# Patient Record
Sex: MALE | Race: WHITE | NOT HISPANIC OR LATINO | Employment: OTHER | URBAN - METROPOLITAN AREA
[De-identification: names, ages, dates, MRNs, and addresses within clinical notes are randomized per-mention and may not be internally consistent; named-entity substitution may affect disease eponyms.]

---

## 2017-09-11 ENCOUNTER — HOSPITAL ENCOUNTER (EMERGENCY)
Facility: HOSPITAL | Age: 62
Discharge: HOME | End: 2017-09-11
Attending: EMERGENCY MEDICINE | Admitting: EMERGENCY MEDICINE

## 2017-09-11 ENCOUNTER — APPOINTMENT (OUTPATIENT)
Dept: GENERAL RADIOLOGY | Facility: HOSPITAL | Age: 62
End: 2017-09-11

## 2017-09-11 VITALS
SYSTOLIC BLOOD PRESSURE: 128 MMHG | WEIGHT: 204 LBS | HEIGHT: 71 IN | HEART RATE: 54 BPM | OXYGEN SATURATION: 97 % | DIASTOLIC BLOOD PRESSURE: 74 MMHG | RESPIRATION RATE: 16 BRPM | BODY MASS INDEX: 28.56 KG/M2 | TEMPERATURE: 97.8 F

## 2017-09-11 DIAGNOSIS — R07.9 CHEST PAIN, UNSPECIFIED TYPE: Primary | ICD-10-CM

## 2017-09-11 DIAGNOSIS — I10 ESSENTIAL HYPERTENSION: ICD-10-CM

## 2017-09-11 DIAGNOSIS — Z87.898 HISTORY OF CHRONIC PAIN: ICD-10-CM

## 2017-09-11 LAB
ALBUMIN SERPL-MCNC: 4.2 G/DL (ref 3.2–4.8)
ALBUMIN/GLOB SERPL: 1.6 G/DL (ref 1.5–2.5)
ALP SERPL-CCNC: 70 U/L (ref 25–100)
ALT SERPL W P-5'-P-CCNC: 26 U/L (ref 7–40)
ANION GAP SERPL CALCULATED.3IONS-SCNC: 6 MMOL/L (ref 3–11)
AST SERPL-CCNC: 23 U/L (ref 0–33)
BASOPHILS # BLD AUTO: 0.04 10*3/MM3 (ref 0–0.2)
BASOPHILS NFR BLD AUTO: 0.4 % (ref 0–1)
BILIRUB SERPL-MCNC: 0.4 MG/DL (ref 0.3–1.2)
BNP SERPL-MCNC: 20 PG/ML (ref 0–100)
BUN BLD-MCNC: 13 MG/DL (ref 9–23)
BUN/CREAT SERPL: 14.4 (ref 7–25)
CALCIUM SPEC-SCNC: 9.2 MG/DL (ref 8.7–10.4)
CHLORIDE SERPL-SCNC: 109 MMOL/L (ref 99–109)
CO2 SERPL-SCNC: 28 MMOL/L (ref 20–31)
CREAT BLD-MCNC: 0.9 MG/DL (ref 0.6–1.3)
DEPRECATED RDW RBC AUTO: 42.8 FL (ref 37–54)
EOSINOPHIL # BLD AUTO: 0.22 10*3/MM3 (ref 0–0.3)
EOSINOPHIL NFR BLD AUTO: 2.5 % (ref 0–3)
ERYTHROCYTE [DISTWIDTH] IN BLOOD BY AUTOMATED COUNT: 12.7 % (ref 11.3–14.5)
GFR SERPL CREATININE-BSD FRML MDRD: 86 ML/MIN/1.73
GLOBULIN UR ELPH-MCNC: 2.6 GM/DL
GLUCOSE BLD-MCNC: 92 MG/DL (ref 70–100)
HCT VFR BLD AUTO: 45.7 % (ref 38.9–50.9)
HGB BLD-MCNC: 15.7 G/DL (ref 13.1–17.5)
HOLD SPECIMEN: NORMAL
HOLD SPECIMEN: NORMAL
IMM GRANULOCYTES # BLD: 0.05 10*3/MM3 (ref 0–0.03)
IMM GRANULOCYTES NFR BLD: 0.6 % (ref 0–0.6)
LIPASE SERPL-CCNC: 34 U/L (ref 6–51)
LYMPHOCYTES # BLD AUTO: 2.48 10*3/MM3 (ref 0.6–4.8)
LYMPHOCYTES NFR BLD AUTO: 27.9 % (ref 24–44)
MCH RBC QN AUTO: 31.9 PG (ref 27–31)
MCHC RBC AUTO-ENTMCNC: 34.4 G/DL (ref 32–36)
MCV RBC AUTO: 92.9 FL (ref 80–99)
MONOCYTES # BLD AUTO: 0.73 10*3/MM3 (ref 0–1)
MONOCYTES NFR BLD AUTO: 8.2 % (ref 0–12)
NEUTROPHILS # BLD AUTO: 5.37 10*3/MM3 (ref 1.5–8.3)
NEUTROPHILS NFR BLD AUTO: 60.4 % (ref 41–71)
PLATELET # BLD AUTO: 183 10*3/MM3 (ref 150–450)
PMV BLD AUTO: 10.4 FL (ref 6–12)
POTASSIUM BLD-SCNC: 3.8 MMOL/L (ref 3.5–5.5)
PROT SERPL-MCNC: 6.8 G/DL (ref 5.7–8.2)
RBC # BLD AUTO: 4.92 10*6/MM3 (ref 4.2–5.76)
SODIUM BLD-SCNC: 143 MMOL/L (ref 132–146)
TROPONIN I SERPL-MCNC: 0 NG/ML (ref 0–0.07)
TROPONIN I SERPL-MCNC: 0 NG/ML (ref 0–0.07)
WBC NRBC COR # BLD: 8.89 10*3/MM3 (ref 3.5–10.8)
WHOLE BLOOD HOLD SPECIMEN: NORMAL
WHOLE BLOOD HOLD SPECIMEN: NORMAL

## 2017-09-11 PROCEDURE — 80053 COMPREHEN METABOLIC PANEL: CPT | Performed by: EMERGENCY MEDICINE

## 2017-09-11 PROCEDURE — 93005 ELECTROCARDIOGRAM TRACING: CPT

## 2017-09-11 PROCEDURE — 71010 HC CHEST PA OR AP: CPT

## 2017-09-11 PROCEDURE — 83690 ASSAY OF LIPASE: CPT | Performed by: EMERGENCY MEDICINE

## 2017-09-11 PROCEDURE — 84484 ASSAY OF TROPONIN QUANT: CPT

## 2017-09-11 PROCEDURE — 85025 COMPLETE CBC W/AUTO DIFF WBC: CPT | Performed by: EMERGENCY MEDICINE

## 2017-09-11 PROCEDURE — 99284 EMERGENCY DEPT VISIT MOD MDM: CPT

## 2017-09-11 PROCEDURE — 83880 ASSAY OF NATRIURETIC PEPTIDE: CPT | Performed by: EMERGENCY MEDICINE

## 2017-09-11 PROCEDURE — 93005 ELECTROCARDIOGRAM TRACING: CPT | Performed by: PHYSICIAN ASSISTANT

## 2017-09-11 RX ORDER — LISINOPRIL AND HYDROCHLOROTHIAZIDE 20; 12.5 MG/1; MG/1
1 TABLET ORAL DAILY
COMMUNITY
End: 2017-09-14

## 2017-09-11 RX ORDER — SODIUM CHLORIDE 0.9 % (FLUSH) 0.9 %
10 SYRINGE (ML) INJECTION AS NEEDED
Status: DISCONTINUED | OUTPATIENT
Start: 2017-09-11 | End: 2017-09-12 | Stop reason: HOSPADM

## 2017-09-11 RX ORDER — ASPIRIN 81 MG/1
324 TABLET, CHEWABLE ORAL ONCE
Status: COMPLETED | OUTPATIENT
Start: 2017-09-11 | End: 2017-09-11

## 2017-09-11 RX ORDER — ASPIRIN 81 MG/1
81 TABLET ORAL DAILY
Qty: 30 TABLET | Refills: 0 | Status: SHIPPED | OUTPATIENT
Start: 2017-09-11

## 2017-09-11 RX ADMIN — ASPIRIN 81 MG 324 MG: 81 TABLET ORAL at 20:20

## 2017-09-12 NOTE — DISCHARGE INSTRUCTIONS
You will be contacted next business day by outpatient registration to schedule your visit with the chest pain clinic.  Follow up with Francine Turner for recheck in 1-2 days.  Stop smoking.  Begin taking aspirin 81 mg daily if you are not already taking aspirin.  No strenuous activity.  Return to emergency department immediately if any change or worsening of symptoms.

## 2017-09-12 NOTE — ED PROVIDER NOTES
"Subjective   HPI Comments: 62-year-old male presents to emergency department with a 3 to four-day history of intermittent substernal chest pain/left sided chest pain that radiates in the left shoulder and into the jaw.  Patient states pain is episodic lasting from a few minutes to an hour.  Patient first noticed the symptoms.  4 days ago while he was standing in a court room.  Symptoms are not associated with exertion, do not improve with rest.  Not associated with nausea diaphoresis palpitations or irregular heartbeat.  He has had a cough for the past 2 weeks with slight scant sputum production.  No hemoptysis or back pain.  No palpitations.  No abdominal pain or LE swelling.  He has a history of hypertension but no prior history of MI or angina.  He states he takes \"for 5\" medications but does not recall the names.  He has a history of chronic arthritis for which he takes hydrocodone.  No prior history of cardiovascular disease other than hypertension, no history of stress test or cardiac catheterization.    He smokes one to one and a half packs of cigarettes per day.    Primary care provider is Francine Spence    He denies drug allergies.    Patient is a 62 y.o. male presenting with chest pain.   Chest Pain   Associated symptoms: no abdominal pain, no cough, no diaphoresis, no fever, no nausea, no shortness of breath and no vomiting        Review of Systems   Constitutional: Negative for chills, diaphoresis and fever.   HENT: Negative for congestion and sore throat.    Respiratory: Negative for cough, choking, chest tightness, shortness of breath and wheezing.    Cardiovascular: Negative for chest pain and leg swelling.   Gastrointestinal: Negative for abdominal distention, abdominal pain, anal bleeding, blood in stool, constipation, diarrhea, nausea and vomiting.   Genitourinary: Negative for difficulty urinating, dysuria, flank pain, frequency, hematuria and urgency.   All other systems reviewed and are " negative.      Past Medical History:   Diagnosis Date   • Hypertension        No Known Allergies    History reviewed. No pertinent surgical history.    History reviewed. No pertinent family history.    Social History     Social History   • Marital status:      Spouse name: N/A   • Number of children: N/A   • Years of education: N/A     Social History Main Topics   • Smoking status: Current Every Day Smoker     Packs/day: 1.50     Types: Cigarettes   • Smokeless tobacco: None   • Alcohol use Yes   • Drug use: No   • Sexual activity: Not Asked     Other Topics Concern   • None     Social History Narrative   • None           Objective   Physical Exam   Constitutional: He is oriented to person, place, and time. He appears well-developed and well-nourished. No distress.   HENT:   Head: Normocephalic and atraumatic.   Right Ear: External ear normal.   Left Ear: External ear normal.   Nose: Nose normal.   Mouth/Throat: Oropharynx is clear and moist. No oropharyngeal exudate.   Eyes: Conjunctivae and EOM are normal. Pupils are equal, round, and reactive to light. Right eye exhibits no discharge. Left eye exhibits no discharge. No scleral icterus.   Neck: Normal range of motion. Neck supple. No JVD present. No tracheal deviation present. No thyromegaly present.   Cardiovascular: Normal rate, regular rhythm, normal heart sounds and intact distal pulses.  Exam reveals no gallop and no friction rub.    No murmur heard.  Pulmonary/Chest: Effort normal and breath sounds normal. No stridor. No respiratory distress. Wheezes: Faint scattered bibasilar wheezes. He has no rales. He exhibits no tenderness.   Thoracic expansion is normal no tachypnea respiratory distress or accessory muscle use.   Abdominal: Soft. Bowel sounds are normal. He exhibits no distension and no mass. There is no tenderness. There is no guarding.   Musculoskeletal: Normal range of motion. He exhibits no edema, tenderness or deformity.   Neurological: He  is alert and oriented to person, place, and time. No cranial nerve deficit. He exhibits normal muscle tone. Coordination normal.   Skin: Skin is warm and dry. No rash noted. He is not diaphoretic. No erythema. No pallor.   Psychiatric: He has a normal mood and affect. His behavior is normal. Judgment and thought content normal.   Nursing note and vitals reviewed.      Procedures         ED Course  ED Course   Comment By Time   D/W Dr. Mike - will place on asa 81 mg qd, refer to chest pain clinic. Pt agreeable to plan. Rigoberto Moore PA-C 09/11 2342            HEART Score  History: Moderately suspicious (+1)  ECG: Normal (+0)  Age: 45 through 65 (+1)  Risk Factors: 3 or more risk factors OR history of atherosclerotic disease (+2)  Troponin: Normal limit or lower (+0)  Total: 4         MDM    Final diagnoses:   Chest pain, unspecified type   Essential hypertension   History of chronic pain            Rigoberto Moore PA-C  09/11/17 [id number]

## 2017-09-14 ENCOUNTER — HOSPITAL ENCOUNTER (OUTPATIENT)
Dept: CARDIOLOGY | Facility: HOSPITAL | Age: 62
Discharge: HOME | End: 2017-09-14
Admitting: NURSE PRACTITIONER

## 2017-09-14 ENCOUNTER — OFFICE VISIT (OUTPATIENT)
Dept: CARDIOLOGY | Facility: HOSPITAL | Age: 62
End: 2017-09-14

## 2017-09-14 VITALS
HEIGHT: 71 IN | BODY MASS INDEX: 28.42 KG/M2 | OXYGEN SATURATION: 96 % | RESPIRATION RATE: 18 BRPM | DIASTOLIC BLOOD PRESSURE: 90 MMHG | HEART RATE: 75 BPM | SYSTOLIC BLOOD PRESSURE: 131 MMHG | WEIGHT: 203 LBS | TEMPERATURE: 98.4 F

## 2017-09-14 DIAGNOSIS — Z72.0 TOBACCO USE: ICD-10-CM

## 2017-09-14 DIAGNOSIS — I10 ESSENTIAL HYPERTENSION: ICD-10-CM

## 2017-09-14 DIAGNOSIS — R07.9 CHEST PAIN, UNSPECIFIED: Primary | ICD-10-CM

## 2017-09-14 DIAGNOSIS — R07.9 CHEST PAIN, UNSPECIFIED: ICD-10-CM

## 2017-09-14 DIAGNOSIS — E78.5 HYPERLIPIDEMIA, UNSPECIFIED HYPERLIPIDEMIA TYPE: ICD-10-CM

## 2017-09-14 PROBLEM — R07.89 CHEST PAIN, RADIATING: Status: ACTIVE | Noted: 2017-09-14

## 2017-09-14 LAB
BH CV STRESS BP STAGE 1: NORMAL
BH CV STRESS BP STAGE 2: NORMAL
BH CV STRESS DURATION MIN STAGE 1: 3
BH CV STRESS DURATION MIN STAGE 2: 1
BH CV STRESS DURATION SEC STAGE 1: 0
BH CV STRESS DURATION SEC STAGE 2: 5
BH CV STRESS GRADE STAGE 1: 10
BH CV STRESS GRADE STAGE 2: 12
BH CV STRESS HR STAGE 1: 112
BH CV STRESS HR STAGE 2: 123
BH CV STRESS METS STAGE 1: 5
BH CV STRESS METS STAGE 2: 7.5
BH CV STRESS PROTOCOL 1: NORMAL
BH CV STRESS RECOVERY BP: NORMAL MMHG
BH CV STRESS RECOVERY HR: 82 BPM
BH CV STRESS SPEED STAGE 1: 1.7
BH CV STRESS SPEED STAGE 2: 2.5
BH CV STRESS STAGE 1: 1
BH CV STRESS STAGE 2: 2
MAXIMAL PREDICTED HEART RATE: 158 BPM
PERCENT MAX PREDICTED HR: 77.85 %
STRESS BASELINE BP: NORMAL MMHG
STRESS BASELINE HR: 64 BPM
STRESS PERCENT HR: 92 %
STRESS POST ESTIMATED WORKLOAD: 5.8 METS
STRESS POST EXERCISE DUR MIN: 4 MIN
STRESS POST EXERCISE DUR SEC: 5 SEC
STRESS POST PEAK BP: NORMAL MMHG
STRESS POST PEAK HR: 123 BPM
STRESS TARGET HR: 134 BPM

## 2017-09-14 PROCEDURE — 93017 CV STRESS TEST TRACING ONLY: CPT

## 2017-09-14 PROCEDURE — 99406 BEHAV CHNG SMOKING 3-10 MIN: CPT | Performed by: NURSE PRACTITIONER

## 2017-09-14 PROCEDURE — 93018 CV STRESS TEST I&R ONLY: CPT | Performed by: INTERNAL MEDICINE

## 2017-09-14 PROCEDURE — 99204 OFFICE O/P NEW MOD 45 MIN: CPT | Performed by: NURSE PRACTITIONER

## 2017-09-14 RX ORDER — IPRATROPIUM BROMIDE 17 UG/1
AEROSOL, METERED RESPIRATORY (INHALATION)
Refills: 0 | COMMUNITY
Start: 2017-08-28 | End: 2022-11-01

## 2017-09-14 RX ORDER — LISINOPRIL AND HYDROCHLOROTHIAZIDE 25; 20 MG/1; MG/1
1 TABLET ORAL DAILY
Refills: 0 | COMMUNITY
Start: 2017-08-29

## 2017-09-14 RX ORDER — GABAPENTIN 600 MG/1
1 TABLET ORAL DAILY PRN
Refills: 0 | COMMUNITY
Start: 2017-08-22 | End: 2019-02-27

## 2017-09-14 RX ORDER — HYDROCODONE BITARTRATE AND ACETAMINOPHEN 10; 325 MG/1; MG/1
1 TABLET ORAL 3 TIMES DAILY PRN
Refills: 0 | COMMUNITY
Start: 2017-08-30

## 2017-09-14 RX ORDER — ATORVASTATIN CALCIUM 80 MG/1
1 TABLET, FILM COATED ORAL DAILY
Refills: 0 | COMMUNITY
Start: 2017-09-09 | End: 2023-03-07 | Stop reason: SDUPTHER

## 2017-09-14 RX ORDER — FOLIC ACID 1 MG/1
1 TABLET ORAL DAILY
Refills: 0 | COMMUNITY
Start: 2017-08-28 | End: 2022-11-01

## 2017-09-14 RX ORDER — ALBUTEROL SULFATE 90 UG/1
2 AEROSOL, METERED RESPIRATORY (INHALATION) EVERY 6 HOURS PRN
Refills: 0 | COMMUNITY
Start: 2017-08-30 | End: 2019-02-27

## 2017-09-14 RX ORDER — MONTELUKAST SODIUM 10 MG/1
1 TABLET ORAL DAILY
Refills: 0 | COMMUNITY
Start: 2017-08-29

## 2017-09-14 RX ORDER — MELOXICAM 15 MG/1
1 TABLET ORAL DAILY
Refills: 0 | COMMUNITY
Start: 2017-08-28 | End: 2019-02-27

## 2017-09-14 RX ORDER — DIAZEPAM 2 MG/1
1 TABLET ORAL 3 TIMES DAILY PRN
Refills: 0 | COMMUNITY
Start: 2017-07-20 | End: 2019-02-27

## 2017-09-14 NOTE — PROGRESS NOTES
[facility]  [facility]  Chest Pain Clinic    Encounter Date:09/14/2017     Juancarlos Espinal  [address] [facility] [address]      1955    MIKI Alvarez    Juancarlos Espinal is a 62 y.o. male.      Subjective:     Chief Complaint:  Chest Pain       HPI     62-year-old male presented  to the [facility] emergency department on 09/11/17 with a 3 to four-day history of intermittent substernal chest pain/left sided chest pain that radiates in the left shoulder and into the jaw.  Patient states pain is episodic lasting from a few minutes to an hour.  Patient first noticed the symptoms 4 days prior to ED visit while he was standing in a court room (under stress with family Psychosocial issues and recent death of is significant other.  Symptoms are not associated with exertion, do not improve with rest.  Not associated with nausea, diaphoresis, palpitations, or irregular heartbeat.  He has had a cough for the past 2 weeks with slight scant sputum production.  No hemoptysis or back pain.  No palpitations.  No abdominal pain or LE swelling.  He has a history of hypertension but no prior history of MI or angina.    He has a history of chronic arthritis for which he takes hydrocodone.  No prior history of cardiovascular disease other than hypertension, no history of stress test or cardiac catheterization. Normal EF per echo 2016.     He smokes one to one and a half packs of cigarettes per day.     Primary care provider is Francine Spence        Cardiac risk factors:  History of  hypertension, HLP  Tobacco use,  gender, age (>50)      No Known Allergies      Current Outpatient Prescriptions:   •  aspirin 81 MG EC tablet, Take 1 tablet by mouth Daily., Disp: 30 tablet, Rfl: 0  •  atorvastatin (LIPITOR) 80 MG tablet, Take 1 tablet by mouth Daily., Disp: , Rfl: 0  •  ATROVENT HFA 17 MCG/ACT inhaler, inhale 1 puff by mouth twice a day, Disp: , Rfl: 0  •  diazePAM (VALIUM) 2 MG tablet, Take 1  tablet by mouth 3 (Three) Times a Day As Needed for Anxiety., Disp: , Rfl: 0  •  folic acid (FOLVITE) 1 MG tablet, Take 1 tablet by mouth Daily., Disp: , Rfl: 0  •  gabapentin (NEURONTIN) 600 MG tablet, Take 1 tablet by mouth Daily As Needed., Disp: , Rfl: 0  •  HYDROcodone-acetaminophen (NORCO)  MG per tablet, Take 1 tablet by mouth 3 (Three) Times a Day As Needed for Pain., Disp: , Rfl: 0  •  lisinopril-hydrochlorothiazide (PRINZIDE,ZESTORETIC) 20-25 MG per tablet, Take 1 tablet by mouth Daily., Disp: , Rfl: 0  •  meloxicam (MOBIC) 15 MG tablet, Take 1 tablet by mouth Daily., Disp: , Rfl: 0  •  montelukast (SINGULAIR) 10 MG tablet, Take 1 tablet by mouth Daily., Disp: , Rfl: 0  •  VENTOLIN  (90 Base) MCG/ACT inhaler, Inhale 2 puffs Every 6 (Six) Hours As Needed for Wheezing., Disp: , Rfl: 0    The following portions of the patient's history were reviewed and updated as appropriate in Epic:  Problem list, allergies, current medications, past medical and surgical history, past social and family history.     Review of Systems   Constitution: Negative for chills, decreased appetite, diaphoresis, fever, weakness, malaise/fatigue, night sweats, weight gain and weight loss.   HENT: Negative for congestion, headaches and nosebleeds.    Eyes: Negative for blurred vision, visual disturbance and visual halos.   Cardiovascular: Positive for chest pain. Negative for claudication, cyanosis, dyspnea on exertion, irregular heartbeat, leg swelling, near-syncope, orthopnea, palpitations, paroxysmal nocturnal dyspnea and syncope.   Respiratory: Positive for cough. Negative for hemoptysis, shortness of breath, sleep disturbances due to breathing, snoring, sputum production and wheezing.    Endocrine: Negative for cold intolerance, heat intolerance, polydipsia, polyphagia and polyuria.   Hematologic/Lymphatic: Bruises/bleeds easily.   Skin: Negative for dry skin, itching and rash.   Musculoskeletal: Positive for joint  "pain. Negative for falls, joint swelling, muscle weakness and myalgias.   Gastrointestinal: Negative for bloating, abdominal pain, constipation, diarrhea, dysphagia, heartburn, melena, nausea and vomiting.   Genitourinary: Negative for dysuria, flank pain, hematuria and nocturia.   Neurological: Negative for difficulty with concentration, excessive daytime sleepiness, dizziness and loss of balance.   Psychiatric/Behavioral: Negative for altered mental status and depression. The patient has insomnia. The patient is not nervous/anxious.    Allergic/Immunologic: Negative for environmental allergies.       Objective:     Vitals:    09/14/17 0939 09/14/17 0943 09/14/17 0944   BP: 125/87 135/88 131/90   BP Location: Right arm Left arm Left arm   Patient Position: Sitting Sitting Standing   Pulse: 71 71 75   Resp: 18     Temp: 98.4 °F (36.9 °C)     TempSrc: Temporal Artery      SpO2: 96%     Weight: 203 lb (92.1 kg)     Height: 71\" (180.3 cm)           Physical Exam  Physical Exam   Constitutional: He is oriented to person, place, and time. He appears well-developed and well-nourished. No distress.   HENT:   Head: Normocephalic and atraumatic.   Right Ear: External ear normal.   Left Ear: External ear normal.   Nose: Nose normal.   Mouth/Throat: Oropharynx is clear and moist. No oropharyngeal exudate.   Eyes: Conjunctivae and EOM are normal. Pupils are equal, round, and reactive to light. Right eye exhibits no discharge. Left eye exhibits no discharge. No scleral icterus.   Neck: Normal range of motion. Neck supple. No JVD present. No tracheal deviation present. No thyromegaly present.   Cardiovascular: Normal rate, regular rhythm, normal heart sounds and intact distal pulses.  Exam reveals no gallop and no friction rub.    No murmur heard.  Pulmonary/Chest: Effort normal and breath sounds normal. No stridor. No respiratory distress. Wheezes: Faint scattered bibasilar wheezes. He has no rales. He exhibits no tenderness. "   Thoracic expansion is normal no tachypnea respiratory distress or accessory muscle use.   Abdominal: Soft. Bowel sounds are normal. He exhibits no distension and no mass. There is no tenderness. There is no guarding.   Musculoskeletal: Normal range of motion. He exhibits no edema, tenderness or deformity.   Neurological: He is alert and oriented to person, place, and time. No cranial nerve deficit. He exhibits normal muscle tone. Coordination normal.   Skin: Skin is warm and dry. No rash noted. He is not diaphoretic. No erythema. No pallor.   Psychiatric: He has a normal mood and affect. His behavior is normal. Judgment and thought content normal      Lab and Diagnostic Review:  Admission on 09/11/2017, Discharged on 09/11/2017   Component Date Value Ref Range Status   • Glucose 09/11/2017 92  70 - 100 mg/dL Final   • BUN 09/11/2017 13  9 - 23 mg/dL Final   • Creatinine 09/11/2017 0.90  0.60 - 1.30 mg/dL Final   • Sodium 09/11/2017 143  132 - 146 mmol/L Final   • Potassium 09/11/2017 3.8  3.5 - 5.5 mmol/L Final   • Chloride 09/11/2017 109  99 - 109 mmol/L Final   • CO2 09/11/2017 28.0  20.0 - 31.0 mmol/L Final   • Calcium 09/11/2017 9.2  8.7 - 10.4 mg/dL Final   • Total Protein 09/11/2017 6.8  5.7 - 8.2 g/dL Final   • Albumin 09/11/2017 4.20  3.20 - 4.80 g/dL Final   • ALT (SGPT) 09/11/2017 26  7 - 40 U/L Final   • AST (SGOT) 09/11/2017 23  0 - 33 U/L Final   • Alkaline Phosphatase 09/11/2017 70  25 - 100 U/L Final   • Total Bilirubin 09/11/2017 0.4  0.3 - 1.2 mg/dL Final   • eGFR Non African Amer 09/11/2017 86  >60 mL/min/1.73 Final   • Globulin 09/11/2017 2.6  gm/dL Final   • A/G Ratio 09/11/2017 1.6  1.5 - 2.5 g/dL Final   • BUN/Creatinine Ratio 09/11/2017 14.4  7.0 - 25.0 Final   • Anion Gap 09/11/2017 6.0  3.0 - 11.0 mmol/L Final   • Lipase 09/11/2017 34  6 - 51 U/L Final   • BNP 09/11/2017 20.0  0.0 - 100.0 pg/mL Final   • Extra Tube 09/11/2017 hold for add-on   Final    Auto resulted   • Extra Tube  09/11/2017 Hold for add-ons.   Final    Auto resulted.   • Extra Tube 09/11/2017 hold for add-on   Final    Auto resulted   • Extra Tube 09/11/2017 Hold for add-ons.   Final    Auto resulted.   • WBC 09/11/2017 8.89  3.50 - 10.80 10*3/mm3 Final   • RBC 09/11/2017 4.92  4.20 - 5.76 10*6/mm3 Final   • Hemoglobin 09/11/2017 15.7  13.1 - 17.5 g/dL Final   • Hematocrit 09/11/2017 45.7  38.9 - 50.9 % Final   • MCV 09/11/2017 92.9  80.0 - 99.0 fL Final   • MCH 09/11/2017 31.9* 27.0 - 31.0 pg Final   • MCHC 09/11/2017 34.4  32.0 - 36.0 g/dL Final   • RDW 09/11/2017 12.7  11.3 - 14.5 % Final   • RDW-SD 09/11/2017 42.8  37.0 - 54.0 fl Final   • MPV 09/11/2017 10.4  6.0 - 12.0 fL Final   • Platelets 09/11/2017 183  150 - 450 10*3/mm3 Final   • Neutrophil % 09/11/2017 60.4  41.0 - 71.0 % Final   • Lymphocyte % 09/11/2017 27.9  24.0 - 44.0 % Final   • Monocyte % 09/11/2017 8.2  0.0 - 12.0 % Final   • Eosinophil % 09/11/2017 2.5  0.0 - 3.0 % Final   • Basophil % 09/11/2017 0.4  0.0 - 1.0 % Final   • Immature Grans % 09/11/2017 0.6  0.0 - 0.6 % Final   • Neutrophils, Absolute 09/11/2017 5.37  1.50 - 8.30 10*3/mm3 Final   • Lymphocytes, Absolute 09/11/2017 2.48  0.60 - 4.80 10*3/mm3 Final   • Monocytes, Absolute 09/11/2017 0.73  0.00 - 1.00 10*3/mm3 Final   • Eosinophils, Absolute 09/11/2017 0.22  0.00 - 0.30 10*3/mm3 Final   • Basophils, Absolute 09/11/2017 0.04  0.00 - 0.20 10*3/mm3 Final   • Immature Grans, Absolute 09/11/2017 0.05* 0.00 - 0.03 10*3/mm3 Final   • Troponin I 09/11/2017 0.00  0.00 - 0.07 ng/mL Final    Serial Number: [id number]  [id number]   • Troponin I 09/11/2017 0.00  0.00 - 0.07 ng/mL Final    Serial Number: [id number]  [id number]     EKG 9/11/17, normal sinus rhythm 82 bpm  Echocardiogram 12/7/16: EF 57% no significant valvular heart disease  No acute cardiopulmonary process chest x-ray 09/11/17  Assessment and Plan:     1. Chest pain, unspecified  Would prefer Cardiolite GXT, but patient declined due  to time of test, and scheduling.  Patient has a history of chronic pain and arthritis, but feels he would be able to ambulate on treadmill.  Patient understands that if he is unable to reach target heart rate or test inconclusive may need nuclear images.    - Treadmill Stress Test; Future    2. Essential hypertension  Lisinopril, HCTZ combo    3. Tobacco use  Discussed the importance of smoking cessation, risk factors, cessation aids (cessation education time >3 minutes)    4. Hyperlipidemia, unspecified hyperlipidemia type  Statin    F/u to be scheduled pending test results.          *Please note that portions of this note were completed with a voice recognition program. Efforts were made to edit the dictations, but occasionally words are mistranscribed.

## 2017-09-15 ENCOUNTER — DOCUMENTATION (OUTPATIENT)
Dept: CARDIOLOGY | Facility: HOSPITAL | Age: 62
End: 2017-09-15

## 2017-09-15 NOTE — PROGRESS NOTES
Called to discuss treadmill stress test results with pt.    · The study was non diagnostic. The patient was unable to reach his target heart rate due to leg weakness, exercising for 4:05 minutes and reaching only 77% of his maximally predicted heart rate. During that time, though, there was no clinical or ECG evidence of myocardial ischemia. There were PACs and PVCs with stress. This test is of indeterminate risk.    Discussed doing Lexiscan.  Pt declined.  Discussed the possibility of completed ABIs to evaluate claudication.  Pt declined.     Pt will be scheduled f/u 6 weeks.  Encouraged to f/u with PCP.  If s/s continue to call. If worsened go to ED with chest pain.

## 2017-10-26 ENCOUNTER — OFFICE VISIT (OUTPATIENT)
Dept: CARDIOLOGY | Facility: HOSPITAL | Age: 62
End: 2017-10-26

## 2017-10-26 VITALS
RESPIRATION RATE: 18 BRPM | WEIGHT: 201.4 LBS | BODY MASS INDEX: 28.19 KG/M2 | HEART RATE: 83 BPM | TEMPERATURE: 98.1 F | SYSTOLIC BLOOD PRESSURE: 126 MMHG | HEIGHT: 71 IN | OXYGEN SATURATION: 96 % | DIASTOLIC BLOOD PRESSURE: 86 MMHG

## 2017-10-26 DIAGNOSIS — I10 ESSENTIAL HYPERTENSION: ICD-10-CM

## 2017-10-26 DIAGNOSIS — E78.5 HYPERLIPIDEMIA, UNSPECIFIED HYPERLIPIDEMIA TYPE: ICD-10-CM

## 2017-10-26 DIAGNOSIS — Z72.0 TOBACCO USE: ICD-10-CM

## 2017-10-26 DIAGNOSIS — R07.9 CHEST PAIN, UNSPECIFIED TYPE: Primary | ICD-10-CM

## 2017-10-26 PROCEDURE — 99213 OFFICE O/P EST LOW 20 MIN: CPT | Performed by: NURSE PRACTITIONER

## 2017-10-26 NOTE — PROGRESS NOTES
"[facility]  [facility]      Encounter Date:10/26/2017     Juancarlos Espinal  [address] [facility] [place] [address]      1955    MIKI Alvarez    Juancarlos Espinal is a 62 y.o. male.      Subjective:     Chief Complaint:  Follow-up (chest pain)       HPI   Pt was last seen with c/o of CP.  Treadmills stress completed with no ischemia noted, but unable to reach target HR.  Pt reports today that CP has resolved, not recurrence.      Denies CP, pressure, palpitations, dizziness, dyspnea, edema, syncope.  Pt has \"slowed down\". Less stress in life.  Doing much better.  Continued to smoke    Patient Active Problem List   Diagnosis   • Essential hypertension   • Tobacco use   • Hyperlipidemia         Past Surgical History:   Procedure Laterality Date   • STEROID INJECTION HIP Right        No Known Allergies      Current Outpatient Prescriptions:   •  aspirin 81 MG EC tablet, Take 1 tablet by mouth Daily., Disp: 30 tablet, Rfl: 0  •  atorvastatin (LIPITOR) 80 MG tablet, Take 1 tablet by mouth Daily., Disp: , Rfl: 0  •  ATROVENT HFA 17 MCG/ACT inhaler, inhale 1 puff by mouth twice a day, Disp: , Rfl: 0  •  diazePAM (VALIUM) 2 MG tablet, Take 1 tablet by mouth 3 (Three) Times a Day As Needed for Anxiety., Disp: , Rfl: 0  •  folic acid (FOLVITE) 1 MG tablet, Take 1 tablet by mouth Daily., Disp: , Rfl: 0  •  gabapentin (NEURONTIN) 600 MG tablet, Take 1 tablet by mouth Daily As Needed., Disp: , Rfl: 0  •  HYDROcodone-acetaminophen (NORCO)  MG per tablet, Take 1 tablet by mouth 3 (Three) Times a Day As Needed for Pain., Disp: , Rfl: 0  •  lisinopril-hydrochlorothiazide (PRINZIDE,ZESTORETIC) 20-25 MG per tablet, Take 1 tablet by mouth Daily., Disp: , Rfl: 0  •  meloxicam (MOBIC) 15 MG tablet, Take 1 tablet by mouth Daily., Disp: , Rfl: 0  •  montelukast (SINGULAIR) 10 MG tablet, Take 1 tablet by mouth Daily., Disp: , Rfl: 0  •  VENTOLIN  (90 Base) MCG/ACT inhaler, Inhale 2 puffs " Every 6 (Six) Hours As Needed for Wheezing., Disp: , Rfl: 0    The following portions of the patient's history were reviewed and updated as appropriate: allergies, current medications, past family history, past medical history, past social history, past surgical history and problem list.    Review of Systems   Constitution: Negative for chills, decreased appetite, diaphoresis, fever, weakness, malaise/fatigue, night sweats, weight gain and weight loss.   HENT: Negative for congestion and nosebleeds.    Eyes: Negative for blurred vision, visual disturbance and visual halos.   Cardiovascular: Negative for chest pain, claudication, cyanosis, dyspnea on exertion, irregular heartbeat, leg swelling, near-syncope, orthopnea, palpitations, paroxysmal nocturnal dyspnea and syncope.   Respiratory: Positive for cough. Negative for hemoptysis, shortness of breath, sleep disturbances due to breathing, snoring, sputum production and wheezing.    Endocrine: Negative for cold intolerance, heat intolerance, polydipsia, polyphagia and polyuria.   Hematologic/Lymphatic: Does not bruise/bleed easily.   Skin: Negative for dry skin, itching and rash.   Musculoskeletal: Positive for joint pain and muscle cramps. Negative for falls, joint swelling, muscle weakness and myalgias.   Gastrointestinal: Negative for bloating, abdominal pain, constipation, diarrhea, dysphagia, heartburn, melena, nausea and vomiting.   Genitourinary: Negative for dysuria, flank pain, hematuria and nocturia.   Neurological: Negative for difficulty with concentration, excessive daytime sleepiness, dizziness, headaches and loss of balance.   Psychiatric/Behavioral: Negative for altered mental status and depression. The patient is not nervous/anxious.    Allergic/Immunologic: Negative for environmental allergies.       Objective:     Vitals:    10/26/17 0945 10/26/17 0948 10/26/17 0949   BP: 119/72 117/79 126/86   BP Location: Right arm Left arm Left arm   Patient  "Position: Sitting Sitting Standing   Pulse: 76 80 83   Resp: 18     Temp: 98.1 °F (36.7 °C)     TempSrc: Temporal Artery      SpO2: 96%     Weight: 201 lb 6.4 oz (91.4 kg)     Height: 71\" (180.3 cm)           Physical Exam   Constitutional: He is oriented to person, place, and time. He appears well-developed and well-nourished. No distress.   HENT:   Head: Normocephalic and atraumatic.   Mouth/Throat: Oropharynx is clear and moist.   Eyes: Conjunctivae are normal. Pupils are equal, round, and reactive to light. No scleral icterus.   Neck: No hepatojugular reflux and no JVD present. Carotid bruit is not present. No tracheal deviation present. No thyromegaly present.   Cardiovascular: Normal rate, regular rhythm, normal heart sounds and intact distal pulses.  Exam reveals no friction rub.    No murmur heard.  Pulmonary/Chest: Effort normal and breath sounds normal.   Abdominal: Soft. Bowel sounds are normal. He exhibits no distension. There is no tenderness.   Musculoskeletal: He exhibits no edema.   Lymphadenopathy:     He has no cervical adenopathy.   Neurological: He is alert and oriented to person, place, and time.   Skin: Skin is warm, dry and intact. No rash noted. No cyanosis or erythema. No pallor.   Psychiatric: He has a normal mood and affect. His behavior is normal. Thought content normal.   Vitals reviewed.      Lab and Diagnostic Review:  09/14/17  · The study was non diagnostic. The patient was unable to reach his target heart rate due to leg weakness, exercising for 4:05 minutes and reaching only 77% of his maximally predicted heart rate. During that time, though, there was no clinical or ECG evidence of myocardial ischemia. There were PACs and PVCs with stress. This test is of indeterminate risk.  Assessment and Plan:         1. Chest pain, unspecified type  Resolved, no recurrence     Recommend cardiac prevention, including following a healthy diet that emphasizes vegetables, fruits, whole grains, and " legumes, exercising 30 minutes 5-7 days a week, maintenance of healthy body weight and refraining from tobacco use. Also I advise good blood pressure, cholesterol and blood sugar control.        2. Essential hypertension  stable    3. Hyperlipidemia, unspecified hyperlipidemia type  statin    4. Tobacco use  Discussed smoking cessation    F/u with PCP as scheduled.  [facility] as needed.      *Please note that portions of this note were completed with a voice recognition program. Efforts were made to edit the dictations, but occasionally words are mistranscribed.

## 2019-02-24 ENCOUNTER — APPOINTMENT (OUTPATIENT)
Dept: MRI IMAGING | Facility: HOSPITAL | Age: 64
End: 2019-02-24

## 2019-02-24 ENCOUNTER — APPOINTMENT (OUTPATIENT)
Dept: GENERAL RADIOLOGY | Facility: HOSPITAL | Age: 64
End: 2019-02-24

## 2019-02-24 ENCOUNTER — APPOINTMENT (OUTPATIENT)
Dept: CT IMAGING | Facility: HOSPITAL | Age: 64
End: 2019-02-24

## 2019-02-24 ENCOUNTER — HOSPITAL ENCOUNTER (EMERGENCY)
Facility: HOSPITAL | Age: 64
Discharge: HOME | End: 2019-02-25
Attending: EMERGENCY MEDICINE | Admitting: EMERGENCY MEDICINE

## 2019-02-24 DIAGNOSIS — I10 ELEVATED BLOOD PRESSURE READING WITH DIAGNOSIS OF HYPERTENSION: Primary | ICD-10-CM

## 2019-02-24 DIAGNOSIS — R42 VERTIGO: ICD-10-CM

## 2019-02-24 LAB
ALBUMIN SERPL-MCNC: 4.39 G/DL (ref 3.2–4.8)
ALBUMIN/GLOB SERPL: 2.1 G/DL (ref 1.5–2.5)
ALP SERPL-CCNC: 62 U/L (ref 25–100)
ALT SERPL W P-5'-P-CCNC: 43 U/L (ref 7–40)
ANION GAP SERPL CALCULATED.3IONS-SCNC: 8 MMOL/L (ref 3–11)
AST SERPL-CCNC: 28 U/L (ref 0–33)
BASOPHILS # BLD AUTO: 0.04 10*3/MM3 (ref 0–0.2)
BASOPHILS NFR BLD AUTO: 0.4 % (ref 0–1)
BILIRUB SERPL-MCNC: 0.4 MG/DL (ref 0.3–1.2)
BNP SERPL-MCNC: 13 PG/ML (ref 0–100)
BUN BLD-MCNC: 17 MG/DL (ref 9–23)
BUN/CREAT SERPL: 17.3 (ref 7–25)
CALCIUM SPEC-SCNC: 9.3 MG/DL (ref 8.7–10.4)
CHLORIDE SERPL-SCNC: 107 MMOL/L (ref 99–109)
CO2 SERPL-SCNC: 27 MMOL/L (ref 20–31)
CREAT BLD-MCNC: 0.98 MG/DL (ref 0.6–1.3)
DEPRECATED RDW RBC AUTO: 44.6 FL (ref 37–54)
EOSINOPHIL # BLD AUTO: 0.16 10*3/MM3 (ref 0–0.3)
EOSINOPHIL NFR BLD AUTO: 1.7 % (ref 0–3)
ERYTHROCYTE [DISTWIDTH] IN BLOOD BY AUTOMATED COUNT: 13 % (ref 11.3–14.5)
GFR SERPL CREATININE-BSD FRML MDRD: 77 ML/MIN/1.73
GLOBULIN UR ELPH-MCNC: 2.1 GM/DL
GLUCOSE BLD-MCNC: 102 MG/DL (ref 70–100)
HCT VFR BLD AUTO: 51.1 % (ref 38.9–50.9)
HGB BLD-MCNC: 17.5 G/DL (ref 13.1–17.5)
HOLD SPECIMEN: NORMAL
HOLD SPECIMEN: NORMAL
IMM GRANULOCYTES # BLD AUTO: 0.05 10*3/MM3 (ref 0–0.05)
IMM GRANULOCYTES NFR BLD AUTO: 0.5 % (ref 0–0.6)
LIPASE SERPL-CCNC: 36 U/L (ref 6–51)
LYMPHOCYTES # BLD AUTO: 2.14 10*3/MM3 (ref 0.6–4.8)
LYMPHOCYTES NFR BLD AUTO: 22.5 % (ref 24–44)
MCH RBC QN AUTO: 32.3 PG (ref 27–31)
MCHC RBC AUTO-ENTMCNC: 34.2 G/DL (ref 32–36)
MCV RBC AUTO: 94.3 FL (ref 80–99)
MONOCYTES # BLD AUTO: 0.86 10*3/MM3 (ref 0–1)
MONOCYTES NFR BLD AUTO: 9 % (ref 0–12)
NEUTROPHILS # BLD AUTO: 6.31 10*3/MM3 (ref 1.5–8.3)
NEUTROPHILS NFR BLD AUTO: 66.4 % (ref 41–71)
PLATELET # BLD AUTO: 194 10*3/MM3 (ref 150–450)
PMV BLD AUTO: 10.4 FL (ref 6–12)
POTASSIUM BLD-SCNC: 3.9 MMOL/L (ref 3.5–5.5)
PROT SERPL-MCNC: 6.5 G/DL (ref 5.7–8.2)
RBC # BLD AUTO: 5.42 10*6/MM3 (ref 4.2–5.76)
SODIUM BLD-SCNC: 142 MMOL/L (ref 132–146)
TROPONIN I SERPL-MCNC: 0 NG/ML (ref 0–0.07)
TROPONIN I SERPL-MCNC: 0 NG/ML (ref 0–0.07)
WBC NRBC COR # BLD: 9.51 10*3/MM3 (ref 3.5–10.8)
WHOLE BLOOD HOLD SPECIMEN: NORMAL
WHOLE BLOOD HOLD SPECIMEN: NORMAL

## 2019-02-24 PROCEDURE — 83690 ASSAY OF LIPASE: CPT | Performed by: EMERGENCY MEDICINE

## 2019-02-24 PROCEDURE — 70450 CT HEAD/BRAIN W/O DYE: CPT

## 2019-02-24 PROCEDURE — 84484 ASSAY OF TROPONIN QUANT: CPT

## 2019-02-24 PROCEDURE — 83880 ASSAY OF NATRIURETIC PEPTIDE: CPT | Performed by: EMERGENCY MEDICINE

## 2019-02-24 PROCEDURE — 99284 EMERGENCY DEPT VISIT MOD MDM: CPT

## 2019-02-24 PROCEDURE — 71045 X-RAY EXAM CHEST 1 VIEW: CPT

## 2019-02-24 PROCEDURE — 85025 COMPLETE CBC W/AUTO DIFF WBC: CPT

## 2019-02-24 PROCEDURE — 93005 ELECTROCARDIOGRAM TRACING: CPT | Performed by: EMERGENCY MEDICINE

## 2019-02-24 PROCEDURE — 80053 COMPREHEN METABOLIC PANEL: CPT | Performed by: EMERGENCY MEDICINE

## 2019-02-24 PROCEDURE — 70551 MRI BRAIN STEM W/O DYE: CPT

## 2019-02-24 RX ORDER — SODIUM CHLORIDE 0.9 % (FLUSH) 0.9 %
10 SYRINGE (ML) INJECTION AS NEEDED
Status: DISCONTINUED | OUTPATIENT
Start: 2019-02-24 | End: 2019-02-25 | Stop reason: HOSPADM

## 2019-02-25 VITALS
HEART RATE: 57 BPM | DIASTOLIC BLOOD PRESSURE: 82 MMHG | BODY MASS INDEX: 28.98 KG/M2 | SYSTOLIC BLOOD PRESSURE: 117 MMHG | WEIGHT: 207 LBS | RESPIRATION RATE: 18 BRPM | HEIGHT: 71 IN | OXYGEN SATURATION: 97 % | TEMPERATURE: 98.6 F

## 2019-02-25 RX ORDER — CLONIDINE HYDROCHLORIDE 0.1 MG/1
0.1 TABLET ORAL 3 TIMES DAILY PRN
Qty: 30 TABLET | Refills: 0 | Status: SHIPPED | OUTPATIENT
Start: 2019-02-25

## 2019-02-27 ENCOUNTER — OFFICE VISIT (OUTPATIENT)
Dept: CARDIOLOGY | Facility: HOSPITAL | Age: 64
End: 2019-02-27

## 2019-02-27 VITALS
SYSTOLIC BLOOD PRESSURE: 114 MMHG | BODY MASS INDEX: 28.92 KG/M2 | TEMPERATURE: 98.1 F | DIASTOLIC BLOOD PRESSURE: 78 MMHG | HEART RATE: 77 BPM | OXYGEN SATURATION: 96 % | RESPIRATION RATE: 16 BRPM | WEIGHT: 206.6 LBS | HEIGHT: 71 IN

## 2019-02-27 DIAGNOSIS — R42 LIGHTHEADEDNESS: ICD-10-CM

## 2019-02-27 DIAGNOSIS — R07.89 CHEST PRESSURE: Primary | ICD-10-CM

## 2019-02-27 DIAGNOSIS — Z72.0 TOBACCO USE: ICD-10-CM

## 2019-02-27 DIAGNOSIS — R42 DIZZINESS: ICD-10-CM

## 2019-02-27 PROBLEM — G89.29 CHRONIC PAIN: Status: ACTIVE | Noted: 2019-02-27

## 2019-02-27 PROBLEM — F41.9 ANXIETY: Status: ACTIVE | Noted: 2019-02-27

## 2019-02-27 PROBLEM — J44.9 COPD (CHRONIC OBSTRUCTIVE PULMONARY DISEASE) (HCC): Status: ACTIVE | Noted: 2019-02-27

## 2019-02-27 PROBLEM — M19.90 ARTHRITIS: Status: ACTIVE | Noted: 2019-02-27

## 2019-02-27 PROCEDURE — 99214 OFFICE O/P EST MOD 30 MIN: CPT | Performed by: NURSE PRACTITIONER

## 2019-02-27 RX ORDER — CLONAZEPAM 1 MG/1
1 TABLET ORAL 2 TIMES DAILY PRN
COMMUNITY

## 2019-02-27 RX ORDER — DOCUSATE SODIUM 250 MG
250 CAPSULE ORAL DAILY
COMMUNITY
End: 2022-11-01

## 2019-02-27 RX ORDER — MECLIZINE HYDROCHLORIDE 25 MG/1
1 TABLET ORAL EVERY 6 HOURS PRN
Refills: 0 | COMMUNITY
Start: 2019-02-25 | End: 2022-11-01

## 2019-02-27 NOTE — PROGRESS NOTES
"Encounter Date:02/27/2019      Patient ID: Juancarlos Espinal is a 63 y.o. male.        Subjective:     Chief Complaint: Establish Care   htn, dizziness, chest pressure   History of Present Illness patient presents to the office today for ongoing evaluation of his recent dizziness, lightheadedness and elevated bp readings. He presented to [facility] ED on 2/24/19 with complaints of significant dizziness and lightheadedness. He notes he was unable to walk across the floor. He notes that he \"felt like he was drunk\". BP prior to arrival in ED was significantly elevated at 190/115. He notes occasional chest pressure that does not worsen with exertion. He denies dyspnea, syncope, pedal edema. He had a gxt in 2017 that was non diagnostic due to failure to reach target heart rate. Symptoms resolved at that time so no further testing was ordered.   Patient has long history of chronic pain and notes significant pain in his low back and legs.     Patient Active Problem List   Diagnosis   • Hypertension   • Tobacco use   • Hyperlipidemia   • COPD (chronic obstructive pulmonary disease) (CMS/[facility])   • Chronic pain   • Arthritis   • Anxiety       Past Surgical History:   Procedure Laterality Date   • STEROID INJECTION HIP Right        No Known Allergies      Current Outpatient Medications:   •  clonazePAM (KlonoPIN) 1 MG tablet, Take 1 mg by mouth 2 (Two) Times a Day As Needed for Seizures., Disp: , Rfl:   •  docusate sodium (COLACE) 250 MG capsule, Take 250 mg by mouth Daily., Disp: , Rfl:   •  aspirin 81 MG EC tablet, Take 1 tablet by mouth Daily., Disp: 30 tablet, Rfl: 0  •  atorvastatin (LIPITOR) 80 MG tablet, Take 1 tablet by mouth Daily., Disp: , Rfl: 0  •  ATROVENT HFA 17 MCG/ACT inhaler, inhale 1 puff by mouth twice a day, Disp: , Rfl: 0  •  CloNIDine (CATAPRES) 0.1 MG tablet, Take 1 tablet by mouth 3 (Three) Times a Day As Needed for High Blood Pressure (If your blood pressure is over 180)., Disp: 30 tablet, Rfl: 0  •  folic acid " (FOLVITE) 1 MG tablet, Take 1 tablet by mouth Daily., Disp: , Rfl: 0  •  HYDROcodone-acetaminophen (NORCO)  MG per tablet, Take 1 tablet by mouth 3 (Three) Times a Day As Needed for Pain., Disp: , Rfl: 0  •  lisinopril-hydrochlorothiazide (PRINZIDE,ZESTORETIC) 20-25 MG per tablet, Take 1 tablet by mouth Daily., Disp: , Rfl: 0  •  meclizine (ANTIVERT) 25 MG tablet, Take 1 tablet by mouth Every 6 (Six) Hours As Needed for dizziness., Disp: , Rfl: 0  •  montelukast (SINGULAIR) 10 MG tablet, Take 1 tablet by mouth Daily., Disp: , Rfl: 0    The following portions of the chart were reviewed and updated as appropriate: Allergies, current medications, past family history, social history, past medical history.     Review of Systems   Constitution: Negative for chills, decreased appetite, diaphoresis, fever, weakness, malaise/fatigue, night sweats, weight gain and weight loss.   HENT: Negative for congestion, hearing loss, hoarse voice and nosebleeds.    Eyes: Negative for blurred vision, visual disturbance and visual halos.   Cardiovascular: Positive for chest pain (pressure). Negative for claudication, cyanosis, dyspnea on exertion, irregular heartbeat, leg swelling, near-syncope, orthopnea, palpitations, paroxysmal nocturnal dyspnea and syncope.   Respiratory: Negative for cough, hemoptysis, shortness of breath, sleep disturbances due to breathing, snoring, sputum production and wheezing.    Hematologic/Lymphatic: Negative for bleeding problem. Does not bruise/bleed easily.   Skin: Negative for dry skin, itching and rash.   Musculoskeletal: Negative for arthritis, falls, joint pain, joint swelling and myalgias.   Gastrointestinal: Negative for bloating, abdominal pain, constipation, diarrhea, flatus, heartburn, hematemesis, hematochezia, melena, nausea and vomiting.   Genitourinary: Negative for dysuria, frequency, hematuria, nocturia and urgency.   Neurological: Positive for dizziness and light-headedness. Negative  "for excessive daytime sleepiness, headaches and loss of balance.   Psychiatric/Behavioral: Negative for depression. The patient does not have insomnia and is not nervous/anxious.            Objective:     Vitals:    02/27/19 0948 02/27/19 0952 02/27/19 0953   BP: 118/81 121/81 114/78   BP Location: Right arm Left arm Left arm   Patient Position: Sitting Sitting Standing   Pulse: 69  77   Resp: 16     Temp: 98.1 °F (36.7 °C)     TempSrc: Temporal     SpO2: 96%     Weight: 93.7 kg (206 lb 9.6 oz)     Height: 180.3 cm (71\")           Physical Exam   Constitutional: He is oriented to person, place, and time. He appears well-developed and well-nourished. He is active and cooperative. No distress.   HENT:   Head: Normocephalic and atraumatic.   Mouth/Throat: Oropharynx is clear and moist.   Eyes: Conjunctivae and EOM are normal. Pupils are equal, round, and reactive to light.   Neck: Normal range of motion. Neck supple. No JVD present. No tracheal deviation present. No thyromegaly present.   Cardiovascular: Normal rate, regular rhythm, normal heart sounds and intact distal pulses.   Pulmonary/Chest: Effort normal and breath sounds normal.   Abdominal: Soft. Bowel sounds are normal. He exhibits no distension. There is no tenderness.   Musculoskeletal: Normal range of motion.   Neurological: He is alert and oriented to person, place, and time.   Skin: Skin is warm, dry and intact.   Psychiatric: He has a normal mood and affect. His behavior is normal.   Nursing note and vitals reviewed.      Lab and Diagnostic Review:      Results for orders placed or performed during the hospital encounter of 02/24/19   Comprehensive Metabolic Panel   Result Value Ref Range    Glucose 102 (H) 70 - 100 mg/dL    BUN 17 9 - 23 mg/dL    Creatinine 0.98 0.60 - 1.30 mg/dL    Sodium 142 132 - 146 mmol/L    Potassium 3.9 3.5 - 5.5 mmol/L    Chloride 107 99 - 109 mmol/L    CO2 27.0 20.0 - 31.0 mmol/L    Calcium 9.3 8.7 - 10.4 mg/dL    Total Protein " 6.5 5.7 - 8.2 g/dL    Albumin 4.39 3.20 - 4.80 g/dL    ALT (SGPT) 43 (H) 7 - 40 U/L    AST (SGOT) 28 0 - 33 U/L    Alkaline Phosphatase 62 25 - 100 U/L    Total Bilirubin 0.4 0.3 - 1.2 mg/dL    eGFR Non African Amer 77 >60 mL/min/1.73    Globulin 2.1 gm/dL    A/G Ratio 2.1 1.5 - 2.5 g/dL    BUN/Creatinine Ratio 17.3 7.0 - 25.0    Anion Gap 8.0 3.0 - 11.0 mmol/L   Lipase   Result Value Ref Range    Lipase 36 6 - 51 U/L   BNP   Result Value Ref Range    BNP 13.0 0.0 - 100.0 pg/mL   CBC Auto Differential   Result Value Ref Range    WBC 9.51 3.50 - 10.80 10*3/mm3    RBC 5.42 4.20 - 5.76 10*6/mm3    Hemoglobin 17.5 13.1 - 17.5 g/dL    Hematocrit 51.1 (H) 38.9 - 50.9 %    MCV 94.3 80.0 - 99.0 fL    MCH 32.3 (H) 27.0 - 31.0 pg    MCHC 34.2 32.0 - 36.0 g/dL    RDW 13.0 11.3 - 14.5 %    RDW-SD 44.6 37.0 - 54.0 fl    MPV 10.4 6.0 - 12.0 fL    Platelets 194 150 - 450 10*3/mm3    Neutrophil % 66.4 41.0 - 71.0 %    Lymphocyte % 22.5 (L) 24.0 - 44.0 %    Monocyte % 9.0 0.0 - 12.0 %    Eosinophil % 1.7 0.0 - 3.0 %    Basophil % 0.4 0.0 - 1.0 %    Immature Grans % 0.5 0.0 - 0.6 %    Neutrophils, Absolute 6.31 1.50 - 8.30 10*3/mm3    Lymphocytes, Absolute 2.14 0.60 - 4.80 10*3/mm3    Monocytes, Absolute 0.86 0.00 - 1.00 10*3/mm3    Eosinophils, Absolute 0.16 0.00 - 0.30 10*3/mm3    Basophils, Absolute 0.04 0.00 - 0.20 10*3/mm3    Immature Grans, Absolute 0.05 0.00 - 0.05 10*3/mm3   POC Troponin, Rapid   Result Value Ref Range    Troponin I 0.00 0.00 - 0.07 ng/mL   POC Troponin, Rapid   Result Value Ref Range    Troponin I 0.00 0.00 - 0.07 ng/mL   EKG 2/24/19: SB at 58, SR  · GXT: 9/14/17: The study was non diagnostic. The patient was unable to reach his target heart rate due to leg weakness, exercising for 4:05 minutes and reaching only 77% of his maximally predicted heart rate. During that time, though, there was no clinical or ECG evidence of myocardial ischemia. There were PACs and PVCs with stress. This test is of indeterminate  risk.         Assessment and Plan:         1. Chest pressure  Had gxt 2017, nondiagnostic because patient did not meet target heart rate.   He notes he is unable to ambulate due dizziness, chronic pain  patricia score: 2 (HTN/HLP/smoker, asa use)  - Stress Test With Myocardial Perfusion (1 Day); Future    2. Lightheadedness    - Stress Test With Myocardial Perfusion (1 Day); Future    3. Dizziness    - Stress Test With Myocardial Perfusion (1 Day); Future    4. Tobacco abuse  It is very important that he quit smoking. There are various alternatives available to help with this difficult task, but first and foremost, he must make a firm commitment and decision to quit. The nature of nicotine addiction is discussed. The correct use, cost and side effects of nicotine replacement therapy such as gum or patches was discussed. Bupropion and its cost (sometimes not covered fully by insurance) and side effects are reviewed.  I recommend he not allow potential costs of treatment to deter him from using nicotine replacement therapy or bupropion, as the long term economic and health benefits are obvious. Education time 2 minutes  It has been a pleasure to participate in the care of this patient.  Patient was instructed to call the [facility] with any questions, concerns, or worsening symptoms.    * Please note that portions of this note were completed with a voice recognition program. Efforts were made to edit the dictation but occasionally words are transcribed.

## 2019-03-04 DIAGNOSIS — Z72.0 TOBACCO USE: ICD-10-CM

## 2019-03-04 DIAGNOSIS — E78.2 MIXED HYPERLIPIDEMIA: ICD-10-CM

## 2019-03-04 DIAGNOSIS — I10 ESSENTIAL HYPERTENSION: ICD-10-CM

## 2019-03-04 DIAGNOSIS — R42 DIZZINESS: Primary | ICD-10-CM

## 2019-03-04 DIAGNOSIS — R07.89 CHEST PRESSURE: ICD-10-CM

## 2019-03-20 ENCOUNTER — HOSPITAL ENCOUNTER (OUTPATIENT)
Dept: CARDIOLOGY | Facility: HOSPITAL | Age: 64
Discharge: HOME | End: 2019-03-20

## 2019-03-20 VITALS
DIASTOLIC BLOOD PRESSURE: 90 MMHG | HEART RATE: 63 BPM | SYSTOLIC BLOOD PRESSURE: 148 MMHG | WEIGHT: 206.57 LBS | BODY MASS INDEX: 28.92 KG/M2 | HEIGHT: 71 IN

## 2019-03-20 DIAGNOSIS — R42 DIZZINESS: ICD-10-CM

## 2019-03-20 DIAGNOSIS — I10 ESSENTIAL HYPERTENSION: ICD-10-CM

## 2019-03-20 DIAGNOSIS — Z72.0 TOBACCO USE: ICD-10-CM

## 2019-03-20 DIAGNOSIS — R07.89 CHEST PRESSURE: ICD-10-CM

## 2019-03-20 DIAGNOSIS — E78.2 MIXED HYPERLIPIDEMIA: ICD-10-CM

## 2019-03-20 LAB
BH CV STRESS BP STAGE 2: NORMAL
BH CV STRESS BP STAGE 4: NORMAL
BH CV STRESS COMMENTS STAGE 1: NORMAL
BH CV STRESS DOSE REGADENOSON STAGE 1: 0.4
BH CV STRESS DURATION MIN STAGE 1: 1
BH CV STRESS DURATION MIN STAGE 2: 1
BH CV STRESS DURATION MIN STAGE 3: 1
BH CV STRESS DURATION MIN STAGE 4: 1
BH CV STRESS DURATION SEC STAGE 2: 0
BH CV STRESS HR STAGE 1: 62
BH CV STRESS HR STAGE 2: 82
BH CV STRESS HR STAGE 3: 87
BH CV STRESS HR STAGE 4: 81
BH CV STRESS PROTOCOL 1: NORMAL
BH CV STRESS RECOVERY BP: NORMAL MMHG
BH CV STRESS RECOVERY HR: 78 BPM
BH CV STRESS STAGE 1: 1
BH CV STRESS STAGE 2: 2
BH CV STRESS STAGE 3: 3
BH CV STRESS STAGE 4: 4
LV EF NUC BP: 64 %
MAXIMAL PREDICTED HEART RATE: 157 BPM
PERCENT MAX PREDICTED HR: 55.41 %
STRESS BASELINE BP: NORMAL MMHG
STRESS BASELINE HR: 61 BPM
STRESS PERCENT HR: 65 %
STRESS POST PEAK BP: NORMAL MMHG
STRESS POST PEAK HR: 87 BPM
STRESS TARGET HR: 133 BPM

## 2019-03-20 PROCEDURE — 25010000002 REGADENOSON 0.4 MG/5ML SOLUTION: Performed by: NURSE PRACTITIONER

## 2019-03-20 PROCEDURE — 0 TECHNETIUM SESTAMIBI: Performed by: NURSE PRACTITIONER

## 2019-03-20 PROCEDURE — 93017 CV STRESS TEST TRACING ONLY: CPT

## 2019-03-20 PROCEDURE — A9500 TC99M SESTAMIBI: HCPCS | Performed by: NURSE PRACTITIONER

## 2019-03-20 PROCEDURE — 78452 HT MUSCLE IMAGE SPECT MULT: CPT | Performed by: INTERNAL MEDICINE

## 2019-03-20 PROCEDURE — 78452 HT MUSCLE IMAGE SPECT MULT: CPT

## 2019-03-20 PROCEDURE — 93018 CV STRESS TEST I&R ONLY: CPT | Performed by: INTERNAL MEDICINE

## 2019-03-20 RX ADMIN — REGADENOSON 0.4 MG: 0.08 INJECTION, SOLUTION INTRAVENOUS at 10:36

## 2019-03-20 RX ADMIN — TECHNETIUM TC 99M SESTAMIBI 1 DOSE: 1 INJECTION INTRAVENOUS at 10:35

## 2019-03-20 RX ADMIN — TECHNETIUM TC 99M SESTAMIBI 1 DOSE: 1 INJECTION INTRAVENOUS at 08:45

## 2019-03-21 ENCOUNTER — TELEPHONE (OUTPATIENT)
Dept: CARDIOLOGY | Facility: HOSPITAL | Age: 64
End: 2019-03-21

## 2019-03-21 NOTE — TELEPHONE ENCOUNTER
Reviewed stress test with patient. Chest pain and dizziness has resolved. Patient encouraged to call his primary care provider to further assess his cause of fatigue.

## 2019-10-22 ENCOUNTER — OFFICE VISIT (OUTPATIENT)
Dept: ORTHOPEDIC SURGERY | Facility: CLINIC | Age: 64
End: 2019-10-22

## 2019-10-22 VITALS — WEIGHT: 202 LBS | BODY MASS INDEX: 28.28 KG/M2 | HEART RATE: 78 BPM | HEIGHT: 71 IN | OXYGEN SATURATION: 98 %

## 2019-10-22 DIAGNOSIS — M25.551 RIGHT HIP PAIN: ICD-10-CM

## 2019-10-22 DIAGNOSIS — S73.191A TEAR OF RIGHT ACETABULAR LABRUM, INITIAL ENCOUNTER: Primary | ICD-10-CM

## 2019-10-22 DIAGNOSIS — M16.11 PRIMARY OSTEOARTHRITIS OF RIGHT HIP: ICD-10-CM

## 2019-10-22 PROCEDURE — 99406 BEHAV CHNG SMOKING 3-10 MIN: CPT | Performed by: ORTHOPAEDIC SURGERY

## 2019-10-22 PROCEDURE — 99204 OFFICE O/P NEW MOD 45 MIN: CPT | Performed by: ORTHOPAEDIC SURGERY

## 2019-10-22 NOTE — PROGRESS NOTES
Orthopaedic Clinic Note: Hip New Patient    Chief Complaint   Patient presents with   • Right Hip - Pain        HPI    Juancarlos Espinal is a 64 y.o. male who presents with right hip pain for 5 year(s). Onset has been atraumatic and gradual nature.  Pain is localized to the groin and lateral trochanter and is a 6/10 on the pain scale.Pain is described as dull, aching and stabbing. Associated symptoms include pain and popping.  The pain is worse with walking, sitting and sleeping; resting and sitting improve the pain. Previous treatments have included: steroid injection (last injection 1 year ago) since symptom onset.  This was an intra-articular injection performed by Emile Thomas at the [facility].  He states that the injection provided approximately 5 months of relief.  His pain however was transiently relieved and his pain has returned.  He is ambulating with no assistive device.  He is here to discuss treatment options for his ongoing hip pain.  He is a pack and half a day smoker.    Past Medical History:   Diagnosis Date   • Anxiety    • Arthritis    • Chronic pain    • COPD (chronic obstructive pulmonary disease) (CMS/[facility])    • Hyperlipemia    • Hypertension       Past Surgical History:   Procedure Laterality Date   • STEROID INJECTION HIP Right       Family History   Problem Relation Age of Onset   • Alzheimer's disease Mother    • Diabetes Mother    • Valvular heart disease Sister    • No Known Problems Maternal Grandmother    • No Known Problems Maternal Grandfather    • No Known Problems Paternal Grandmother    • No Known Problems Paternal Grandfather      Social History     Socioeconomic History   • Marital status:      Spouse name: Not on file   • Number of children: Not on file   • Years of education: Not on file   • Highest education level: Not on file   Tobacco Use   • Smoking status: Current Every Day Smoker     Packs/day: 1.50     Years: 45.00     Pack years: 67.50     Types:  Cigarettes   • Smokeless tobacco: Never Used   Substance and Sexual Activity   • Alcohol use: Yes     Comment: occasional   • Drug use: No   • Sexual activity: Defer   Social History Narrative    Caffeine use: 2-3 cups coffee daily.    Patient lives at home with his son.       Current Outpatient Medications on File Prior to Visit   Medication Sig Dispense Refill   • aspirin 81 MG EC tablet Take 1 tablet by mouth Daily. 30 tablet 0   • atorvastatin (LIPITOR) 80 MG tablet Take 1 tablet by mouth Daily.  0   • ATROVENT HFA 17 MCG/ACT inhaler inhale 1 puff by mouth twice a day  0   • clonazePAM (KlonoPIN) 1 MG tablet Take 1 mg by mouth 2 (Two) Times a Day As Needed for Seizures.     • CloNIDine (CATAPRES) 0.1 MG tablet Take 1 tablet by mouth 3 (Three) Times a Day As Needed for High Blood Pressure (If your blood pressure is over 180). 30 tablet 0   • docusate sodium (COLACE) 250 MG capsule Take 250 mg by mouth Daily.     • folic acid (FOLVITE) 1 MG tablet Take 1 tablet by mouth Daily.  0   • HYDROcodone-acetaminophen (NORCO)  MG per tablet Take 1 tablet by mouth 3 (Three) Times a Day As Needed for Pain.  0   • lisinopril-hydrochlorothiazide (PRINZIDE,ZESTORETIC) 20-25 MG per tablet Take 1 tablet by mouth Daily.  0   • meclizine (ANTIVERT) 25 MG tablet Take 1 tablet by mouth Every 6 (Six) Hours As Needed for dizziness.  0   • montelukast (SINGULAIR) 10 MG tablet Take 1 tablet by mouth Daily.  0     No current facility-administered medications on file prior to visit.       No Known Allergies     Review of Systems   Constitutional: Negative.    HENT: Negative.    Eyes: Negative.    Respiratory: Negative.    Cardiovascular: Negative.    Gastrointestinal: Negative.    Endocrine: Negative.    Genitourinary: Negative.    Musculoskeletal: Positive for arthralgias, back pain, joint swelling and neck stiffness.   Skin: Negative.    Allergic/Immunologic: Negative.    Neurological: Positive for numbness.   Hematological:  "Negative.    Psychiatric/Behavioral: Negative.         The patient's Review of Systems was personally reviewed and confirmed as accurate.    The following portions of the patient's history were reviewed and updated as appropriate: allergies, current medications, past family history, past medical history, past social history, past surgical history and problem list.    Physical Exam  Pulse 78, height 180.3 cm (70.98\"), weight 91.6 kg (202 lb), SpO2 98 %.    Body mass index is 28.19 kg/m².    GENERAL APPEARANCE: awake, alert & oriented x 3, in no acute distress and well developed, well nourished  PSYCH: normal affect  LUNGS:  breathing nonlabored  EYES: sclera anicteric  CARDIOVASCULAR: palpable dorsalis pedis, palpable posterior tibial bilaterally. Capillary refill less than 2 seconds  EXTREMITIES: no clubbing, cyanosis  GAIT:  Normal           Right Hip Exam:  RANGE OF MOTION:   FLEXION CONTRACTURE: None   FLEXION: 110 degrees   INTERNAL ROTATION: 20 degrees at 90 degrees of flexion   EXTERNAL ROTATION: 40 degrees at 90 degrees of flexion    PAIN WITH HIP MOTION: yes, localized to anterolateral hip  PAIN WITH LOGROLL: no  STINCHFIELD TEST: negative    KNEE EXAM: full knee ROM (0-120), stable to varus/valgus stress at terminal extension and 30 degrees     STRENGTH:  5/5 hip adduction, abduction, flexion. 5/5 strength knee flexion, extension. 5/5 strength ankle dorsiflexion and plantarflexion.     GREATER TROCHANTER BURSAL PAIN:  no     REFLEXES:   PATELLAR 2+/4   ACHILLES 2+/4    CLONUS: negative  STRAIGHT LEG TEST:   negative    SENSATION TO LIGHT TOUCH:  DEEP PERONEAL/SUPERFICIAL PERONEAL/SURAL/SAPHENOUS/TIBIAL:  intact    EDEMA:   no  ERYTHEMA:  no  WOUNDS/INCISIONS: no overlying skin problems.      Left Hip Exam:   RANGE OF MOTION:   FLEXION CONTRACTURE: None   FLEXION: 110 degrees   INTERNAL ROTATION: 20 degrees at 90 degrees of flexion   EXTERNAL ROTATION: 40 degrees at 90 degrees of flexion    PAIN WITH HIP " MOTION: no  PAIN WITH LOGROLL: no  STINCHFIELD TEST: negative    KNEE EXAM: full knee ROM (0-120), stable to varus/valgus stress at terminal extension and 30 degrees     STRENGTH:  5/5 hip adduction, abduction, flexion. 5/5 strength knee flexion, extension. 5/5 strength ankle dorsiflexion and plantarflexion.     GREATER TROCHANTER BURSAL PAIN:  no     REFLEXES:   PATELLAR 2+/4   ACHILLES 2+/4    CLONUS: negative  STRAIGHT LEG TEST:   negative    SENSATION TO LIGHT TOUCH:  DEEP PERONEAL/SUPERFICIAL PERONEAL/SURAL/SAPHENOUS/TIBIAL:  intact    EDEMA:   no  ERYTHEMA:  no  WOUNDS/INCISIONS: no overlying skin problems.      ------------------------------------------------------------------    LEG LENGTHS:  equal  _____________________________________________________  _____________________________________________________    RADIOGRAPHIC FINDINGS:   Indication: Right hip pain    Comparison: No prior xrays are available for comparison    AP pelvis, hip 2 views: Right: moderate joint space narrowing,  there are marginal osteophytes visualized at the femoral head-neck junction and acetabular margins; Left: moderate joint space narrowing,  there are marginal osteophytes visualized at the femoral head-neck junction and acetabular margins    MRI from 2016 was reviewed and reveals evidence of labral tear in the right hip.  Mild to moderate arthritic changes.    Assessment/Plan:   Diagnosis Plan   1. Tear of right acetabular labrum, initial encounter  [place] Guide For Pain Meds Inj   2. Right hip pain  XR Hip With or Without Pelvis 2 - 3 View Right   3. Primary osteoarthritis of right hip       Patient has preservation of hip motion with symptoms isolated to the hip joint.  He has a symptomatic labral tear with associated arthritis.  I discussed treatment options.  He is agreeable to proceeding with intra-articular cortisone injection.  He will follow-up as needed.    I spent approximately 5-10 minutes counseling the patient regarding  the adverse effects of tobacco use.  Included in this counseling session were treatment options for cessation including quitting cold turkey, medication, nicotine step-down programs, and smoking cessation programs.  Furthermore, I explained to the patient the importance of abstaining from nicotine usage as nicotine is known to increase the risk of complications associated with surgery including but not limited to infection, decreased wound healing, slowed soft tissue healing, and increased surgery associated pain.  As a result of this counseling session, the patient will weigh the options and determine how to proceed with achieving tobacco cessation in preparation for surgery.    Aj Meraz MD  10/22/19  1:37 PM

## 2019-10-25 ENCOUNTER — HOSPITAL ENCOUNTER (OUTPATIENT)
Dept: GENERAL RADIOLOGY | Facility: HOSPITAL | Age: 64
Discharge: HOME | End: 2019-10-25
Admitting: ORTHOPAEDIC SURGERY

## 2019-10-25 DIAGNOSIS — S73.191A TEAR OF RIGHT ACETABULAR LABRUM, INITIAL ENCOUNTER: ICD-10-CM

## 2019-10-25 PROCEDURE — 77002 NEEDLE LOCALIZATION BY XRAY: CPT

## 2019-10-25 PROCEDURE — 25010000002 IOPAMIDOL 61 % SOLUTION: Performed by: ORTHOPAEDIC SURGERY

## 2019-10-25 PROCEDURE — 25010000002 TRIAMCINOLONE PER 10 MG: Performed by: ORTHOPAEDIC SURGERY

## 2019-10-25 PROCEDURE — 25010000003 LIDOCAINE 1 % SOLUTION: Performed by: RADIOLOGY

## 2019-10-25 RX ORDER — LIDOCAINE HYDROCHLORIDE 10 MG/ML
10 INJECTION, SOLUTION INFILTRATION; PERINEURAL ONCE
Status: COMPLETED | OUTPATIENT
Start: 2019-10-25 | End: 2019-10-25

## 2019-10-25 RX ORDER — LIDOCAINE HYDROCHLORIDE 10 MG/ML
3 INJECTION, SOLUTION EPIDURAL; INFILTRATION; INTRACAUDAL; PERINEURAL ONCE
Status: COMPLETED | OUTPATIENT
Start: 2019-10-25 | End: 2019-10-25

## 2019-10-25 RX ORDER — BUPIVACAINE HYDROCHLORIDE 2.5 MG/ML
3 INJECTION, SOLUTION INFILTRATION; PERINEURAL ONCE
Status: COMPLETED | OUTPATIENT
Start: 2019-10-25 | End: 2019-10-25

## 2019-10-25 RX ORDER — TRIAMCINOLONE ACETONIDE 40 MG/ML
80 INJECTION, SUSPENSION INTRA-ARTICULAR; INTRAMUSCULAR ONCE
Status: COMPLETED | OUTPATIENT
Start: 2019-10-25 | End: 2019-10-25

## 2019-10-25 RX ADMIN — LIDOCAINE HYDROCHLORIDE 3 ML: 10 INJECTION, SOLUTION EPIDURAL; INFILTRATION; INTRACAUDAL; PERINEURAL at 15:10

## 2019-10-25 RX ADMIN — BUPIVACAINE HYDROCHLORIDE 3 ML: 2.5 INJECTION, SOLUTION INFILTRATION; PERINEURAL at 15:10

## 2019-10-25 RX ADMIN — TRIAMCINOLONE ACETONIDE 80 MG: 40 INJECTION, SUSPENSION INTRA-ARTICULAR; INTRAMUSCULAR at 15:10

## 2019-10-25 RX ADMIN — IOPAMIDOL 10 ML: 612 INJECTION, SOLUTION INTRAVENOUS at 15:10

## 2019-10-25 RX ADMIN — LIDOCAINE HYDROCHLORIDE 10 ML: 10 INJECTION, SOLUTION INFILTRATION; PERINEURAL at 15:10

## 2020-02-04 ENCOUNTER — OFFICE VISIT (OUTPATIENT)
Dept: ORTHOPEDIC SURGERY | Facility: CLINIC | Age: 65
End: 2020-02-04

## 2020-02-04 VITALS — HEIGHT: 71 IN | HEART RATE: 87 BPM | OXYGEN SATURATION: 99 % | WEIGHT: 208 LBS | BODY MASS INDEX: 29.12 KG/M2

## 2020-02-04 DIAGNOSIS — M75.81 TENDINITIS OF RIGHT ROTATOR CUFF: Primary | ICD-10-CM

## 2020-02-04 PROCEDURE — 99214 OFFICE O/P EST MOD 30 MIN: CPT | Performed by: PHYSICIAN ASSISTANT

## 2020-02-04 PROCEDURE — 20610 DRAIN/INJ JOINT/BURSA W/O US: CPT | Performed by: PHYSICIAN ASSISTANT

## 2020-02-04 RX ORDER — MELOXICAM 15 MG/1
15 TABLET ORAL DAILY
COMMUNITY
Start: 2020-01-05

## 2020-02-04 RX ADMIN — METHYLPREDNISOLONE ACETATE 40 MG: 40 INJECTION, SUSPENSION INTRA-ARTICULAR; INTRALESIONAL; INTRAMUSCULAR; SOFT TISSUE at 11:50

## 2020-02-04 RX ADMIN — LIDOCAINE HYDROCHLORIDE 4 ML: 10 INJECTION, SOLUTION EPIDURAL; INFILTRATION; INTRACAUDAL; PERINEURAL at 11:50

## 2020-02-04 NOTE — PROGRESS NOTES
[facility] [place] [facility]    Subjective     Patient: Juancarlos Rios Espinal  : 1955    Primary Care Provider: Francine Spence APRN    Requesting Provider: As above    Pain of the Right Shoulder      History    Chief Complaint: Right shoulder pain    History of Present Illness: This is a very pleasant patient of Dr. Meraz's, new to me, here to discuss his right shoulder pain that began 3 weeks ago.  He is right-hand dominant.  Patient reports pain began when he was tugging on some wires.  He is an .  He complains of shoulder pain with radiating arm pain.  He does complain of some numbness and tingling in the hand.  He has no radiating pain past the elbow.  He has both functional pain and night pain.  He rates it to be 8/10 and shooting.  He has treated with Norco.  He is here for further evaluation and treatment recommendations he did have MRI of the cervical spine which does show some degenerative changes.  He also had MRI of the shoulder showing a minimal insertional tear of the supraspinatus and acromioclavicular arthritis.    Current Outpatient Medications on File Prior to Visit   Medication Sig Dispense Refill   • ANORO ELLIPTA 62.5-25 MCG/INH aerosol powder  inhaler INHALE 1 PUFF BY MOUTH AND ITL ONCE DAILY     • aspirin 81 MG EC tablet Take 1 tablet by mouth Daily. 30 tablet 0   • atorvastatin (LIPITOR) 80 MG tablet Take 1 tablet by mouth Daily.  0   • ATROVENT HFA 17 MCG/ACT inhaler inhale 1 puff by mouth twice a day  0   • clonazePAM (KlonoPIN) 1 MG tablet Take 1 mg by mouth 2 (Two) Times a Day As Needed for Seizures.     • CloNIDine (CATAPRES) 0.1 MG tablet Take 1 tablet by mouth 3 (Three) Times a Day As Needed for High Blood Pressure (If your blood pressure is over 180). 30 tablet 0   • docusate sodium (COLACE) 250 MG capsule Take 250 mg by mouth Daily.     • folic acid (FOLVITE) 1 MG tablet Take 1 tablet by mouth Daily.  0   • HYDROcodone-acetaminophen (NORCO)   MG per tablet Take 1 tablet by mouth 3 (Three) Times a Day As Needed for Pain.  0   • lisinopril-hydrochlorothiazide (PRINZIDE,ZESTORETIC) 20-25 MG per tablet Take 1 tablet by mouth Daily.  0   • meclizine (ANTIVERT) 25 MG tablet Take 1 tablet by mouth Every 6 (Six) Hours As Needed for dizziness.  0   • meloxicam (MOBIC) 15 MG tablet Take 15 mg by mouth Daily.     • montelukast (SINGULAIR) 10 MG tablet Take 1 tablet by mouth Daily.  0     No current facility-administered medications on file prior to visit.       No Known Allergies   Past Medical History:   Diagnosis Date   • Anxiety    • Arthritis    • Chronic pain    • COPD (chronic obstructive pulmonary disease) (CMS/[facility])    • Hyperlipemia    • Hypertension      Past Surgical History:   Procedure Laterality Date   • STEROID INJECTION HIP Right      Family History   Problem Relation Age of Onset   • Alzheimer's disease Mother    • Diabetes Mother    • Valvular heart disease Sister    • No Known Problems Maternal Grandmother    • No Known Problems Maternal Grandfather    • No Known Problems Paternal Grandmother    • No Known Problems Paternal Grandfather       Social History     Socioeconomic History   • Marital status:      Spouse name: Not on file   • Number of children: Not on file   • Years of education: Not on file   • Highest education level: Not on file   Tobacco Use   • Smoking status: Current Every Day Smoker     Packs/day: 1.50     Years: 45.00     Pack years: 67.50     Types: Cigarettes   • Smokeless tobacco: Never Used   Substance and Sexual Activity   • Alcohol use: Yes     Comment: occasional   • Drug use: No   • Sexual activity: Defer   Social History Narrative    Caffeine use: 2-3 cups coffee daily.    Patient lives at home with his son.         Review of Systems   Constitutional: Negative.    HENT: Negative.    Eyes: Negative.    Respiratory: Negative.    Cardiovascular: Negative.    Gastrointestinal: Negative.    Endocrine: Negative.  "   Genitourinary: Negative.    Musculoskeletal: Positive for arthralgias.   Skin: Negative.    Allergic/Immunologic: Negative.    Neurological: Negative.    Hematological: Negative.    Psychiatric/Behavioral: Negative.        The following portions of the patient's history were reviewed and updated as appropriate: allergies, current medications, past family history, past medical history, past social history, past surgical history and problem list.      Objective      Physical Exam  Pulse 87   Ht 180.3 cm (70.98\")   Wt 94.3 kg (208 lb)   SpO2 99%   BMI 29.02 kg/m²     Body mass index is 29.02 kg/m².    Patient is well developed, well nourished and in no acute distress.  Alert and oriented x 3.    Ortho Exam  Musculoskeletal   Upper Extremity   Right Shoulder     Inspection and Palpation:     Tenderness - anterolateral acromion tenderness    Crepitus - none    Sensation is normal    Examination reveals no ecchymosis.        Strength and Tone:    Supraspinatus -5/5    External Rotators-5/5    Infraspinatus - 5/5    Subscapularis - 5/5    Deltoid - 5/5     Range of Motion   Left shoulder:    Internal Rotation: ROM - T7    External Rotation: AROM - 80 degrees    Elevation through flexion: AROM - 180 degrees    Right Shoulder:    Internal Rotation: ROM - T7    External Rotation: AROM - 80 degrees    Elevation through flexion: AROM - 180 degrees         Impingement   Right shoulder    Myles-Isaias impingement test positive    Neer impingement test positive     Functional Testing   Right shoulder    AC crossover adduction test negative    Abdominal compression test negative    Lift-off sign negative          Medical Decision Making    Data Review:   reviewed radiology images    Assessment:  1. Tendinitis of right rotator cuff        Plan:  Right rotator cuff tendinitis.  I reviewed MRI of the shoulder from 1/23/2020, x-rays of the shoulder and MRI of the cervical spine and clinical findings with the patient.  On exam " he has good motion and strength.  He has positive Myles.  MRI of the cervical spine shows degenerative changes multilevel.  The shoulder MRI shows minimal tear of the supraspinatus tendon with acromioclavicular arthritis.  I explained to the patient that if we did an MRI and everyone over the age of 40 there would be some abnormality in the rotator cuff from wear and tear.  I think his pain and functional imitations are secondary to rotator cuff tendinitis.  Recommendation today is subacromial steroid injection followed by round of physical therapy.  I will see him back in 6 to 8 weeks to see us progress or sooner if needed.    History, diagnosis and treatment plan discussed with Dr. Meraz.    Using sterile technique, the right shoulder was sterilely prepped with Hibiclens.  Using a 22 gauge needle, the right subacromial bursa was injected with 40mg Depo Medrol and 4 cc lidocaine.  Patient tolerated the procedure well. No complications.            Jessica Montilla PA-C  02/05/20  10:32 AM

## 2020-02-04 NOTE — PROGRESS NOTES
Procedure   Large Joint Arthrocentesis: R subacromial bursa  Date/Time: 2/4/2020 11:50 AM  Consent given by: patient  Site marked: site marked  Timeout: Immediately prior to procedure a time out was called to verify the correct patient, procedure, equipment, support staff and site/side marked as required   Supporting Documentation  Indications: pain   Procedure Details  Location: shoulder - R subacromial bursa  Preparation: Patient was prepped and draped in the usual sterile fashion  Needle size: 22 G  Approach: posterior  Medications administered: 4 mL lidocaine PF 1% 1 %; 40 mg methylPREDNISolone acetate 40 MG/ML  Patient tolerance: patient tolerated the procedure well with no immediate complications

## 2020-02-05 RX ORDER — LIDOCAINE HYDROCHLORIDE 10 MG/ML
4 INJECTION, SOLUTION EPIDURAL; INFILTRATION; INTRACAUDAL; PERINEURAL
Status: COMPLETED | OUTPATIENT
Start: 2020-02-04 | End: 2020-02-04

## 2020-02-05 RX ORDER — METHYLPREDNISOLONE ACETATE 40 MG/ML
40 INJECTION, SUSPENSION INTRA-ARTICULAR; INTRALESIONAL; INTRAMUSCULAR; SOFT TISSUE
Status: COMPLETED | OUTPATIENT
Start: 2020-02-04 | End: 2020-02-04

## 2020-05-19 ENCOUNTER — OFFICE VISIT (OUTPATIENT)
Dept: ORTHOPEDIC SURGERY | Facility: CLINIC | Age: 65
End: 2020-05-19

## 2020-05-19 VITALS — OXYGEN SATURATION: 98 % | HEART RATE: 75 BPM | BODY MASS INDEX: 29.12 KG/M2 | HEIGHT: 71 IN | WEIGHT: 208 LBS

## 2020-05-19 DIAGNOSIS — M25.551 RIGHT HIP PAIN: ICD-10-CM

## 2020-05-19 DIAGNOSIS — M70.61 GREATER TROCHANTERIC BURSITIS OF RIGHT HIP: ICD-10-CM

## 2020-05-19 DIAGNOSIS — M70.61 TROCHANTERIC BURSITIS OF RIGHT HIP: ICD-10-CM

## 2020-05-19 DIAGNOSIS — M75.81 TENDINITIS OF RIGHT ROTATOR CUFF: Primary | ICD-10-CM

## 2020-05-19 PROCEDURE — 20610 DRAIN/INJ JOINT/BURSA W/O US: CPT | Performed by: PHYSICIAN ASSISTANT

## 2020-05-19 PROCEDURE — 99213 OFFICE O/P EST LOW 20 MIN: CPT | Performed by: PHYSICIAN ASSISTANT

## 2020-05-19 RX ADMIN — LIDOCAINE HYDROCHLORIDE 4 ML: 10 INJECTION, SOLUTION EPIDURAL; INFILTRATION; INTRACAUDAL; PERINEURAL at 10:29

## 2020-05-19 RX ADMIN — METHYLPREDNISOLONE ACETATE 40 MG: 40 INJECTION, SUSPENSION INTRA-ARTICULAR; INTRALESIONAL; INTRAMUSCULAR; SOFT TISSUE at 10:28

## 2020-05-19 RX ADMIN — LIDOCAINE HYDROCHLORIDE 4 ML: 10 INJECTION, SOLUTION EPIDURAL; INFILTRATION; INTRACAUDAL; PERINEURAL at 10:28

## 2020-05-19 RX ADMIN — METHYLPREDNISOLONE ACETATE 40 MG: 40 INJECTION, SUSPENSION INTRA-ARTICULAR; INTRALESIONAL; INTRAMUSCULAR; SOFT TISSUE at 10:29

## 2020-05-19 NOTE — PROGRESS NOTES
[facility] – [place] [facility] Note    Subjective     Patient: Juancarlos Espinal  : 1955    Primary Care Provider: Francine Spence APRN    Requesting Provider: As above    Pain of the Right Hip and Follow-up (3 month follow up - Tendinitis of right rotator cuff - injectrion given 20)      History    Chief Complaint: New right hip pain and follow-up left shoulder    History of Present Illness: Patient returns today with new right hip pain.  He has been treated in the past by Dr. Meraz for a symptomatic labral tear with intra-articular injection.  He reports this pain is different.  Is all lateral based pain with weightbearing and walking.  He has minimal groin pain.  He has pain lying on his right side.  He reports that he does not really remember getting the articula joint injection but is not having any  or groin pain at this point.  He denies any radiating pain.    Patient reports that the shoulder continues to bother him.  He did have subacromial steroid injection back in February which gave him relief for short time.  He was unable to get to physical therapy.  He complains of no new symptoms with persisting shoulder pain with overhead activity and lifting with some radiating pain down the arm.  He has had prior MRI of the shoulder as well as the cervical spine.    Current Outpatient Medications on File Prior to Visit   Medication Sig Dispense Refill   • ANORO ELLIPTA 62.5-25 MCG/INH aerosol powder  inhaler INHALE 1 PUFF BY MOUTH AND ITL ONCE DAILY     • aspirin 81 MG EC tablet Take 1 tablet by mouth Daily. 30 tablet 0   • atorvastatin (LIPITOR) 80 MG tablet Take 1 tablet by mouth Daily.  0   • ATROVENT HFA 17 MCG/ACT inhaler inhale 1 puff by mouth twice a day  0   • clonazePAM (KlonoPIN) 1 MG tablet Take 1 mg by mouth 2 (Two) Times a Day As Needed for Seizures.     • CloNIDine (CATAPRES) 0.1 MG tablet Take 1 tablet by mouth 3 (Three) Times a Day As Needed for High Blood Pressure  (If your blood pressure is over 180). 30 tablet 0   • docusate sodium (COLACE) 250 MG capsule Take 250 mg by mouth Daily.     • folic acid (FOLVITE) 1 MG tablet Take 1 tablet by mouth Daily.  0   • HYDROcodone-acetaminophen (NORCO)  MG per tablet Take 1 tablet by mouth 3 (Three) Times a Day As Needed for Pain.  0   • lisinopril-hydrochlorothiazide (PRINZIDE,ZESTORETIC) 20-25 MG per tablet Take 1 tablet by mouth Daily.  0   • meclizine (ANTIVERT) 25 MG tablet Take 1 tablet by mouth Every 6 (Six) Hours As Needed for dizziness.  0   • meloxicam (MOBIC) 15 MG tablet Take 15 mg by mouth Daily.     • montelukast (SINGULAIR) 10 MG tablet Take 1 tablet by mouth Daily.  0     No current facility-administered medications on file prior to visit.       No Known Allergies   Past Medical History:   Diagnosis Date   • Anxiety    • Arthritis    • Chronic pain    • COPD (chronic obstructive pulmonary disease) (CMS/[facility])    • Hyperlipemia    • Hypertension      Past Surgical History:   Procedure Laterality Date   • STEROID INJECTION HIP Right      Family History   Problem Relation Age of Onset   • Alzheimer's disease Mother    • Diabetes Mother    • Valvular heart disease Sister    • No Known Problems Maternal Grandmother    • No Known Problems Maternal Grandfather    • No Known Problems Paternal Grandmother    • No Known Problems Paternal Grandfather       Social History     Socioeconomic History   • Marital status:      Spouse name: Not on file   • Number of children: Not on file   • Years of education: Not on file   • Highest education level: Not on file   Tobacco Use   • Smoking status: Current Every Day Smoker     Packs/day: 1.50     Years: 45.00     Pack years: 67.50     Types: Cigarettes   • Smokeless tobacco: Never Used   Substance and Sexual Activity   • Alcohol use: Yes     Comment: occasional   • Drug use: No   • Sexual activity: Defer   Social History Narrative    Caffeine use: 2-3 cups coffee daily.     "Patient lives at home with his son.         Review of Systems   Constitutional: Negative.    HENT: Negative.    Eyes: Negative.    Respiratory: Negative.    Cardiovascular: Negative.    Gastrointestinal: Negative.    Endocrine: Negative.    Genitourinary: Negative.    Musculoskeletal: Positive for back pain and joint swelling.   Skin: Negative.    Allergic/Immunologic: Negative.    Neurological: Negative.    Hematological: Negative.    Psychiatric/Behavioral: Negative.        The following portions of the patient's history were reviewed and updated as appropriate: allergies, current medications, past family history, past medical history, past social history, past surgical history and problem list.      Objective      Physical Exam  Pulse 75   Ht 180.3 cm (70.98\")   Wt 94.3 kg (208 lb)   SpO2 98%   BMI 29.02 kg/m²     Body mass index is 29.02 kg/m².    Patient is well developed, well nourished and in no acute distress.  Alert and oriented x 3.    Ortho Exam  Musculoskeletal   Upper Extremity   Right Shoulder     Inspection and Palpation:     Tenderness - anterolateral acromion with mild AC tenderness. No cervical tenderness.    Crepitus - none    Sensation is normal    Examination reveals no ecchymosis.        Strength and Tone:    Supraspinatus -5/5     External Rotators-5/5    Infraspinatus - 5/5    Subscapularis - 5/5    Deltoid - 5/5     Range of Motion   Left shoulder:    Internal Rotation: ROM - T7    External Rotation: AROM - 80 degrees    Elevation through flexion: AROM - 180 degrees    Right Shoulder:    Internal Rotation: ROM - T7    External Rotation: AROM - 80 degrees    Elevation through flexion: AROM - 180 degrees          Medical Decision Making    Data Review:   none    Assessment:  1. Tendinitis of right rotator cuff    2. Right hip pain    3. Trochanteric bursitis of right hip        Plan:  1. Right rotator cuff tendonitis.  Patient reports subacromial injection in February gave him short term " relief.  He was unable to get to PT.  The shoulder progressively gets worse.  I explained to him that the most important thing for him at this point is for him to get into physical therapy.  Plan today is repeat subacromial steroid injection as well as a round of physical therapy.  We have called in [facility] rehab takes his insurance.  I have given a prescription.  I will see him back in 6 to 8 weeks or sooner if needed.    2.  Right trochanteric bursitis.  Patient is tender over the right greater trochanter with normal hip motion and no reproducible groin pain.  I think his pain and functional imitations are secondary to trochanteric bursitis.  I explained that this is best treated with steroid injection and physical therapy.  I also talked to him regarding his tobacco use and it increases risk of bursitis is as well as makes the more difficult to treat.  I explained tendinitis/bursitis to him.  Plan today is steroid injection into the right trochanteric bursa.  He will do PT for his shoulder and his hip.    Using sterile technique, the right hip was sterilely prepped with Hibiclens.  Using a 22 gauge needle, the right trochanteric bursa was injected with 40mg Depo Medrol, 4 cc lidocaine.  Patient tolerated the procedure well. No complications.      Using sterile technique, the right shoulder was sterilely prepped with Hibiclens.  Using a 22 gauge needle, the right subacromial bursa was injected with 40mg Depo Medrol and 4 cc lidocaine.  Patient tolerated the procedure well. No complications.            Jessica Montilla PA-C  05/20/20  09:00

## 2020-05-19 NOTE — PROGRESS NOTES
Procedure   Large Joint Arthrocentesis: R subacromial bursa  Date/Time: 5/19/2020 10:28 AM  Consent given by: patient  Site marked: site marked  Timeout: Immediately prior to procedure a time out was called to verify the correct patient, procedure, equipment, support staff and site/side marked as required   Supporting Documentation  Indications: pain   Procedure Details  Location: shoulder - R subacromial bursa  Preparation: Patient was prepped and draped in the usual sterile fashion  Needle size: 22 G  Approach: posterior  Medications administered: 4 mL lidocaine PF 1% 1 %; 40 mg methylPREDNISolone acetate 40 MG/ML  Patient tolerance: patient tolerated the procedure well with no immediate complications    Large Joint Arthrocentesis: R greater trochanteric bursa  Date/Time: 5/19/2020 10:29 AM  Consent given by: patient  Site marked: site marked  Timeout: Immediately prior to procedure a time out was called to verify the correct patient, procedure, equipment, support staff and site/side marked as required   Supporting Documentation  Indications: pain   Procedure Details  Location: hip - R greater trochanteric bursa  Preparation: Patient was prepped and draped in the usual sterile fashion  Needle size: 22 G  Approach: lateral  Medications administered: 4 mL lidocaine PF 1% 1 %; 40 mg methylPREDNISolone acetate 40 MG/ML  Patient tolerance: patient tolerated the procedure well with no immediate complications

## 2020-05-20 RX ORDER — METHYLPREDNISOLONE ACETATE 40 MG/ML
40 INJECTION, SUSPENSION INTRA-ARTICULAR; INTRALESIONAL; INTRAMUSCULAR; SOFT TISSUE
Status: COMPLETED | OUTPATIENT
Start: 2020-05-19 | End: 2020-05-19

## 2020-05-20 RX ORDER — LIDOCAINE HYDROCHLORIDE 10 MG/ML
4 INJECTION, SOLUTION EPIDURAL; INFILTRATION; INTRACAUDAL; PERINEURAL
Status: COMPLETED | OUTPATIENT
Start: 2020-05-19 | End: 2020-05-19

## 2020-07-28 ENCOUNTER — OFFICE VISIT (OUTPATIENT)
Dept: ORTHOPEDIC SURGERY | Facility: CLINIC | Age: 65
End: 2020-07-28

## 2020-07-28 VITALS — WEIGHT: 203.4 LBS | OXYGEN SATURATION: 96 % | HEART RATE: 90 BPM | HEIGHT: 71 IN | BODY MASS INDEX: 28.48 KG/M2

## 2020-07-28 DIAGNOSIS — Z72.0 TOBACCO USE: ICD-10-CM

## 2020-07-28 DIAGNOSIS — M75.81 TENDINITIS OF RIGHT ROTATOR CUFF: Primary | ICD-10-CM

## 2020-07-28 DIAGNOSIS — M70.61 GREATER TROCHANTERIC BURSITIS OF RIGHT HIP: ICD-10-CM

## 2020-07-28 PROCEDURE — 20610 DRAIN/INJ JOINT/BURSA W/O US: CPT | Performed by: PHYSICIAN ASSISTANT

## 2020-07-28 PROCEDURE — 99212 OFFICE O/P EST SF 10 MIN: CPT | Performed by: PHYSICIAN ASSISTANT

## 2020-07-28 RX ADMIN — LIDOCAINE HYDROCHLORIDE 4 ML: 10 INJECTION, SOLUTION EPIDURAL; INFILTRATION; INTRACAUDAL; PERINEURAL at 13:27

## 2020-07-28 RX ADMIN — TRIAMCINOLONE ACETONIDE 40 MG: 40 INJECTION, SUSPENSION INTRA-ARTICULAR; INTRAMUSCULAR at 13:27

## 2020-07-28 NOTE — PROGRESS NOTES
[facility] – [place] [facility] Note    Subjective     Patient: Juancarlos Espinal  : 1955    Primary Care Provider: Francine Spence APRN    Requesting Provider: As above    Follow-up (10 week follow up; Tendinitis of right rotator cuff (last subacromial cortisone injection given on 20), Trochanteric bursitis of right hip (last cortisone injection given 20))      History    Chief Complaint: f/up right shoulder and right hip    History of Present Illness: Patient returns today for follow-up of his right shoulder and right hip.  He reports that the injection in the shoulder helped for about 6 weeks.  The pain has returned.  He has the same pain with lifting and overhead activity.  He has not gotten to physical therapy yet.  He has had 2 subacromial injections at this point.  The injection for the trochanteric bursitis also helped for several months.  Again, he was unable to get to physical therapy.  He is here today in hopes of some more pain relief.    Current Outpatient Medications on File Prior to Visit   Medication Sig Dispense Refill   • ANORO ELLIPTA 62.5-25 MCG/INH aerosol powder  inhaler INHALE 1 PUFF BY MOUTH AND ITL ONCE DAILY     • aspirin 81 MG EC tablet Take 1 tablet by mouth Daily. 30 tablet 0   • atorvastatin (LIPITOR) 80 MG tablet Take 1 tablet by mouth Daily.  0   • ATROVENT HFA 17 MCG/ACT inhaler inhale 1 puff by mouth twice a day  0   • clonazePAM (KlonoPIN) 1 MG tablet Take 1 mg by mouth 2 (Two) Times a Day As Needed for Seizures.     • CloNIDine (CATAPRES) 0.1 MG tablet Take 1 tablet by mouth 3 (Three) Times a Day As Needed for High Blood Pressure (If your blood pressure is over 180). 30 tablet 0   • docusate sodium (COLACE) 250 MG capsule Take 250 mg by mouth Daily.     • folic acid (FOLVITE) 1 MG tablet Take 1 tablet by mouth Daily.  0   • HYDROcodone-acetaminophen (NORCO)  MG per tablet Take 1 tablet by mouth 3 (Three) Times a Day As Needed for Pain.  0   •  lisinopril-hydrochlorothiazide (PRINZIDE,ZESTORETIC) 20-25 MG per tablet Take 1 tablet by mouth Daily.  0   • meclizine (ANTIVERT) 25 MG tablet Take 1 tablet by mouth Every 6 (Six) Hours As Needed for dizziness.  0   • meloxicam (MOBIC) 15 MG tablet Take 15 mg by mouth Daily.     • montelukast (SINGULAIR) 10 MG tablet Take 1 tablet by mouth Daily.  0     No current facility-administered medications on file prior to visit.       No Known Allergies   Past Medical History:   Diagnosis Date   • Anxiety    • Arthritis    • Chronic pain    • COPD (chronic obstructive pulmonary disease) (CMS/[facility])    • Hyperlipemia    • Hypertension      Past Surgical History:   Procedure Laterality Date   • STEROID INJECTION HIP Right      Family History   Problem Relation Age of Onset   • Alzheimer's disease Mother    • Diabetes Mother    • Valvular heart disease Sister    • No Known Problems Maternal Grandmother    • No Known Problems Maternal Grandfather    • No Known Problems Paternal Grandmother    • No Known Problems Paternal Grandfather       Social History     Socioeconomic History   • Marital status:      Spouse name: Not on file   • Number of children: Not on file   • Years of education: Not on file   • Highest education level: Not on file   Tobacco Use   • Smoking status: Current Every Day Smoker     Packs/day: 1.50     Years: 45.00     Pack years: 67.50     Types: Cigarettes   • Smokeless tobacco: Never Used   Substance and Sexual Activity   • Alcohol use: Yes     Comment: occasional   • Drug use: No   • Sexual activity: Defer   Social History Narrative    Caffeine use: 2-3 cups coffee daily.    Patient lives at home with his son.         Review of Systems   Constitutional: Negative.    HENT: Negative.    Eyes: Negative.    Respiratory: Negative.    Cardiovascular: Negative.    Gastrointestinal: Negative.    Endocrine: Negative.    Genitourinary: Negative.    Musculoskeletal: Positive for arthralgias.   Skin: Negative.   "  Allergic/Immunologic: Negative.    Neurological: Negative.    Hematological: Negative.    Psychiatric/Behavioral: Negative.        The following portions of the patient's history were reviewed and updated as appropriate: allergies, current medications, past family history, past medical history, past social history, past surgical history and problem list.      Objective      Physical Exam  Pulse 90   Ht 180.3 cm (70.98\")   Wt 92.3 kg (203 lb 6.4 oz)   SpO2 96%   BMI 28.38 kg/m²     Body mass index is 28.38 kg/m².    Patient is well developed, well nourished and in no acute distress.  Alert and oriented x 3.    Ortho Exam  Musculoskeletal   Upper Extremity   Right Shoulder     Inspection and Palpation:     Tenderness - over the supraspinatus and mild biciptal groove tendnerness    Crepitus - none    Sensation is normal    Examination reveals no ecchymosis.        Strength and Tone:    Supraspinatus -5/5    External Rotators-5/5    Infraspinatus - 5/5    Subscapularis - 5/5    Deltoid - 5/5     Range of Motion   Left shoulder:    Internal Rotation: ROM - T7    External Rotation: AROM - 80 degrees    Elevation through flexion: AROM - 180 degrees    Right Shoulder:    Internal Rotation: ROM - T7    External Rotation: AROM - 80 degrees    Elevation through flexion: AROM - 180 degrees      Right  hip    RANGE OF MOTION:   FLEXION CONTRACTURE: None   FLEXION: 110 degrees   INTERNAL ROTATION: 20 degrees at 90 degrees of flexion   EXTERNAL ROTATION: 40 degrees at 90 degrees of flexion    PAIN WITH HIP MOTION: no  STRENGTH:  5/5 hip adduction, abduction, flexion. 5/5 strength knee flexion, extension. 5/5 strength ankle dorsiflexion and plantarflexion.     GREATER TROCHANTER BURSAL PAIN:  Moderately tender    SENSATION TO LIGHT TOUCH:  DEEP PERONEAL/SUPERFICIAL PERONEAL/SURAL/SAPHENOUS/TIBIAL:  intact    EDEMA:   no  ERYTHEMA:  no  WOUNDS/INCISIONS: none, no overlying skin problems.            Medical Decision " Making    Data Review:   none    Assessment:  1. Tendinitis of right rotator cuff    2. Greater trochanteric bursitis of right hip    3. Tobacco use        Plan:  1. Right rotator cuff tendonitis.  Patient has gotten relief with 2 prior subacromial injections.  The last was in May 2020.  He has not been able to get to physical therapy.  He did have an MRI in January 2020 which showed minimal supraspinatus tear and AC arthritis.  I explained to the patient that we cannot do another subacromial injection at this point.  He would benefit most from PT.  He initally wasn't able to go due to  COVID.  He is scheduled on August 5th.   I explained that we can do one more injection and that is all.  He has gotten excellent relief in the past.  Plan is he do PT and will return should he continue to have persisting pain.    2. Right troch bursitis.  Pain has returned to baseline.  He denies any groin pain.  Plan today is repeat steroid injection into the right trochanteric bursa.  He will return following PT if needed.      3.  Patient is a daily smoker and I reviewed with him that smoking increases his risk of getting enthesopathies as well as makes them difficult to treat.    Using sterile technique, the right hip was sterilely prepped with Hibiclens.  Using a 22 gauge needle, the right trochanteric bursa was injected with 40mg Kenolog, 4 cc lidocaine.  Patient tolerated the procedure well. No complications.            Jessica Montilla PA-C  07/29/20  09:37

## 2020-07-29 RX ORDER — LIDOCAINE HYDROCHLORIDE 10 MG/ML
4 INJECTION, SOLUTION EPIDURAL; INFILTRATION; INTRACAUDAL; PERINEURAL
Status: COMPLETED | OUTPATIENT
Start: 2020-07-28 | End: 2020-07-28

## 2020-07-29 RX ORDER — TRIAMCINOLONE ACETONIDE 40 MG/ML
40 INJECTION, SUSPENSION INTRA-ARTICULAR; INTRAMUSCULAR
Status: COMPLETED | OUTPATIENT
Start: 2020-07-28 | End: 2020-07-28

## 2021-03-10 ENCOUNTER — TRANSCRIBE ORDERS (OUTPATIENT)
Dept: ADMINISTRATIVE | Facility: HOSPITAL | Age: 66
End: 2021-03-10

## 2021-03-10 DIAGNOSIS — I73.9 CLAUDICATION (HCC): ICD-10-CM

## 2021-03-10 DIAGNOSIS — R22.32 LOCALIZED SWELLING, MASS AND LUMP, UPPER LIMB, LEFT: ICD-10-CM

## 2021-03-10 DIAGNOSIS — R23.0 CYANOSIS: ICD-10-CM

## 2021-03-10 DIAGNOSIS — R22.32 MASS OF UPPER EXTREMITY, LEFT: Primary | ICD-10-CM

## 2021-03-19 ENCOUNTER — HOSPITAL ENCOUNTER (OUTPATIENT)
Dept: ULTRASOUND IMAGING | Facility: HOSPITAL | Age: 66
Discharge: HOME | End: 2021-03-19

## 2021-03-19 ENCOUNTER — HOSPITAL ENCOUNTER (OUTPATIENT)
Dept: CARDIOLOGY | Facility: HOSPITAL | Age: 66
Discharge: HOME | End: 2021-03-19

## 2021-03-19 VITALS — BODY MASS INDEX: 28.42 KG/M2 | WEIGHT: 203 LBS | HEIGHT: 71 IN

## 2021-03-19 DIAGNOSIS — I73.9 CLAUDICATION (HCC): ICD-10-CM

## 2021-03-19 DIAGNOSIS — R23.0 CYANOSIS: ICD-10-CM

## 2021-03-19 DIAGNOSIS — R22.32 LOCALIZED SWELLING, MASS AND LUMP, UPPER LIMB, LEFT: ICD-10-CM

## 2021-03-19 LAB
BH CV LOWER ARTERIAL LEFT ABI RATIO: 1.27
BH CV LOWER ARTERIAL LEFT CALF RATIO: 1.24
BH CV LOWER ARTERIAL LEFT DORSALIS PEDIS SYS MAX: 140 MMHG
BH CV LOWER ARTERIAL LEFT HIGH THIGH SYS MAX: 154 MMHG
BH CV LOWER ARTERIAL LEFT LOW THIGH SYS MAX: 135 MMHG
BH CV LOWER ARTERIAL LEFT POPLITEAL SYS MAX: 136 MMHG
BH CV LOWER ARTERIAL LEFT POST TIBIAL SYS MAX: 131 MMHG
BH CV LOWER ARTERIAL LEFT TBI RATIO: 0.92
BH CV LOWER ARTERIAL RIGHT ABI RATIO: 1.17
BH CV LOWER ARTERIAL RIGHT CALF RATIO: 1.23
BH CV LOWER ARTERIAL RIGHT DORSALIS PEDIS SYS MAX: 129 MMHG
BH CV LOWER ARTERIAL RIGHT HIGH THIGH SYS MAX: 147 MMHG
BH CV LOWER ARTERIAL RIGHT LOW THIGH SYS MAX: 133 MMHG
BH CV LOWER ARTERIAL RIGHT POPLITEAL SYS MAX: 135 MMHG
BH CV LOWER ARTERIAL RIGHT POST TIBIAL SYS MAX: 128 MMHG
BH CV LOWER ARTERIAL RIGHT TBI RATIO: 1.13
UPPER ARTERIAL LEFT ARM BRACHIAL SYS MAX: 154 MMHG
UPPER ARTERIAL RIGHT ARM BRACHIAL SYS MAX: 110 MMHG

## 2021-03-19 PROCEDURE — 93923 UPR/LXTR ART STDY 3+ LVLS: CPT

## 2021-03-19 PROCEDURE — 93923 UPR/LXTR ART STDY 3+ LVLS: CPT | Performed by: INTERNAL MEDICINE

## 2021-03-19 PROCEDURE — 76882 US LMTD JT/FCL EVL NVASC XTR: CPT

## 2021-03-19 PROCEDURE — 93924 LWR XTR VASC STDY BILAT: CPT

## 2021-03-26 ENCOUNTER — TRANSCRIBE ORDERS (OUTPATIENT)
Dept: ADMINISTRATIVE | Facility: HOSPITAL | Age: 66
End: 2021-03-26

## 2021-03-26 DIAGNOSIS — I82.722 CHRONIC EMBOLISM AND THROMBOSIS OF DEEP VEIN OF LEFT UPPER EXTREMITY (HCC): ICD-10-CM

## 2021-03-26 DIAGNOSIS — H81.4 VERTIGO, CENTRAL: ICD-10-CM

## 2021-03-26 DIAGNOSIS — I82.0 THROMBOSIS, HEPATIC VEIN (HCC): Primary | ICD-10-CM

## 2021-04-04 ENCOUNTER — HOSPITAL ENCOUNTER (OUTPATIENT)
Dept: CT IMAGING | Facility: HOSPITAL | Age: 66
Discharge: HOME | End: 2021-04-04
Admitting: NURSE PRACTITIONER

## 2021-04-04 DIAGNOSIS — I82.0 THROMBOSIS, HEPATIC VEIN (HCC): ICD-10-CM

## 2021-04-04 DIAGNOSIS — H81.4 VERTIGO, CENTRAL: ICD-10-CM

## 2021-04-04 DIAGNOSIS — I82.722 CHRONIC EMBOLISM AND THROMBOSIS OF DEEP VEIN OF LEFT UPPER EXTREMITY (HCC): ICD-10-CM

## 2021-04-04 PROCEDURE — 73206 CT ANGIO UPR EXTRM W/O&W/DYE: CPT

## 2021-04-04 PROCEDURE — 70498 CT ANGIOGRAPHY NECK: CPT

## 2021-04-04 PROCEDURE — 0 IOPAMIDOL PER 1 ML: Performed by: NURSE PRACTITIONER

## 2021-04-04 RX ADMIN — IOPAMIDOL 95 ML: 755 INJECTION, SOLUTION INTRAVENOUS at 09:48

## 2021-04-06 ENCOUNTER — HOSPITAL ENCOUNTER (OUTPATIENT)
Dept: CARDIOLOGY | Facility: HOSPITAL | Age: 66
Discharge: HOME | End: 2021-04-06

## 2021-04-06 ENCOUNTER — OFFICE VISIT (OUTPATIENT)
Dept: CARDIOLOGY | Facility: HOSPITAL | Age: 66
End: 2021-04-06

## 2021-04-06 VITALS
BODY MASS INDEX: 28.99 KG/M2 | HEIGHT: 71 IN | WEIGHT: 207.06 LBS | HEART RATE: 69 BPM | SYSTOLIC BLOOD PRESSURE: 129 MMHG | RESPIRATION RATE: 20 BRPM | TEMPERATURE: 97.8 F | OXYGEN SATURATION: 96 % | DIASTOLIC BLOOD PRESSURE: 75 MMHG

## 2021-04-06 DIAGNOSIS — R20.2 NUMBNESS AND TINGLING IN LEFT ARM: Primary | ICD-10-CM

## 2021-04-06 DIAGNOSIS — J44.9 CHRONIC OBSTRUCTIVE PULMONARY DISEASE, UNSPECIFIED COPD TYPE (HCC): ICD-10-CM

## 2021-04-06 DIAGNOSIS — I10 ESSENTIAL HYPERTENSION: ICD-10-CM

## 2021-04-06 DIAGNOSIS — Z72.0 TOBACCO USE: ICD-10-CM

## 2021-04-06 DIAGNOSIS — R20.0 NUMBNESS AND TINGLING IN LEFT ARM: Primary | ICD-10-CM

## 2021-04-06 LAB
QT INTERVAL: 430 MS
QTC INTERVAL: 457 MS

## 2021-04-06 PROCEDURE — 99214 OFFICE O/P EST MOD 30 MIN: CPT | Performed by: NURSE PRACTITIONER

## 2021-04-06 PROCEDURE — 93010 ELECTROCARDIOGRAM REPORT: CPT | Performed by: INTERNAL MEDICINE

## 2021-04-06 PROCEDURE — 93005 ELECTROCARDIOGRAM TRACING: CPT | Performed by: NURSE PRACTITIONER

## 2021-04-12 ENCOUNTER — APPOINTMENT (OUTPATIENT)
Dept: CT IMAGING | Facility: HOSPITAL | Age: 66
End: 2021-04-12

## 2021-04-12 NOTE — PROGRESS NOTES
"Chief Complaint  Follow-up (thrombosis)    Subjective    History of Present Illness {CC  Problem List  Visit  Diagnosis   Encounters  Notes  Medications  Labs  Result Review Imaging  Media :23}       History of Present Illness   65-year-old male presents the office today at the request of his PCP for ongoing evaluation of numbness in his left arm.  Currently denies chest pain, palpitations, dyspnea, presyncope, syncope, orthopnea, PND or pedal edema.  Patient reports that he has been experiencing numbness in his left axilla and down his left arm.  It usually starts at night and will awaken him from sleep.Patient recently had an ultrasound of the left upper extremity that showed an elongated hyperechoic area within the muscle for confluent edema or potential hematoma and questionable thrombus in the adjacent vascular structure.  Patient had ABIs in bilateral lower extremities which show normal VICKY bilaterally slightly diminished waveform in the left great toe however TBI is normal at 0.92.  Patient then underwent a CT angiogram of his neck on 4/4/2021 that showed no evidence of high-grade arterial stenosis or arterial embolus from the level of the left subclavian to the antecubital segment of the artery.  No evidence of significant common, internal, or external carotid artery stenosis.  Patient recently started on Xarelto for potential clot.  Objective     Vital Signs:   Vitals:    04/06/21 1359   BP: 129/75   BP Location: Left arm   Patient Position: Sitting   Cuff Size: Adult   Pulse: 69   Resp: 20   Temp: 97.8 °F (36.6 °C)   TempSrc: Temporal   SpO2: 96%   Weight: 93.9 kg (207 lb 1 oz)   Height: 180.3 cm (71\")     Body mass index is 28.88 kg/m².  Physical Exam  Vitals and nursing note reviewed.   Constitutional:       Appearance: Normal appearance.   HENT:      Head: Normocephalic.   Eyes:      Pupils: Pupils are equal, round, and reactive to light.   Cardiovascular:      Rate and Rhythm: Normal rate and " regular rhythm.      Pulses: Normal pulses.      Heart sounds: Normal heart sounds. No murmur heard.     Pulmonary:      Effort: Pulmonary effort is normal.      Breath sounds: Normal breath sounds.   Abdominal:      General: Bowel sounds are normal.      Palpations: Abdomen is soft.   Musculoskeletal:         General: Normal range of motion.      Cervical back: Normal range of motion.      Right lower leg: No edema.      Left lower leg: No edema.   Skin:     General: Skin is warm and dry.      Capillary Refill: Capillary refill takes less than 2 seconds.   Neurological:      Mental Status: He is alert and oriented to person, place, and time.   Psychiatric:         Mood and Affect: Mood normal.         Thought Content: Thought content normal.              Result Review  Data Reviewed:{ Labs  Result Review  Imaging  Med Tab  Media :23}   ECG 12 Lead (04/06/2021 13:42)  Doppler Arterial Multi Level Lower Extremity - Bilateral CAR (03/19/2021 11:22)  US Nonvascular Extremity Limited (03/19/2021 08:41)  Stress Test With Myocardial Perfusion (1 Day) (03/20/2019 10:34)             Assessment and Plan {CC Problem List  Visit Diagnosis  ROS  Review (Popup)  Health Maintenance  Quality  BestPractice  Medications  SmartSets  SnapShot Encounters  Media :23}   1. Numbness and tingling in left arm    - Ambulatory Referral to Neurology    2. Essential hypertension  Well controlled on clonidine, Zestoretic   - ECG 12 Lead; Future    3. Chronic obstructive pulmonary disease, unspecified COPD type (CMS/HCC)  Stable    4. Tobacco use  It is very important that he quit smoking. There are various alternatives available to help with this difficult task, but first and foremost, he must make a firm commitment and decision to quit. The nature of nicotine addiction is discussed. The correct use, cost and side effects of nicotine replacement therapy such as gum or patches was discussed. Bupropion and its cost (sometimes not  covered fully by insurance) and side effects are reviewed.  I recommend he not allow potential costs of treatment to deter him from using nicotine replacement therapy or bupropion, as the long term economic and health benefits are obvious. Education time 1 minute      No evidence of clot noted on recent CT angiogram.  May stop Xarelto    Follow Up {Instructions Charge Capture  Follow-up Communications :23}   Return if symptoms worsen or fail to improve.    Patient was given instructions and counseling regarding his condition or for health maintenance advice. Please see specific information pulled into the AVS if appropriate.  Patient was instructed to call the [facility] with any questions, concerns, or worsening symptoms.    *Please note that portions of this note were completed with a voice recognition program. Efforts were made to edit the dictations, but occasionally words are mistranscribed.

## 2021-07-01 ENCOUNTER — OFFICE VISIT (OUTPATIENT)
Dept: NEUROLOGY | Facility: CLINIC | Age: 66
End: 2021-07-01

## 2021-07-01 VITALS
DIASTOLIC BLOOD PRESSURE: 70 MMHG | OXYGEN SATURATION: 95 % | HEIGHT: 71 IN | SYSTOLIC BLOOD PRESSURE: 118 MMHG | BODY MASS INDEX: 28.87 KG/M2 | HEART RATE: 68 BPM | WEIGHT: 206.2 LBS

## 2021-07-01 DIAGNOSIS — M54.12 CERVICAL RADICULOPATHY: Primary | ICD-10-CM

## 2021-07-01 PROCEDURE — 99204 OFFICE O/P NEW MOD 45 MIN: CPT | Performed by: PSYCHIATRY & NEUROLOGY

## 2021-07-01 RX ORDER — GABAPENTIN 300 MG/1
300 CAPSULE ORAL NIGHTLY
Qty: 30 CAPSULE | Refills: 2 | Status: SHIPPED | OUTPATIENT
Start: 2021-07-01

## 2021-07-01 NOTE — PROGRESS NOTES
Subjective:    CC: Juancarlso Espinal is seen today in consultation at the request of MIKI Kiran for Numbness       HPI:  65-year-old man with a history of hypertension, hyperlipidemia, COPD, anxiety, chronic smoking, arthritis, chronic back pain on opiates presents with left arm pain and numbness.  As per patient he started having pain in his left arm about 4 months ago.  After a while the pain stopped however he lifted something heavy and it started back again.  He also has pain in his neck specially while turning his head to the left which radiates down his left shoulder and axilla and also causes him to have pain and numbness in the area of the triceps.  He feels that his  has weakened and he is unable to hold his tools (works part-time as an ).  Denies any elbow or wrist pain but does have numbness in his fingers at times specially on waking up in the morning.  The pain in his arm does keep him awake at night especially if he sleeps on the left side therefore he has to sleep with his arm up or on his right side.  He had her MRI cervical spine last year that showed multilevel degenerative changes as well as minor disc bulges with moderate to severe neural foraminal stenosis on the right at C3-4 and C4-5 as well as significant stenosis on the left at C5-6.  He was never referred to neurosurgery and also did not undergo physical therapy.  Takes hydrocodone 3 times a day for arthritis/back pain.  He also had a CTA neck recently that showed mild left ICA atherosclerosis as well as a CT of the upper extremity that did not show any high-grade stenosis or embolus.  Of note-I personally reviewed his MRI cervical spine    The following portions of the patient's history were reviewed today and updated as of 07/01/2021  : allergies, current medications, past family history, past medical history, past social history, past surgical history and problem list  These document will be scanned to patient's  chart.      Current Outpatient Medications:   •  ANORO ELLIPTA 62.5-25 MCG/INH aerosol powder  inhaler, INHALE 1 PUFF BY MOUTH AND ITL ONCE DAILY, Disp: , Rfl:   •  aspirin 81 MG EC tablet, Take 1 tablet by mouth Daily., Disp: 30 tablet, Rfl: 0  •  atorvastatin (LIPITOR) 80 MG tablet, Take 1 tablet by mouth Daily., Disp: , Rfl: 0  •  ATROVENT HFA 17 MCG/ACT inhaler, inhale 1 puff by mouth twice a day, Disp: , Rfl: 0  •  clonazePAM (KlonoPIN) 1 MG tablet, Take 1 mg by mouth 2 (Two) Times a Day As Needed for Seizures., Disp: , Rfl:   •  CloNIDine (CATAPRES) 0.1 MG tablet, Take 1 tablet by mouth 3 (Three) Times a Day As Needed for High Blood Pressure (If your blood pressure is over 180)., Disp: 30 tablet, Rfl: 0  •  docusate sodium (COLACE) 250 MG capsule, Take 250 mg by mouth Daily., Disp: , Rfl:   •  folic acid (FOLVITE) 1 MG tablet, Take 1 tablet by mouth Daily., Disp: , Rfl: 0  •  HYDROcodone-acetaminophen (NORCO)  MG per tablet, Take 1 tablet by mouth 3 (Three) Times a Day As Needed for Pain., Disp: , Rfl: 0  •  lisinopril-hydrochlorothiazide (PRINZIDE,ZESTORETIC) 20-25 MG per tablet, Take 1 tablet by mouth Daily., Disp: , Rfl: 0  •  meclizine (ANTIVERT) 25 MG tablet, Take 1 tablet by mouth Every 6 (Six) Hours As Needed for dizziness., Disp: , Rfl: 0  •  meloxicam (MOBIC) 15 MG tablet, Take 15 mg by mouth Daily., Disp: , Rfl:   •  montelukast (SINGULAIR) 10 MG tablet, Take 1 tablet by mouth Daily., Disp: , Rfl: 0  •  gabapentin (NEURONTIN) 300 MG capsule, Take 1 capsule by mouth Every Night., Disp: 30 capsule, Rfl: 2   Past Medical History:   Diagnosis Date   • Anxiety    • Arthritis    • Chronic pain    • COPD (chronic obstructive pulmonary disease) (CMS/HCC)    • Hyperlipemia    • Hypertension       Past Surgical History:   Procedure Laterality Date   • STEROID INJECTION HIP Right       Family History   Problem Relation Age of Onset   • Alzheimer's disease Mother    • Diabetes Mother    • Valvular heart  "disease Sister    • No Known Problems Maternal Grandmother    • No Known Problems Maternal Grandfather    • No Known Problems Paternal Grandmother    • No Known Problems Paternal Grandfather       Social History     Socioeconomic History   • Marital status:      Spouse name: Not on file   • Number of children: Not on file   • Years of education: Not on file   • Highest education level: Not on file   Tobacco Use   • Smoking status: Current Every Day Smoker     Packs/day: 1.50     Years: 45.00     Pack years: 67.50     Types: Cigarettes   • Smokeless tobacco: Never Used   Substance and Sexual Activity   • Alcohol use: Yes     Comment: occasional   • Drug use: No   • Sexual activity: Defer     Review of Systems    Objective:    /70   Pulse 68   Ht 180.3 cm (70.98\")   Wt 93.5 kg (206 lb 3.2 oz)   SpO2 95%   BMI 28.77 kg/m²     Neurology Exam:    General apperance: NAD.     Mental status: Alert, awake and oriented to time place and person.    Recent and Remote memory: Intact.    Attention span and Concentration: Normal.     Language and Speech: Intact- No dysarthria.    Fluency, Naming , Repitition and Comprehension:  Intact    Cranial Nerves:   CN II: Visual fields are full. Intact. Fundi - Normal, No papillederma, Pupils - VAHE  CN III, IV and VI: Extraocular movements are intact. Normal saccades.   CN V: Facial sensation is intact.   CN VII: Muscles of facial expression reveal no asymmetry. Intact.   CN VIII: Hearing is intact. Whispered voice intact.   CN IX and X: Palate elevates symmetrically. Intact  CN XI: Shoulder shrug is intact.   CN XII: Tongue is midline without evidence of atrophy or fasciculation.     Ophthalmoscopic exam of optic disc-normal    Motor: Tinel sign was negative.  Phalen's sign produced numbness of all of his fingertips on the left    Right UE muscle strength 5/5. Normal tone.     Left UE muscle strength 5/5. Normal tone.      Right LE muscle strength5/5. Normal tone. "     Left LE muscle strength 5/5. Normal tone.      Sensory: Normal light touch, vibration and pinprick sensation bilaterally.  Mildly reduced to pinprick in the C5-6 distribution on the left    DTRs: 2+ bilaterally in upper and lower extremities.    Babinski: Negative bilaterally.    Co-ordination: Normal finger-to-nose, heel to shin B/L.    Rhomberg: Negative.    Gait: Normal.    Cardiovascular: Regular rate and rhythm without murmur, gallop or rub.    Assessment and Plan:  1. Cervical radiculopathy  Patient most likely has cervical radiculopathy although entrapment neuropathies such as median and ulnar neuropathy are not entirely ruled out  I will get an EMG/NCS  Will start him on low doses of gabapentin 300 mg at night and refer him for physical therapy  If the pain does not improve then I may repeat the MRI cervical spine    - EMG & Nerve Conduction Test; Future  - gabapentin (NEURONTIN) 300 MG capsule; Take 1 capsule by mouth Every Night.  Dispense: 30 capsule; Refill: 2  - Ambulatory Referral to Physical Therapy       Return in about 2 months (around 9/1/2021).     I spent over 45 minutes with the patient face to face out of which over 50% (30 minutes) was spent in management, instructions and education.     Kimberly Randall MD

## 2021-08-11 ENCOUNTER — APPOINTMENT (OUTPATIENT)
Dept: NEUROLOGY | Facility: HOSPITAL | Age: 66
End: 2021-08-11

## 2022-03-18 ENCOUNTER — OFFICE VISIT (OUTPATIENT)
Dept: ORTHOPEDIC SURGERY | Facility: CLINIC | Age: 67
End: 2022-03-18

## 2022-03-18 VITALS
SYSTOLIC BLOOD PRESSURE: 122 MMHG | DIASTOLIC BLOOD PRESSURE: 76 MMHG | HEIGHT: 71 IN | BODY MASS INDEX: 28.42 KG/M2 | WEIGHT: 203 LBS

## 2022-03-18 DIAGNOSIS — S43.431A SUPERIOR GLENOID LABRUM LESION OF RIGHT SHOULDER, INITIAL ENCOUNTER: ICD-10-CM

## 2022-03-18 DIAGNOSIS — S46.011A RUPTURE OF RIGHT SUPRASPINATUS TENDON, INITIAL ENCOUNTER: ICD-10-CM

## 2022-03-18 DIAGNOSIS — M25.511 RIGHT SHOULDER PAIN, UNSPECIFIED CHRONICITY: Primary | ICD-10-CM

## 2022-03-18 PROCEDURE — 99213 OFFICE O/P EST LOW 20 MIN: CPT | Performed by: ORTHOPAEDIC SURGERY

## 2022-03-18 PROCEDURE — 20610 DRAIN/INJ JOINT/BURSA W/O US: CPT | Performed by: ORTHOPAEDIC SURGERY

## 2022-03-18 RX ORDER — IBUPROFEN 600 MG/1
600 TABLET ORAL 3 TIMES DAILY
COMMUNITY
Start: 2022-01-12 | End: 2022-11-01

## 2022-03-18 RX ADMIN — TRIAMCINOLONE ACETONIDE 40 MG: 40 INJECTION, SUSPENSION INTRA-ARTICULAR; INTRAMUSCULAR at 11:36

## 2022-03-18 RX ADMIN — LIDOCAINE HYDROCHLORIDE 4 ML: 10 INJECTION, SOLUTION EPIDURAL; INFILTRATION; INTRACAUDAL; PERINEURAL at 11:36

## 2022-03-18 NOTE — PROGRESS NOTES
"      [facility] [place] [facility] Note    Subjective     CC: Pain of the Right Shoulder      HPI    Juancarlos Espinal Jr. is a 66 y.o. male who presents with new problem of: right shoulder pain.  Onset: atraumatic and gradual in nature. The issue has been ongoing for 1 month(s). Pain is a 8/10 on the pain scale. Pain is described as aching and throbbing. Associated symptoms include pain. The pain is worse with sitting and sleeping; lying down improve the pain. Previous treatments have included: nothing.    I have reviewed the following portions of the patient's history:History of Present Illness and review of systems.    He was last seen here in 2020.  He had a cortisone injection.  He did well with that.  I have reviewed the following portions of the patient's history:History of Present Illness HPI and review of systems.  He is hoping to get a steroid shot today.  He had a recent MRI a month ago in [place].  He did not bring them with him.  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  Does not think he can have surgery right now    Review of Systems   HENT: Positive for hearing loss.    Eyes: Positive for redness.   Musculoskeletal: Positive for arthralgias.   All other systems reviewed and are negative.      ROS:    Constiutional:Pt denies fever, chills, nausea, or vomiting.  MSK:as above      Objective      Past Medical History  Past Medical History:   Diagnosis Date   • Anxiety    • Arthritis    • Chronic pain    • COPD (chronic obstructive pulmonary disease) ([facility])    • Hyperlipemia    • Hypertension          Physical Exam  /76   Ht 180.3 cm (70.98\")   Wt 92.1 kg (203 lb)   BMI 28.33 kg/m²     Body mass index is 28.33 kg/m².    Patient is well nourished and well developed.        Ortho Exam  Near full right shoulder motion.  Positive impingement sign.  Rotator cuff strength 4-5.    Imaging/Labs/EMG Reviewed:  Imaging Results (Last 24 Hours)     Procedure Component Value Units Date/Time    XR " Shoulder 2+ View Right [[id number]] Resulted: 03/18/22 1132     Updated: 03/18/22 1132    Narrative:      Right Shoulder X-Ray  Indication: Pain  AP, scapular Y, and axillary lateral views    Findings:  No fracture  No bony lesion  Normal soft tissues  Normal joint spaces    No prior studies were available for comparison.            Assessment:  1. Right shoulder pain, unspecified chronicity    2. Rupture of right supraspinatus tendon, initial encounter    3. Superior glenoid labrum lesion of right shoulder, initial encounter        Plan:  1. Recommend over the counter anti-inflammatories for pain and/or swelling  2. I injected his right shoulder with cortisone.  He will follow-up in a month.  He will bring his MRI with him next visit so I can see the pictures.    Follow Up:   Return in about 1 month (around 4/18/2022).      Medical Decision Making  Management Options : Low - OTC Drugs        Vic Cerrato M.D., [facility]  Orthopedic Surgeon  Fellowship Trained Sports Medicine  [facility]  Orthopedics and Sports Medicine  [address] [place] [address], [address]  [place] [address]    EMR Dragon/Transcription disclaimer:  Much of this encounter note is an electronic transcription of spoken language to printed text. Electronic transcription of spoken language may permit erroneous, or at times, nonsensical words or phrases to be inadvertently transcribed. Although I have reviewed the note for such errors, some may still exist.

## 2022-03-18 NOTE — PROGRESS NOTES
Procedure   Large Joint Arthrocentesis  Date/Time: 3/18/2022 11:36 AM  Consent given by: patient  Site marked: site marked  Timeout: Immediately prior to procedure a time out was called to verify the correct patient, procedure, equipment, support staff and site/side marked as required   Supporting Documentation  Indications: pain   Procedure Details  Location: shoulder -   Preparation: Patient was prepped and draped in the usual sterile fashion  Needle size: 22 G  Approach: anterolateral  Medications administered: 40 mg triamcinolone acetonide 40 MG/ML; 4 mL lidocaine PF 1% 1 % (1cc  Marcaine .75% NDC 3866-4972-27  Lot  [id number]  4/1/23)  Patient tolerance: patient tolerated the procedure well with no immediate complications

## 2022-03-21 RX ORDER — TRIAMCINOLONE ACETONIDE 40 MG/ML
40 INJECTION, SUSPENSION INTRA-ARTICULAR; INTRAMUSCULAR
Status: COMPLETED | OUTPATIENT
Start: 2022-03-18 | End: 2022-03-18

## 2022-03-21 RX ORDER — LIDOCAINE HYDROCHLORIDE 10 MG/ML
4 INJECTION, SOLUTION EPIDURAL; INFILTRATION; INTRACAUDAL; PERINEURAL
Status: COMPLETED | OUTPATIENT
Start: 2022-03-18 | End: 2022-03-18

## 2022-04-25 ENCOUNTER — OFFICE VISIT (OUTPATIENT)
Dept: ORTHOPEDIC SURGERY | Facility: CLINIC | Age: 67
End: 2022-04-25

## 2022-04-25 VITALS
SYSTOLIC BLOOD PRESSURE: 130 MMHG | DIASTOLIC BLOOD PRESSURE: 74 MMHG | WEIGHT: 201 LBS | BODY MASS INDEX: 28.14 KG/M2 | HEIGHT: 71 IN

## 2022-04-25 DIAGNOSIS — M75.111 NONTRAUMATIC INCOMPLETE TEAR OF RIGHT ROTATOR CUFF: ICD-10-CM

## 2022-04-25 DIAGNOSIS — M25.511 RIGHT SHOULDER PAIN, UNSPECIFIED CHRONICITY: Primary | ICD-10-CM

## 2022-04-25 DIAGNOSIS — S43.431D SUPERIOR GLENOID LABRUM LESION OF RIGHT SHOULDER, SUBSEQUENT ENCOUNTER: ICD-10-CM

## 2022-04-25 PROCEDURE — 99213 OFFICE O/P EST LOW 20 MIN: CPT | Performed by: ORTHOPAEDIC SURGERY

## 2022-04-25 NOTE — PROGRESS NOTES
"      [facility] [place] [facility] [place]    Subjective     CC: Follow-up (1 month f/u--Right shoulder pain)      HPI    Juancarlos Espinal Jr. is a 66 y.o. male.  He is doing much better after cortisone injection on March 18.  No new complaints.    Review of Systems   Constitutional: Negative.  Negative for chills, fatigue and fever.   HENT: Negative.  Negative for congestion and dental problem.    Eyes: Negative.  Negative for blurred vision.   Respiratory: Negative.  Negative for shortness of breath.    Cardiovascular: Negative.  Negative for leg swelling.   Gastrointestinal: Negative.  Negative for abdominal pain.   Endocrine: Negative.  Negative for polyuria.   Genitourinary: Negative.  Negative for difficulty urinating.   Musculoskeletal: Positive for arthralgias.   Skin: Negative.    Allergic/Immunologic: Negative.    Neurological: Negative.    Hematological: Negative.  Negative for adenopathy.   Psychiatric/Behavioral: Negative.  Negative for behavioral problems.       ROS:    Constiutional:Pt denies fever, chills, nausea, or vomiting.  MSK:as above      Objective      Past Medical History  Past Medical History:   Diagnosis Date   • Anxiety    • Arthritis    • Chronic pain    • COPD (chronic obstructive pulmonary disease) (HCC)    • Hyperlipemia    • Hypertension          Physical Exam  /74   Ht 180.3 cm (70.98\")   Wt 91.2 kg (201 lb)   BMI 28.05 kg/m²     Body mass index is 28.05 kg/m².    Patient is well nourished and well developed.        Ortho Exam  Right shoulder with full motion full-strength.    Imaging/Labs/EMG Reviewed:  Imaging Results (Last 24 Hours)     ** No results found for the last 24 hours. **      I viewed and personally interpreted his MRI from March 3 as a partial supraspinatus tear greater than 50%  No valid procedures specified.     Assessment:  1. Right shoulder pain, unspecified chronicity    2. Nontraumatic incomplete tear of right rotator cuff    3. Superior glenoid labrum " lesion of right shoulder, subsequent encounter        Plan:  1. Recommend over the counter anti-inflammatories for pain and/or swelling  2. He will continue his anti-inflammatories.  He will follow-up as needed.    Follow Up:   Return if symptoms worsen or fail to improve.      Medical Decision Making  Management Options : Low - OTC Drugs        Vic Cerrato M.D., [facility]  Orthopedic Surgeon  Fellowship Trained Sports Medicine  [facility]  Orthopedics and Sports Medicine  [facility], [address]  [place] [address]    EMR Dragon/Transcription disclaimer:  Much of this encounter note is an electronic transcription of spoken language to printed text. Electronic transcription of spoken language may permit erroneous, or at times, nonsensical words or phrases to be inadvertently transcribed. Although I have reviewed the note for such errors, some may still exist.

## 2022-11-01 ENCOUNTER — OFFICE VISIT (OUTPATIENT)
Dept: CARDIOLOGY | Facility: CLINIC | Age: 67
End: 2022-11-01

## 2022-11-01 VITALS
HEART RATE: 83 BPM | BODY MASS INDEX: 27.94 KG/M2 | DIASTOLIC BLOOD PRESSURE: 82 MMHG | HEIGHT: 71 IN | WEIGHT: 199.6 LBS | SYSTOLIC BLOOD PRESSURE: 130 MMHG | OXYGEN SATURATION: 96 %

## 2022-11-01 DIAGNOSIS — R42 DIZZINESS: ICD-10-CM

## 2022-11-01 DIAGNOSIS — E78.00 PURE HYPERCHOLESTEROLEMIA: Primary | ICD-10-CM

## 2022-11-01 DIAGNOSIS — R94.31 ABNORMAL ELECTROCARDIOGRAM (ECG) (EKG): ICD-10-CM

## 2022-11-01 DIAGNOSIS — Z72.0 TOBACCO USE: ICD-10-CM

## 2022-11-01 DIAGNOSIS — I10 PRIMARY HYPERTENSION: ICD-10-CM

## 2022-11-01 PROBLEM — I49.3 PVC (PREMATURE VENTRICULAR CONTRACTION): Status: ACTIVE | Noted: 2022-11-01

## 2022-11-01 PROCEDURE — 93000 ELECTROCARDIOGRAM COMPLETE: CPT | Performed by: INTERNAL MEDICINE

## 2022-11-01 PROCEDURE — 99214 OFFICE O/P EST MOD 30 MIN: CPT | Performed by: INTERNAL MEDICINE

## 2022-11-01 NOTE — PROGRESS NOTES
New Patient Office Visit      Date: 2022  Patient Name: Juancarlos Espinal Jr.  : 1955   MRN: [id number]     Chief Complaint:    Chief Complaint   Patient presents with   • Fatigue   • ZAPATA   • Chest Pain   • Dizziness       History of Present Illness: Juancarlos Espinal Jr. is a 67 y.o. male who is here today for evaluation of symptoms he had it about 3 weeks ago.  He woke up in the morning and could not move out of the bed.  He took him some time to get out of the bed and the room was spinning.  Sometimes he feels like his heart is sinking or racing fast.  The feeling lasted about 2 days and then went away.  He went to see his primary care physician who has referred him for cardiac evaluation.  He notes seeing like that his heart has been going fast.      Problem List        CARDIAC:  a.  Coronary artery disease:  1.  Chest pain, stress test, 3/20/2019: Normal     b.  Myocardium:  1.  Echo, 2016: LVEF 56%, NATALIE     c.  Valvular:  1.  No valvular disease    d.  Electrical:  1.  PVCs    e.  Percardium:  1.  Normal pericardium      VASCULAR:  A. Arterial:    b. Peripheral vascular disease:  1.  Claudication, ABIs, 3/19/2021: Normal                       CARDIAC RISK FACTORS:         1.  68-pack-year smoking          2.  Hypertension          3.  Hyperlipidemia    NON CARDIAC:  1.  COPD  2.  Arthritis  3.  Anxiety  4.  Chronic pain    SURGERIES:  1.  None               Subjective      Review of Systems:   Review of Systems   Neurological: Positive for dizziness and weakness.       Medications:     Current Outpatient Medications:   •  aspirin 81 MG EC tablet, Take 1 tablet by mouth Daily., Disp: 30 tablet, Rfl: 0  •  atorvastatin (LIPITOR) 80 MG tablet, Take 1 tablet by mouth Daily., Disp: , Rfl: 0  •  clonazePAM (KlonoPIN) 1 MG tablet, Take 1 mg by mouth 2 (Two) Times a Day As Needed for Seizures., Disp: , Rfl:   •  CloNIDine (CATAPRES) 0.1 MG tablet, Take 1 tablet by mouth 3 (Three) Times a Day As  "Needed for High Blood Pressure (If your blood pressure is over 180)., Disp: 30 tablet, Rfl: 0  •  gabapentin (NEURONTIN) 300 MG capsule, Take 1 capsule by mouth Every Night., Disp: 30 capsule, Rfl: 2  •  HYDROcodone-acetaminophen (NORCO)  MG per tablet, Take 1 tablet by mouth 3 (Three) Times a Day As Needed for Pain., Disp: , Rfl: 0  •  lisinopril-hydrochlorothiazide (PRINZIDE,ZESTORETIC) 20-25 MG per tablet, Take 1 tablet by mouth Daily., Disp: , Rfl: 0  •  meloxicam (MOBIC) 15 MG tablet, Take 15 mg by mouth Daily., Disp: , Rfl:   •  montelukast (SINGULAIR) 10 MG tablet, Take 1 tablet by mouth Daily., Disp: , Rfl: 0    Allergies:   No Known Allergies    Objective     Physical Exam:  Vital Signs:   Vitals:    11/01/22 1312   BP: 130/82   BP Location: Right arm   Patient Position: Sitting   Pulse: 83   SpO2: 96%   Weight: 90.5 kg (199 lb 9.6 oz)   Height: 180.3 cm (71\")     Body mass index is 27.84 kg/m².   Constitutional:       General: Not in acute distress.     Appearance: Healthy appearance. Not in distress.   Pulmonary:      Effort: Pulmonary effort is normal.      Breath sounds: Normal breath sounds. No wheezing. No rhonchi. No rales.   Cardiovascular:      Normal rate. Regular rhythm. Normal S1. Normal S2.      Murmurs: There is no murmur.      No gallop. No click. No rub.   Abdominal:      General: Bowel sounds are normal.      Palpations: Abdomen is soft.      Tenderness: There is no abdominal tenderness.  Extremities:     Pulses     Intact distal pulses.     No Edema    Labs:  Lab Results   Component Value Date    GLUCOSE 102 (H) 02/24/2019    BUN 17 02/24/2019    CREATININE 0.98 02/24/2019    EGFRIFNONA 77 02/24/2019    BCR 17.3 02/24/2019    K 3.9 02/24/2019    CO2 27.0 02/24/2019    CALCIUM 9.3 02/24/2019    ALBUMIN 4.39 02/24/2019    AST 28 02/24/2019    ALT 43 (H) 02/24/2019     Lab Results   Component Value Date    WBC 9.51 02/24/2019    HGB 17.5 02/24/2019    HCT 51.1 (H) 02/24/2019    MCV 94.3 " 02/24/2019     02/24/2019     No results found for: CHOL, CHLPL, TRIG, HDL, LDL, LDLDIRECT  No results found for: TSH  No results found for: HGBA1C          ECG 12 Lead    Date/Time: 11/1/2022 1:22 PM  Performed by: Ronald Taylor MD  Authorized by: Ronald Taylor MD   Comparison: compared with previous ECG from 4/6/2021  Comparison to previous ECG: PVCs are present  Rhythm: sinus rhythm  Ectopy: multifocal PVCs  Other findings: left ventricular hypertrophy          Smoking Cessation:   less than 3 minutes spent counseling Will try to cut down    Assessment / Plan      Assessment:   Diagnosis Plan   1. Pure hypercholesterolemia        2. Primary hypertension  Adult Transthoracic Echo Complete W/ Cont if Necessary Per Protocol    Stress Test With Myocardial Perfusion One Day      3. Tobacco use        4. Dizziness  ECG 12 Lead    Adult Transthoracic Echo Complete W/ Cont if Necessary Per Protocol      5. PVC (premature ventricular contraction)  ECG 12 Lead      6. Abnormal electrocardiogram (ECG) (EKG)  Adult Transthoracic Echo Complete W/ Cont if Necessary Per Protocol    Stress Test With Myocardial Perfusion One Day           Plan:    1.  Patient has multiple risk factors and now starting having trigeminy and quadrigeminy on EKG.  We will go ahead and schedule him for a stress test to rule out any coronary artery disease involvement.    2.  I have a long discussion with him about quit smoking and he is going give it a shot.    3.  His blood pressure is under good control.  We will get echocardiogram to rule out any hypertensive heart disease and then we will adjust medications accordingly.          Follow Up:   Return in about 3 months (around 2/1/2023).    Ronald Taylor MD

## 2022-12-09 ENCOUNTER — HOSPITAL ENCOUNTER (OUTPATIENT)
Dept: CARDIOLOGY | Facility: HOSPITAL | Age: 67
End: 2022-12-09

## 2022-12-09 ENCOUNTER — APPOINTMENT (OUTPATIENT)
Dept: CARDIOLOGY | Facility: HOSPITAL | Age: 67
End: 2022-12-09

## 2023-01-10 ENCOUNTER — HOSPITAL ENCOUNTER (OUTPATIENT)
Dept: CARDIOLOGY | Facility: HOSPITAL | Age: 68
Discharge: HOME | End: 2023-01-10
Payer: MEDICARE

## 2023-01-10 DIAGNOSIS — I10 PRIMARY HYPERTENSION: ICD-10-CM

## 2023-01-10 DIAGNOSIS — R94.31 ABNORMAL ELECTROCARDIOGRAM (ECG) (EKG): ICD-10-CM

## 2023-02-06 LAB
BH CV REST NUCLEAR ISOTOPE DOSE: 11.3 MCI
BH CV STRESS BP STAGE 1: NORMAL
BH CV STRESS COMMENTS STAGE 1: NORMAL
BH CV STRESS DOSE REGADENOSON STAGE 1: 0.4
BH CV STRESS DURATION MIN STAGE 1: 1
BH CV STRESS DURATION SEC STAGE 1: 5
BH CV STRESS HR STAGE 1: 72
BH CV STRESS NUCLEAR ISOTOPE DOSE: 32 MCI
BH CV STRESS O2 STAGE 1: 100
BH CV STRESS PROTOCOL 1: NORMAL
BH CV STRESS RECOVERY BP: NORMAL MMHG
BH CV STRESS RECOVERY HR: 78 BPM
BH CV STRESS STAGE 1: 1
LV EF NUC BP: 61 %
MAXIMAL PREDICTED HEART RATE: 153 BPM
PERCENT MAX PREDICTED HR: 47.06 %
STRESS BASELINE BP: NORMAL MMHG
STRESS BASELINE HR: 67 BPM
STRESS PERCENT HR: 55 %
STRESS POST O2 SAT PEAK: 100 %
STRESS POST PEAK BP: NORMAL MMHG
STRESS POST PEAK HR: 72 BPM
STRESS TARGET HR: 130 BPM

## 2023-02-13 ENCOUNTER — TELEPHONE (OUTPATIENT)
Dept: CARDIOLOGY | Facility: CLINIC | Age: 68
End: 2023-02-13
Payer: MEDICARE

## 2023-02-13 NOTE — TELEPHONE ENCOUNTER
----- Message from Ronald Taylor MD sent at 2/10/2023  4:47 PM EST -----  His stress test is normal.

## 2023-02-17 ENCOUNTER — HOSPITAL ENCOUNTER (OUTPATIENT)
Dept: CARDIOLOGY | Facility: HOSPITAL | Age: 68
Discharge: HOME | End: 2023-02-17
Admitting: INTERNAL MEDICINE
Payer: MEDICARE

## 2023-02-17 VITALS — WEIGHT: 198.41 LBS | BODY MASS INDEX: 27.78 KG/M2 | HEIGHT: 71 IN

## 2023-02-17 DIAGNOSIS — I10 PRIMARY HYPERTENSION: ICD-10-CM

## 2023-02-17 DIAGNOSIS — R42 DIZZINESS: ICD-10-CM

## 2023-02-17 DIAGNOSIS — R94.31 ABNORMAL ELECTROCARDIOGRAM (ECG) (EKG): ICD-10-CM

## 2023-02-17 PROCEDURE — 93306 TTE W/DOPPLER COMPLETE: CPT | Performed by: INTERNAL MEDICINE

## 2023-02-17 PROCEDURE — 93306 TTE W/DOPPLER COMPLETE: CPT

## 2023-02-23 LAB
ASCENDING AORTA: 3.2 CM
BH CV ECHO MEAS - AO MAX PG: 5 MMHG
BH CV ECHO MEAS - AO MEAN PG: 3 MMHG
BH CV ECHO MEAS - AO ROOT DIAM: 3.3 CM
BH CV ECHO MEAS - AO V2 MAX: 112 CM/SEC
BH CV ECHO MEAS - AO V2 VTI: 25.7 CM
BH CV ECHO MEAS - AVA(I,D): 3.9 CM2
BH CV ECHO MEAS - EDV(CUBED): 106.5 ML
BH CV ECHO MEAS - EDV(MOD-SP2): 53 ML
BH CV ECHO MEAS - EDV(MOD-SP4): 44 ML
BH CV ECHO MEAS - EF(MOD-BP): 56 %
BH CV ECHO MEAS - EF(MOD-SP2): 52.8 %
BH CV ECHO MEAS - EF(MOD-SP4): 56.8 %
BH CV ECHO MEAS - ESV(CUBED): 33.7 ML
BH CV ECHO MEAS - ESV(MOD-SP2): 25 ML
BH CV ECHO MEAS - ESV(MOD-SP4): 19 ML
BH CV ECHO MEAS - FS: 31.9 %
BH CV ECHO MEAS - IVS/LVPW: 1.04 CM
BH CV ECHO MEAS - IVSD: 1.07 CM
BH CV ECHO MEAS - LA DIMENSION: 4.2 CM
BH CV ECHO MEAS - LAT PEAK E' VEL: 9.4 CM/SEC
BH CV ECHO MEAS - LV DIASTOLIC VOL/BSA (35-75): 21.1 CM2
BH CV ECHO MEAS - LV MASS(C)D: 178.2 GRAMS
BH CV ECHO MEAS - LV MAX PG: 4.5 MMHG
BH CV ECHO MEAS - LV MEAN PG: 2 MMHG
BH CV ECHO MEAS - LV SYSTOLIC VOL/BSA (12-30): 9.1 CM2
BH CV ECHO MEAS - LV V1 MAX: 106 CM/SEC
BH CV ECHO MEAS - LV V1 VTI: 24.2 CM
BH CV ECHO MEAS - LVIDD: 4.7 CM
BH CV ECHO MEAS - LVIDS: 3.2 CM
BH CV ECHO MEAS - LVOT AREA: 4.2 CM2
BH CV ECHO MEAS - LVOT DIAM: 2.3 CM
BH CV ECHO MEAS - LVPWD: 1.03 CM
BH CV ECHO MEAS - MED PEAK E' VEL: 7.5 CM/SEC
BH CV ECHO MEAS - MV A MAX VEL: 71.1 CM/SEC
BH CV ECHO MEAS - MV DEC SLOPE: 308 CM/SEC2
BH CV ECHO MEAS - MV DEC TIME: 0.19 MSEC
BH CV ECHO MEAS - MV E MAX VEL: 77 CM/SEC
BH CV ECHO MEAS - MV E/A: 1.08
BH CV ECHO MEAS - MV P1/2T: 90.3 MSEC
BH CV ECHO MEAS - MVA(P1/2T): 2.44 CM2
BH CV ECHO MEAS - PA ACC SLOPE: 587 CM/SEC2
BH CV ECHO MEAS - PA ACC TIME: 0.13 SEC
BH CV ECHO MEAS - PA PR(ACCEL): 20.9 MMHG
BH CV ECHO MEAS - SI(MOD-SP2): 13.4 ML/M2
BH CV ECHO MEAS - SI(MOD-SP4): 12 ML/M2
BH CV ECHO MEAS - SV(LVOT): 100.5 ML
BH CV ECHO MEAS - SV(MOD-SP2): 28 ML
BH CV ECHO MEAS - SV(MOD-SP4): 25 ML
BH CV ECHO MEAS - TAPSE (>1.6): 2.3 CM
BH CV ECHO MEASUREMENTS AVERAGE E/E' RATIO: 9.11
BH CV VAS BP RIGHT ARM: NORMAL MMHG
BH CV XLRA - RV BASE: 3.8 CM
BH CV XLRA - RV LENGTH: 5 CM
BH CV XLRA - RV MID: 3 CM
BH CV XLRA - TDI S': 13 CM/SEC
LEFT ATRIUM VOLUME INDEX: 22.6 ML/M2
MAXIMAL PREDICTED HEART RATE: 153 BPM
STRESS TARGET HR: 130 BPM

## 2023-03-02 ENCOUNTER — TELEPHONE (OUTPATIENT)
Dept: CARDIOLOGY | Facility: CLINIC | Age: 68
End: 2023-03-02
Payer: MEDICARE

## 2023-03-02 NOTE — TELEPHONE ENCOUNTER
Pt notified of results and verbalized understanding.     Pt reports his left leg turns purple after a few minutes of sitting. Pt is due for a FU with MA.     Notified Theodora  to put him on in [place] 3/7 at 131

## 2023-03-02 NOTE — TELEPHONE ENCOUNTER
----- Message from Ronald Taylor MD sent at 3/1/2023  5:23 PM EST -----  Echo showed normal heart function

## 2023-03-06 NOTE — PROGRESS NOTES
Follow-up Visit      Date: 2023  Patient Name: Juancarlos Espinal Jr.  : 1955   MRN: [id number]     Chief Complaint:    Chief Complaint   Patient presents with   • Pure hypercholesterolemia       History of Present Illness: Juancarlos Espinal Jr. is a 67 y.o. male who is here today for follow-up on his testing.  He has been doing fine.  His dizziness and weakness has resolved.  He sometimes feels like his leg become purple when he sits it gets better when he walks.  He has VICKY done recently that was normal.  He denies any chest pain any dizziness any palpitations or any other symptoms.      Problem List       CARDIAC:  a.  Coronary artery disease:  1.  Chest pain, stress test, 3/20/2019: Normal  2.  Stress test 2022: Normal     b.  Myocardium:  1.  Echo, 2016: LVEF 56%, NATALIE  2.  Echo, 2022: Normal     c.  Valvular:  1.  No valvular disease    d.  Electrical:  1.  PVCs    e.  Percardium:  1.  Normal pericardium      VASCULAR:  A. Arterial:    b. Peripheral vascular disease:  1.  Claudication, ABIs, 3/19/2021: Normal                       CARDIAC RISK FACTORS:         1.  68-pack-year smoking          2.  Hypertension          3.  Hyperlipidemia    NON CARDIAC:  1.  COPD  2.  Arthritis  3.  Anxiety  4.  Chronic pain    SURGERIES:  1.  None    2022 A1c-5.8  Creat 1.01      Subjective      Review of Systems:   Review of Systems   Skin: Positive for color change.       Medications:     Current Outpatient Medications:   •  aspirin 81 MG EC tablet, Take 1 tablet by mouth Daily., Disp: 30 tablet, Rfl: 0  •  atorvastatin (LIPITOR) 80 MG tablet, Take 1 tablet by mouth Daily., Disp: , Rfl: 0  •  clonazePAM (KlonoPIN) 1 MG tablet, Take 1 tablet by mouth 2 (Two) Times a Day As Needed for Seizures., Disp: , Rfl:   •  CloNIDine (CATAPRES) 0.1 MG tablet, Take 1 tablet by mouth 3 (Three) Times a Day As Needed for High Blood Pressure (If your blood pressure is over 180)., Disp: 30 tablet, Rfl: 0  •   "gabapentin (NEURONTIN) 300 MG capsule, Take 1 capsule by mouth Every Night., Disp: 30 capsule, Rfl: 2  •  HYDROcodone-acetaminophen (NORCO)  MG per tablet, Take 1 tablet by mouth 3 (Three) Times a Day As Needed for Pain., Disp: , Rfl: 0  •  lisinopril-hydrochlorothiazide (PRINZIDE,ZESTORETIC) 20-25 MG per tablet, Take 1 tablet by mouth Daily., Disp: , Rfl: 0  •  meloxicam (MOBIC) 15 MG tablet, Take 1 tablet by mouth Daily., Disp: , Rfl:   •  montelukast (SINGULAIR) 10 MG tablet, Take 1 tablet by mouth Daily., Disp: , Rfl: 0    Allergies:   No Known Allergies    Objective     Physical Exam:  Vitals:    03/07/23 1304   BP: 110/70   BP Location: Right arm   Patient Position: Sitting   Pulse: 72   SpO2: 97%   Weight: 91 kg (200 lb 9.6 oz)   Height: 180.3 cm (71\")     Body mass index is 27.98 kg/m².      Constitutional:       General: Not in acute distress.     Appearance: Healthy appearance. Not in distress.     Neck:     JVP: Not elevated     Carotid artery: No carotid bruit    Pulmonary:      Effort: Pulmonary effort is normal.      Breath sounds: Normal breath sounds. No wheezing. No rhonchi. No rales.     Cardiovascular:      Normal rate. Regular rhythm. Normal S1. Normal S2.      Murmurs: There is no murmur.      No gallop. No click. No rub.     Abdominal:      General: Bowel sounds are normal.      Palpations: Abdomen is soft.      Tenderness: There is no abdominal tenderness.    Extremities:     Pulses: Good pulses     Edema: No edema    Smoking Cessation:   less than 3 minutes spent counseling Not agreeable to stopping    Lab Review:   Lab Results   Component Value Date    GLUCOSE 102 (H) 02/24/2019    BUN 17 02/24/2019    CREATININE 0.98 02/24/2019    EGFRIFNONA 77 02/24/2019    BCR 17.3 02/24/2019    K 3.9 02/24/2019    CO2 27.0 02/24/2019    CALCIUM 9.3 02/24/2019    ALBUMIN 4.39 02/24/2019    AST 28 02/24/2019    ALT 43 (H) 02/24/2019     Lab Results   Component Value Date    WBC 9.51 02/24/2019    HGB " 17.5 02/24/2019    HCT 51.1 (H) 02/24/2019    MCV 94.3 02/24/2019     02/24/2019         Assessment / Plan      Assessment:   Diagnosis Plan   1. Pure hypercholesterolemia        2. Tobacco use        3. Arthritis             Plan:  1.  Patient stress test and echocardiogram did not show any significant disease.  I do not believe his skin discoloration has anything to do with the circulation.  He has normal ABIs recently.  I believe it is more of a venous congestion.  I have advised him not to sit for long period of time.    2.  He needs to quit smoking.    3.  He will continue with his basic work-up with his primary care doctor.      Follow Up:       Return in about 1 year (around 3/7/2024).    Ronald Taylor MD

## 2023-03-07 ENCOUNTER — OFFICE VISIT (OUTPATIENT)
Dept: CARDIOLOGY | Facility: CLINIC | Age: 68
End: 2023-03-07
Payer: MEDICARE

## 2023-03-07 VITALS
OXYGEN SATURATION: 97 % | HEART RATE: 72 BPM | DIASTOLIC BLOOD PRESSURE: 70 MMHG | BODY MASS INDEX: 28.08 KG/M2 | SYSTOLIC BLOOD PRESSURE: 110 MMHG | HEIGHT: 71 IN | WEIGHT: 200.6 LBS

## 2023-03-07 DIAGNOSIS — E78.00 PURE HYPERCHOLESTEROLEMIA: Primary | ICD-10-CM

## 2023-03-07 DIAGNOSIS — M19.90 ARTHRITIS: ICD-10-CM

## 2023-03-07 DIAGNOSIS — Z72.0 TOBACCO USE: ICD-10-CM

## 2023-03-07 PROCEDURE — 99213 OFFICE O/P EST LOW 20 MIN: CPT | Performed by: INTERNAL MEDICINE

## 2023-03-08 RX ORDER — ATORVASTATIN CALCIUM 80 MG/1
80 TABLET, FILM COATED ORAL DAILY
Qty: 90 TABLET | Refills: 0 | Status: SHIPPED | OUTPATIENT
Start: 2023-03-08

## 2024-02-27 ENCOUNTER — OFFICE VISIT (OUTPATIENT)
Dept: PULMONOLOGY | Facility: CLINIC | Age: 69
End: 2024-02-27
Payer: MEDICARE

## 2024-02-27 ENCOUNTER — PREP FOR SURGERY (OUTPATIENT)
Dept: OTHER | Facility: HOSPITAL | Age: 69
End: 2024-02-27
Payer: MEDICARE

## 2024-02-27 ENCOUNTER — PATIENT OUTREACH (OUTPATIENT)
Dept: ONCOLOGY | Facility: CLINIC | Age: 69
End: 2024-02-27
Payer: MEDICARE

## 2024-02-27 VITALS
HEART RATE: 68 BPM | WEIGHT: 202.2 LBS | HEIGHT: 71 IN | DIASTOLIC BLOOD PRESSURE: 70 MMHG | OXYGEN SATURATION: 95 % | BODY MASS INDEX: 28.31 KG/M2 | TEMPERATURE: 97.9 F | SYSTOLIC BLOOD PRESSURE: 130 MMHG

## 2024-02-27 DIAGNOSIS — R91.1 LUNG NODULE: Primary | ICD-10-CM

## 2024-02-27 DIAGNOSIS — R91.1 INCIDENTAL LUNG NODULE, GREATER THAN OR EQUAL TO 8MM: Primary | ICD-10-CM

## 2024-02-27 DIAGNOSIS — Z72.0 TOBACCO USE: ICD-10-CM

## 2024-02-27 RX ORDER — LIDOCAINE HYDROCHLORIDE 40 MG/ML
4 INJECTION, SOLUTION RETROBULBAR; TOPICAL ONCE
OUTPATIENT
Start: 2024-02-27 | End: 2024-02-27

## 2024-02-27 NOTE — H&P (VIEW-ONLY)
Initial Pulmonary Consult Note    Subjective   Reason for consultation/Chief Complaint: Lung Nodule    Juancarlos Espinal Jr. is a 68 y.o. male is being seen for consultation today at the request of Francine Spence,*    History of Present Illness    Mr. Espinal is a 67yo M current smoker who is referred for a lung nodule. Low dose CT chest from 10/10/23 showed a 12 x 7mm nodule in the left upper lobe. A PET scan from 2/16/24 showed a 14mm left upper lobe spiculated solid nodule with mild FDG uptake with a Max SUV of 2.9 and a moderately hypermetabolic left hilar lymph node with a max SUV of 3.9.     Active Ambulatory Problems     Diagnosis Date Noted    Hypertension 09/14/2017    Tobacco use 09/14/2017    Hyperlipidemia 09/14/2017    COPD (chronic obstructive pulmonary disease) 02/27/2019    Chronic pain 02/27/2019    Arthritis 02/27/2019    Anxiety 02/27/2019    Dizziness 11/01/2022    PVC (premature ventricular contraction) 11/01/2022    Abnormal electrocardiogram (ECG) (EKG) 11/01/2022    Incidental lung nodule, greater than or equal to 8mm 02/27/2024     Resolved Ambulatory Problems     Diagnosis Date Noted    Chest pain, unspecified 09/14/2017     Past Medical History:   Diagnosis Date    Hyperlipemia        Past Surgical History:   Procedure Laterality Date    STEROID INJECTION HIP Right        Family History   Problem Relation Age of Onset    Alzheimer's disease Mother     Diabetes Mother     Valvular heart disease Sister     No Known Problems Maternal Grandmother     No Known Problems Maternal Grandfather     No Known Problems Paternal Grandmother     No Known Problems Paternal Grandfather        Social History     Socioeconomic History    Marital status:    Tobacco Use    Smoking status: Every Day     Packs/day: 1.50     Years: 45.00     Additional pack years: 0.00     Total pack years: 67.50     Types: Cigarettes     Passive exposure: Current    Smokeless tobacco: Never   Vaping Use    Vaping  "Use: Never used   Substance and Sexual Activity    Alcohol use: Yes     Comment: occasional    Drug use: No    Sexual activity: Defer       No Known Allergies    Current Outpatient Medications   Medication Sig Dispense Refill    aspirin 81 MG EC tablet Take 1 tablet by mouth Daily. 30 tablet 0    atorvastatin (LIPITOR) 80 MG tablet Take 1 tablet by mouth Daily. 90 tablet 0    clonazePAM (KlonoPIN) 1 MG tablet Take 1 tablet by mouth 2 (Two) Times a Day As Needed for Seizures.      CloNIDine (CATAPRES) 0.1 MG tablet Take 1 tablet by mouth 3 (Three) Times a Day As Needed for High Blood Pressure (If your blood pressure is over 180). 30 tablet 0    gabapentin (NEURONTIN) 300 MG capsule Take 1 capsule by mouth Every Night. 30 capsule 2    HYDROcodone-acetaminophen (NORCO)  MG per tablet Take 1 tablet by mouth 3 (Three) Times a Day As Needed for Pain.  0    lisinopril-hydrochlorothiazide (PRINZIDE,ZESTORETIC) 20-25 MG per tablet Take 1 tablet by mouth Daily.  0    meloxicam (MOBIC) 15 MG tablet Take 1 tablet by mouth Daily.      montelukast (SINGULAIR) 10 MG tablet Take 1 tablet by mouth Daily.  0     No current facility-administered medications for this visit.       Review of Systems  All other systems were reviewed and are negative.  Exceptions are included in the HPI or as otherwise noted above.     Objective   Blood pressure 130/70, pulse 68, temperature 97.9 °F (36.6 °C), height 180.3 cm (71\"), weight 91.7 kg (202 lb 3.2 oz), SpO2 95%.  Physical Exam  Constitutional:       General: He is not in acute distress.     Appearance: He is well-developed.   HENT:      Head: Normocephalic and atraumatic.      Right Ear: External ear normal.      Left Ear: External ear normal.      Nose: Nose normal.   Eyes:      Pupils: Pupils are equal, round, and reactive to light.   Neck:      Trachea: No tracheal deviation.   Pulmonary:      Effort: Pulmonary effort is normal. No respiratory distress.   Musculoskeletal:         " General: Normal range of motion.      Cervical back: Normal range of motion and neck supple.   Skin:     General: Skin is warm.      Findings: No erythema or rash.      Nails: There is no clubbing.   Neurological:      Mental Status: He is alert and oriented to person, place, and time.   Psychiatric:         Behavior: Behavior normal.         Thought Content: Thought content normal.         Judgment: Judgment normal.         PFTs:  Performed in clinic and personally reviewed.  There is no obstruction.  The lung volumes are normal.  The DLCO is normal.     Imaging:  Low dose CT chest from 10/10/23 showed a 12 x 7mm nodule in the left upper lobe.     PET scan from 2/16/24 showed a 14mm left upper lobe spiculated solid nodule with mild FDG uptake with a Max SUV of 2.9 and a moderately hypermetabolic left hilar lymph node with a max SUV of 3.9.    Assessment & Plan   Diagnoses and all orders for this visit:    1. Incidental lung nodule, greater than or equal to 8mm (Primary)    2. Tobacco use        Discussion:  Mr. Espinal is a 67yo M who is referred for a pulmonary nodule.     1. Left Upper Lobe Pulmonary Nodule  - Measures 14mm with mild FDG uptake concerning for a malignancy.   - There is also a PET positive left hilar lymph node but this is not amenable to biopsy as it is in the AP window.   - I have discussed biopsy via Robotic Bronchoscopy with the patient and he is agreeable to having this performed. We will plan to do this next week. He is on ASA 81mg but this does not need to be held.     2. Tobacco Dependence  - Encouraged to quit smoking.          Juancarlos Espinal Jr.  reports that he has been smoking cigarettes. He has a 67.50 pack-year smoking history. He has been exposed to tobacco smoke. He has never used smokeless tobacco.. I have educated him on the risk of diseases from using tobacco products such as cancer, COPD, and heart disease.     I advised him to quit and he is willing to quit.       I have spent  62 minutes reviewing the patient record, face to face with the patient in discussion of test results and plans for further management and in documentation and coordination of care. Excluding time spent on other separate services such as performing procedures or test interpretation, if applicable.        Gabriella V Case, DO  Pulmonary and Critical Care Medicine  Note Electronically Signed

## 2024-02-27 NOTE — PROGRESS NOTES
Initial Pulmonary Consult Note    Subjective   Reason for consultation/Chief Complaint: Lung Nodule    Juancarlos Espinal Jr. is a 68 y.o. male is being seen for consultation today at the request of Francine Spence,*    History of Present Illness    Mr. Espinal is a 69yo M current smoker who is referred for a lung nodule. Low dose CT chest from 10/10/23 showed a 12 x 7mm nodule in the left upper lobe. A PET scan from 2/16/24 showed a 14mm left upper lobe spiculated solid nodule with mild FDG uptake with a Max SUV of 2.9 and a moderately hypermetabolic left hilar lymph node with a max SUV of 3.9.     Active Ambulatory Problems     Diagnosis Date Noted    Hypertension 09/14/2017    Tobacco use 09/14/2017    Hyperlipidemia 09/14/2017    COPD (chronic obstructive pulmonary disease) 02/27/2019    Chronic pain 02/27/2019    Arthritis 02/27/2019    Anxiety 02/27/2019    Dizziness 11/01/2022    PVC (premature ventricular contraction) 11/01/2022    Abnormal electrocardiogram (ECG) (EKG) 11/01/2022    Incidental lung nodule, greater than or equal to 8mm 02/27/2024     Resolved Ambulatory Problems     Diagnosis Date Noted    Chest pain, unspecified 09/14/2017     Past Medical History:   Diagnosis Date    Hyperlipemia        Past Surgical History:   Procedure Laterality Date    STEROID INJECTION HIP Right        Family History   Problem Relation Age of Onset    Alzheimer's disease Mother     Diabetes Mother     Valvular heart disease Sister     No Known Problems Maternal Grandmother     No Known Problems Maternal Grandfather     No Known Problems Paternal Grandmother     No Known Problems Paternal Grandfather        Social History     Socioeconomic History    Marital status:    Tobacco Use    Smoking status: Every Day     Packs/day: 1.50     Years: 45.00     Additional pack years: 0.00     Total pack years: 67.50     Types: Cigarettes     Passive exposure: Current    Smokeless tobacco: Never   Vaping Use    Vaping  "Use: Never used   Substance and Sexual Activity    Alcohol use: Yes     Comment: occasional    Drug use: No    Sexual activity: Defer       No Known Allergies    Current Outpatient Medications   Medication Sig Dispense Refill    aspirin 81 MG EC tablet Take 1 tablet by mouth Daily. 30 tablet 0    atorvastatin (LIPITOR) 80 MG tablet Take 1 tablet by mouth Daily. 90 tablet 0    clonazePAM (KlonoPIN) 1 MG tablet Take 1 tablet by mouth 2 (Two) Times a Day As Needed for Seizures.      CloNIDine (CATAPRES) 0.1 MG tablet Take 1 tablet by mouth 3 (Three) Times a Day As Needed for High Blood Pressure (If your blood pressure is over 180). 30 tablet 0    gabapentin (NEURONTIN) 300 MG capsule Take 1 capsule by mouth Every Night. 30 capsule 2    HYDROcodone-acetaminophen (NORCO)  MG per tablet Take 1 tablet by mouth 3 (Three) Times a Day As Needed for Pain.  0    lisinopril-hydrochlorothiazide (PRINZIDE,ZESTORETIC) 20-25 MG per tablet Take 1 tablet by mouth Daily.  0    meloxicam (MOBIC) 15 MG tablet Take 1 tablet by mouth Daily.      montelukast (SINGULAIR) 10 MG tablet Take 1 tablet by mouth Daily.  0     No current facility-administered medications for this visit.       Review of Systems  All other systems were reviewed and are negative.  Exceptions are included in the HPI or as otherwise noted above.     Objective   Blood pressure 130/70, pulse 68, temperature 97.9 °F (36.6 °C), height 180.3 cm (71\"), weight 91.7 kg (202 lb 3.2 oz), SpO2 95%.  Physical Exam  Constitutional:       General: He is not in acute distress.     Appearance: He is well-developed.   HENT:      Head: Normocephalic and atraumatic.      Right Ear: External ear normal.      Left Ear: External ear normal.      Nose: Nose normal.   Eyes:      Pupils: Pupils are equal, round, and reactive to light.   Neck:      Trachea: No tracheal deviation.   Pulmonary:      Effort: Pulmonary effort is normal. No respiratory distress.   Musculoskeletal:         " General: Normal range of motion.      Cervical back: Normal range of motion and neck supple.   Skin:     General: Skin is warm.      Findings: No erythema or rash.      Nails: There is no clubbing.   Neurological:      Mental Status: He is alert and oriented to person, place, and time.   Psychiatric:         Behavior: Behavior normal.         Thought Content: Thought content normal.         Judgment: Judgment normal.         PFTs:  Performed in clinic and personally reviewed.  There is no obstruction.  The lung volumes are normal.  The DLCO is normal.     Imaging:  Low dose CT chest from 10/10/23 showed a 12 x 7mm nodule in the left upper lobe.     PET scan from 2/16/24 showed a 14mm left upper lobe spiculated solid nodule with mild FDG uptake with a Max SUV of 2.9 and a moderately hypermetabolic left hilar lymph node with a max SUV of 3.9.    Assessment & Plan   Diagnoses and all orders for this visit:    1. Incidental lung nodule, greater than or equal to 8mm (Primary)    2. Tobacco use        Discussion:  Mr. Espinal is a 69yo M who is referred for a pulmonary nodule.     1. Left Upper Lobe Pulmonary Nodule  - Measures 14mm with mild FDG uptake concerning for a malignancy.   - There is also a PET positive left hilar lymph node but this is not amenable to biopsy as it is in the AP window.   - I have discussed biopsy via Robotic Bronchoscopy with the patient and he is agreeable to having this performed. We will plan to do this next week. He is on ASA 81mg but this does not need to be held.     2. Tobacco Dependence  - Encouraged to quit smoking.          Juancarlos Espinal Jr.  reports that he has been smoking cigarettes. He has a 67.50 pack-year smoking history. He has been exposed to tobacco smoke. He has never used smokeless tobacco.. I have educated him on the risk of diseases from using tobacco products such as cancer, COPD, and heart disease.     I advised him to quit and he is willing to quit.       I have spent  62 minutes reviewing the patient record, face to face with the patient in discussion of test results and plans for further management and in documentation and coordination of care. Excluding time spent on other separate services such as performing procedures or test interpretation, if applicable.        Gabriella V Case, DO  Pulmonary and Critical Care Medicine  Note Electronically Signed

## 2024-03-04 ENCOUNTER — HOSPITAL ENCOUNTER (OUTPATIENT)
Dept: CT IMAGING | Facility: HOSPITAL | Age: 69
Discharge: HOME | End: 2024-03-04
Admitting: INTERNAL MEDICINE
Payer: MEDICARE

## 2024-03-04 DIAGNOSIS — R91.1 LUNG NODULE: ICD-10-CM

## 2024-03-04 PROCEDURE — 71250 CT THORAX DX C-: CPT

## 2024-03-06 ENCOUNTER — PRE-ADMISSION TESTING (OUTPATIENT)
Dept: PREADMISSION TESTING | Facility: HOSPITAL | Age: 69
End: 2024-03-06
Payer: MEDICARE

## 2024-03-06 VITALS — BODY MASS INDEX: 28.63 KG/M2 | WEIGHT: 200 LBS | HEIGHT: 70 IN

## 2024-03-06 DIAGNOSIS — R91.1 LUNG NODULE: ICD-10-CM

## 2024-03-06 LAB
ALBUMIN SERPL-MCNC: 4.2 G/DL (ref 3.5–5.2)
ALBUMIN/GLOB SERPL: 1.6 G/DL
ALP SERPL-CCNC: 74 U/L (ref 39–117)
ALT SERPL W P-5'-P-CCNC: 30 U/L (ref 1–41)
ANION GAP SERPL CALCULATED.3IONS-SCNC: 10 MMOL/L (ref 5–15)
APTT PPP: 34.7 SECONDS (ref 22–39)
AST SERPL-CCNC: 24 U/L (ref 1–40)
BASOPHILS # BLD AUTO: 0.05 10*3/MM3 (ref 0–0.2)
BASOPHILS NFR BLD AUTO: 0.7 % (ref 0–1.5)
BILIRUB SERPL-MCNC: 0.6 MG/DL (ref 0–1.2)
BUN SERPL-MCNC: 16 MG/DL (ref 8–23)
BUN/CREAT SERPL: 18 (ref 7–25)
CALCIUM SPEC-SCNC: 9.2 MG/DL (ref 8.6–10.5)
CHLORIDE SERPL-SCNC: 103 MMOL/L (ref 98–107)
CO2 SERPL-SCNC: 28 MMOL/L (ref 22–29)
CREAT SERPL-MCNC: 0.89 MG/DL (ref 0.76–1.27)
DEPRECATED RDW RBC AUTO: 41 FL (ref 37–54)
EGFRCR SERPLBLD CKD-EPI 2021: 93.3 ML/MIN/1.73
EOSINOPHIL # BLD AUTO: 0.14 10*3/MM3 (ref 0–0.4)
EOSINOPHIL NFR BLD AUTO: 1.8 % (ref 0.3–6.2)
ERYTHROCYTE [DISTWIDTH] IN BLOOD BY AUTOMATED COUNT: 12.2 % (ref 12.3–15.4)
GLOBULIN UR ELPH-MCNC: 2.7 GM/DL
GLUCOSE SERPL-MCNC: 111 MG/DL (ref 65–99)
HCT VFR BLD AUTO: 46.3 % (ref 37.5–51)
HGB BLD-MCNC: 15.7 G/DL (ref 13–17.7)
IMM GRANULOCYTES # BLD AUTO: 0.04 10*3/MM3 (ref 0–0.05)
IMM GRANULOCYTES NFR BLD AUTO: 0.5 % (ref 0–0.5)
INR PPP: 0.98 (ref 0.89–1.12)
LYMPHOCYTES # BLD AUTO: 1.86 10*3/MM3 (ref 0.7–3.1)
LYMPHOCYTES NFR BLD AUTO: 24.4 % (ref 19.6–45.3)
MCH RBC QN AUTO: 30.8 PG (ref 26.6–33)
MCHC RBC AUTO-ENTMCNC: 33.9 G/DL (ref 31.5–35.7)
MCV RBC AUTO: 90.8 FL (ref 79–97)
MONOCYTES # BLD AUTO: 0.75 10*3/MM3 (ref 0.1–0.9)
MONOCYTES NFR BLD AUTO: 9.8 % (ref 5–12)
NEUTROPHILS NFR BLD AUTO: 4.79 10*3/MM3 (ref 1.7–7)
NEUTROPHILS NFR BLD AUTO: 62.8 % (ref 42.7–76)
NRBC BLD AUTO-RTO: 0 /100 WBC (ref 0–0.2)
PLATELET # BLD AUTO: 229 10*3/MM3 (ref 140–450)
PMV BLD AUTO: 9.8 FL (ref 6–12)
POTASSIUM SERPL-SCNC: 4.6 MMOL/L (ref 3.5–5.2)
PROT SERPL-MCNC: 6.9 G/DL (ref 6–8.5)
PROTHROMBIN TIME: 13.1 SECONDS (ref 12.2–14.5)
QT INTERVAL: 426 MS
QTC INTERVAL: 460 MS
RBC # BLD AUTO: 5.1 10*6/MM3 (ref 4.14–5.8)
SODIUM SERPL-SCNC: 141 MMOL/L (ref 136–145)
WBC NRBC COR # BLD AUTO: 7.63 10*3/MM3 (ref 3.4–10.8)

## 2024-03-06 PROCEDURE — 93010 ELECTROCARDIOGRAM REPORT: CPT | Performed by: INTERNAL MEDICINE

## 2024-03-06 PROCEDURE — 85610 PROTHROMBIN TIME: CPT

## 2024-03-06 PROCEDURE — 93005 ELECTROCARDIOGRAM TRACING: CPT

## 2024-03-06 PROCEDURE — 85730 THROMBOPLASTIN TIME PARTIAL: CPT

## 2024-03-06 PROCEDURE — 36415 COLL VENOUS BLD VENIPUNCTURE: CPT

## 2024-03-06 PROCEDURE — 80053 COMPREHEN METABOLIC PANEL: CPT

## 2024-03-06 PROCEDURE — 85025 COMPLETE CBC W/AUTO DIFF WBC: CPT

## 2024-03-06 NOTE — DISCHARGE INSTRUCTIONS
The following information and instructions were given:    Do not eat, drink, smoke or chew gum after midnight the night before surgery. This includes no mints.  Take all routine, prescribed medications including heart and blood pressure medicines with a sip of water unless otherwise instructed by your physician.   Do NOT take diabetic medication unless instructed by your physician.    DO NOT shave for two days before your procedure.  Do not wear makeup.      DO NOT wear fingernail polish (gel/regular) and/or acrylic/artificial nails on the day of surgery.   If you had a recent manicure and would rather not remove polish or artificial nails, the minimum requirement is that the polish/artificial nails must be removed from the middle finger on each hand.      Remove all jewelry (advise to go to jeweler if unable to remove).  Jewelry, especially rings, can no longer be taped for surgery.    Leave anything you consider valuable at home.    Bring the following with you the day of your procedure (when applicable):       -Picture ID and insurance cards       -Co-pay/deductible required by insurance       -Medications in the original bottles or detailed list (must include name of medication, dosage, and frequency).  This includes all over-the-counter medications if not brought to PAT       -Copy of advance directive, living will or power of  documents if not brought to PAT       -PAT Pass    Educational handout related to procedure given to patient.  Educational handout also includes general information related to their recovery that mentions signs and symptoms of infection and when to call the doctor.

## 2024-03-06 NOTE — PAT
An arrival time for procedure was not provided during PAT visit. If patient had any questions or concerns about their arrival time, they were instructed to contact their surgeon/physician.  Additionally, if the patient referred to an arrival time that was acquired from their my chart account, patient was encouraged to verify that time with their surgeon/physician. Arrival times are NOT provided in Pre Admission Testing Department.    Per Anesthesia Request, patient instructed not to take their ACE/ARB medications on the AM of surgery.    Frequent PVCs reviewed with Dr. Muhammad, pt is cleared for procedure with no further testing needed at this time per Dr. Muhammad.

## 2024-03-07 ENCOUNTER — HOSPITAL ENCOUNTER (OUTPATIENT)
Facility: HOSPITAL | Age: 69
Setting detail: HOSPITAL OUTPATIENT SURGERY
Discharge: HOME | End: 2024-03-07
Attending: INTERNAL MEDICINE | Admitting: INTERNAL MEDICINE
Payer: MEDICARE

## 2024-03-07 ENCOUNTER — APPOINTMENT (OUTPATIENT)
Dept: GENERAL RADIOLOGY | Facility: HOSPITAL | Age: 69
End: 2024-03-07
Payer: MEDICARE

## 2024-03-07 ENCOUNTER — ANESTHESIA (OUTPATIENT)
Dept: GASTROENTEROLOGY | Facility: HOSPITAL | Age: 69
End: 2024-03-07
Payer: MEDICARE

## 2024-03-07 ENCOUNTER — ANESTHESIA EVENT (OUTPATIENT)
Dept: GASTROENTEROLOGY | Facility: HOSPITAL | Age: 69
End: 2024-03-07
Payer: MEDICARE

## 2024-03-07 VITALS
RESPIRATION RATE: 16 BRPM | OXYGEN SATURATION: 94 % | TEMPERATURE: 97.5 F | SYSTOLIC BLOOD PRESSURE: 138 MMHG | DIASTOLIC BLOOD PRESSURE: 83 MMHG | HEART RATE: 67 BPM

## 2024-03-07 DIAGNOSIS — R91.1 LUNG NODULE: ICD-10-CM

## 2024-03-07 PROCEDURE — 88305 TISSUE EXAM BY PATHOLOGIST: CPT | Performed by: INTERNAL MEDICINE

## 2024-03-07 PROCEDURE — 25010000002 ONDANSETRON PER 1 MG: Performed by: NURSE ANESTHETIST, CERTIFIED REGISTERED

## 2024-03-07 PROCEDURE — 31654 BRONCH EBUS IVNTJ PERPH LES: CPT | Performed by: INTERNAL MEDICINE

## 2024-03-07 PROCEDURE — 76000 FLUOROSCOPY <1 HR PHYS/QHP: CPT | Performed by: INTERNAL MEDICINE

## 2024-03-07 PROCEDURE — 31629 BRONCHOSCOPY/NEEDLE BX EACH: CPT | Performed by: INTERNAL MEDICINE

## 2024-03-07 PROCEDURE — 25010000002 DEXAMETHASONE PER 1 MG: Performed by: NURSE ANESTHETIST, CERTIFIED REGISTERED

## 2024-03-07 PROCEDURE — 31628 BRONCHOSCOPY/LUNG BX EACH: CPT | Performed by: INTERNAL MEDICINE

## 2024-03-07 PROCEDURE — 88342 IMHCHEM/IMCYTCHM 1ST ANTB: CPT | Performed by: INTERNAL MEDICINE

## 2024-03-07 PROCEDURE — 76000 FLUOROSCOPY <1 HR PHYS/QHP: CPT

## 2024-03-07 PROCEDURE — 25010000002 SUGAMMADEX 200 MG/2ML SOLUTION: Performed by: NURSE ANESTHETIST, CERTIFIED REGISTERED

## 2024-03-07 PROCEDURE — 31627 NAVIGATIONAL BRONCHOSCOPY: CPT | Performed by: INTERNAL MEDICINE

## 2024-03-07 PROCEDURE — 25010000002 PROPOFOL 10 MG/ML EMULSION: Performed by: NURSE ANESTHETIST, CERTIFIED REGISTERED

## 2024-03-07 PROCEDURE — 88341 IMHCHEM/IMCYTCHM EA ADD ANTB: CPT | Performed by: INTERNAL MEDICINE

## 2024-03-07 PROCEDURE — 31624 DX BRONCHOSCOPE/LAVAGE: CPT | Performed by: INTERNAL MEDICINE

## 2024-03-07 PROCEDURE — 88360 TUMOR IMMUNOHISTOCHEM/MANUAL: CPT | Performed by: INTERNAL MEDICINE

## 2024-03-07 PROCEDURE — 25810000003 LACTATED RINGERS PER 1000 ML: Performed by: ANESTHESIOLOGY

## 2024-03-07 PROCEDURE — 71045 X-RAY EXAM CHEST 1 VIEW: CPT

## 2024-03-07 PROCEDURE — 31623 DX BRONCHOSCOPE/BRUSH: CPT | Performed by: INTERNAL MEDICINE

## 2024-03-07 RX ORDER — FAMOTIDINE 10 MG/ML
20 INJECTION, SOLUTION INTRAVENOUS ONCE
Status: DISCONTINUED | OUTPATIENT
Start: 2024-03-07 | End: 2024-03-07 | Stop reason: HOSPADM

## 2024-03-07 RX ORDER — MIDAZOLAM HYDROCHLORIDE 1 MG/ML
0.5 INJECTION INTRAMUSCULAR; INTRAVENOUS
Status: DISCONTINUED | OUTPATIENT
Start: 2024-03-07 | End: 2024-03-07 | Stop reason: HOSPADM

## 2024-03-07 RX ORDER — ALBUTEROL SULFATE 2.5 MG/3ML
2.5 SOLUTION RESPIRATORY (INHALATION) ONCE AS NEEDED
Status: DISCONTINUED | OUTPATIENT
Start: 2024-03-07 | End: 2024-03-07 | Stop reason: HOSPADM

## 2024-03-07 RX ORDER — ONDANSETRON 2 MG/ML
INJECTION INTRAMUSCULAR; INTRAVENOUS AS NEEDED
Status: DISCONTINUED | OUTPATIENT
Start: 2024-03-07 | End: 2024-03-07 | Stop reason: SURG

## 2024-03-07 RX ORDER — DEXAMETHASONE SODIUM PHOSPHATE 4 MG/ML
8 INJECTION, SOLUTION INTRA-ARTICULAR; INTRALESIONAL; INTRAMUSCULAR; INTRAVENOUS; SOFT TISSUE ONCE AS NEEDED
Status: DISCONTINUED | OUTPATIENT
Start: 2024-03-07 | End: 2024-03-07 | Stop reason: HOSPADM

## 2024-03-07 RX ORDER — FENTANYL CITRATE 50 UG/ML
50 INJECTION, SOLUTION INTRAMUSCULAR; INTRAVENOUS
Status: DISCONTINUED | OUTPATIENT
Start: 2024-03-07 | End: 2024-03-07 | Stop reason: HOSPADM

## 2024-03-07 RX ORDER — DEXAMETHASONE SODIUM PHOSPHATE 4 MG/ML
INJECTION, SOLUTION INTRA-ARTICULAR; INTRALESIONAL; INTRAMUSCULAR; INTRAVENOUS; SOFT TISSUE AS NEEDED
Status: DISCONTINUED | OUTPATIENT
Start: 2024-03-07 | End: 2024-03-07 | Stop reason: SURG

## 2024-03-07 RX ORDER — PROPOFOL 10 MG/ML
VIAL (ML) INTRAVENOUS AS NEEDED
Status: DISCONTINUED | OUTPATIENT
Start: 2024-03-07 | End: 2024-03-07 | Stop reason: SURG

## 2024-03-07 RX ORDER — SODIUM CHLORIDE 0.9 % (FLUSH) 0.9 %
10 SYRINGE (ML) INJECTION AS NEEDED
Status: DISCONTINUED | OUTPATIENT
Start: 2024-03-07 | End: 2024-03-07 | Stop reason: HOSPADM

## 2024-03-07 RX ORDER — SODIUM CHLORIDE 9 MG/ML
40 INJECTION, SOLUTION INTRAVENOUS AS NEEDED
Status: DISCONTINUED | OUTPATIENT
Start: 2024-03-07 | End: 2024-03-07 | Stop reason: HOSPADM

## 2024-03-07 RX ORDER — SODIUM CHLORIDE, SODIUM LACTATE, POTASSIUM CHLORIDE, CALCIUM CHLORIDE 600; 310; 30; 20 MG/100ML; MG/100ML; MG/100ML; MG/100ML
9 INJECTION, SOLUTION INTRAVENOUS CONTINUOUS
Status: DISCONTINUED | OUTPATIENT
Start: 2024-03-07 | End: 2024-03-07 | Stop reason: HOSPADM

## 2024-03-07 RX ORDER — ROCURONIUM BROMIDE 10 MG/ML
INJECTION, SOLUTION INTRAVENOUS AS NEEDED
Status: DISCONTINUED | OUTPATIENT
Start: 2024-03-07 | End: 2024-03-07 | Stop reason: SURG

## 2024-03-07 RX ORDER — ONDANSETRON 2 MG/ML
4 INJECTION INTRAMUSCULAR; INTRAVENOUS ONCE AS NEEDED
Status: DISCONTINUED | OUTPATIENT
Start: 2024-03-07 | End: 2024-03-07 | Stop reason: HOSPADM

## 2024-03-07 RX ORDER — SUCCINYLCHOLINE/SOD CL,ISO/PF 200MG/10ML
SYRINGE (ML) INTRAVENOUS AS NEEDED
Status: DISCONTINUED | OUTPATIENT
Start: 2024-03-07 | End: 2024-03-07 | Stop reason: SURG

## 2024-03-07 RX ORDER — LIDOCAINE HYDROCHLORIDE 10 MG/ML
0.5 INJECTION, SOLUTION EPIDURAL; INFILTRATION; INTRACAUDAL; PERINEURAL ONCE AS NEEDED
Status: DISCONTINUED | OUTPATIENT
Start: 2024-03-07 | End: 2024-03-07 | Stop reason: HOSPADM

## 2024-03-07 RX ORDER — LIDOCAINE HYDROCHLORIDE 10 MG/ML
INJECTION, SOLUTION EPIDURAL; INFILTRATION; INTRACAUDAL; PERINEURAL AS NEEDED
Status: DISCONTINUED | OUTPATIENT
Start: 2024-03-07 | End: 2024-03-07 | Stop reason: SURG

## 2024-03-07 RX ORDER — FAMOTIDINE 20 MG/1
20 TABLET, FILM COATED ORAL ONCE
Status: DISCONTINUED | OUTPATIENT
Start: 2024-03-07 | End: 2024-03-07 | Stop reason: HOSPADM

## 2024-03-07 RX ORDER — HYDROMORPHONE HYDROCHLORIDE 1 MG/ML
0.5 INJECTION, SOLUTION INTRAMUSCULAR; INTRAVENOUS; SUBCUTANEOUS
Status: DISCONTINUED | OUTPATIENT
Start: 2024-03-07 | End: 2024-03-07 | Stop reason: HOSPADM

## 2024-03-07 RX ORDER — LIDOCAINE HYDROCHLORIDE 40 MG/ML
4 INJECTION, SOLUTION RETROBULBAR; TOPICAL ONCE
Status: DISCONTINUED | OUTPATIENT
Start: 2024-03-07 | End: 2024-03-07 | Stop reason: HOSPADM

## 2024-03-07 RX ORDER — SODIUM CHLORIDE 0.9 % (FLUSH) 0.9 %
10 SYRINGE (ML) INJECTION EVERY 12 HOURS SCHEDULED
Status: DISCONTINUED | OUTPATIENT
Start: 2024-03-07 | End: 2024-03-07 | Stop reason: HOSPADM

## 2024-03-07 RX ADMIN — DEXAMETHASONE SODIUM PHOSPHATE 4 MG: 4 INJECTION INTRA-ARTICULAR; INTRALESIONAL; INTRAMUSCULAR; INTRAVENOUS; SOFT TISSUE at 11:19

## 2024-03-07 RX ADMIN — ROCURONIUM BROMIDE 50 MG: 10 INJECTION INTRAVENOUS at 11:15

## 2024-03-07 RX ADMIN — SODIUM CHLORIDE, POTASSIUM CHLORIDE, SODIUM LACTATE AND CALCIUM CHLORIDE 9 ML/HR: 600; 310; 30; 20 INJECTION, SOLUTION INTRAVENOUS at 09:18

## 2024-03-07 RX ADMIN — PROPOFOL 200 MG: 10 INJECTION, EMULSION INTRAVENOUS at 11:15

## 2024-03-07 RX ADMIN — ONDANSETRON 4 MG: 2 INJECTION INTRAMUSCULAR; INTRAVENOUS at 11:19

## 2024-03-07 RX ADMIN — SUGAMMADEX 200 MG: 100 INJECTION, SOLUTION INTRAVENOUS at 11:58

## 2024-03-07 RX ADMIN — LIDOCAINE HYDROCHLORIDE 50 MG: 10 INJECTION, SOLUTION EPIDURAL; INFILTRATION; INTRACAUDAL; PERINEURAL at 11:15

## 2024-03-07 RX ADMIN — Medication 120 MG: at 11:15

## 2024-03-07 NOTE — ANESTHESIA PROCEDURE NOTES
Airway  Urgency: elective    Date/Time: 3/7/2024 11:20 AM  Airway not difficult    General Information and Staff    Patient location during procedure: OR  CRNA/CAA: Junior MEGHAN Sellers, CRNA    Indications and Patient Condition  Indications for airway management: airway protection    Preoxygenated: yes  MILS not maintained throughout  Mask difficulty assessment: 1 - vent by mask    Final Airway Details  Final airway type: endotracheal airway      Successful airway: ETT  Cuffed: yes   Successful intubation technique: direct laryngoscopy  Endotracheal tube insertion site: oral  Blade: Juan  Blade size: 3  ETT size (mm): 7.0  Cormack-Lehane Classification: grade I - full view of glottis  Placement verified by: chest auscultation and capnometry   Measured from: lips  ETT/EBT  to lips (cm): 20  Number of attempts at approach: 1  Assessment: lips, teeth, and gum same as pre-op and atraumatic intubation    Additional Comments  Negative epigastric sounds, Breath sound equal bilaterally with symmetric chest rise and fall

## 2024-03-07 NOTE — ANESTHESIA POSTPROCEDURE EVALUATION
Patient: Juancarlos Espinal Jr.    Procedure Summary       Date: 03/07/24 Room / Location:  [facility]    Anesthesia Start: 1105 Anesthesia Stop: 1209    Procedure: BRONCHOSCOPY WITH ION ROBOT Diagnosis:       Lung nodule      (Lung nodule [R91.1])    Surgeons: Gabriella Mccartney DO Provider: Emile Ordoñez MD    Anesthesia Type: general ASA Status: 2            Anesthesia Type: general    Vitals  Vitals Value Taken Time   BP     Temp     Pulse 66 03/07/24 1108   Resp     SpO2 97 % 03/07/24 1108   Vitals shown include unfiled device data.        Post Anesthesia Care and Evaluation    Patient location during evaluation: PACU  Patient participation: complete - patient participated  Level of consciousness: awake and alert  Pain management: adequate    Airway patency: patent  Anesthetic complications: No anesthetic complications  PONV Status: none  Cardiovascular status: hemodynamically stable and acceptable  Respiratory status: nonlabored ventilation, acceptable and nasal cannula  Hydration status: acceptable    Comments: 153/79 [id number]

## 2024-03-07 NOTE — ANESTHESIA PREPROCEDURE EVALUATION
Anesthesia Evaluation     Patient summary reviewed and Nursing notes reviewed   no history of anesthetic complications:   NPO Solid Status: > 8 hours  NPO Liquid Status: > 8 hours           Airway   Mallampati: II  TM distance: >3 FB  Neck ROM: full  No difficulty expected  Dental      Pulmonary - normal exam   (+) COPD,  Cardiovascular - normal exam    (+) hypertension, hyperlipidemia      Neuro/Psych  (+) dizziness/light headedness, psychiatric history  GI/Hepatic/Renal/Endo      Musculoskeletal     Abdominal    Substance History      OB/GYN          Other   arthritis,                 Anesthesia Plan    ASA 2     general     intravenous induction     Anesthetic plan, risks, benefits, and alternatives have been provided, discussed and informed consent has been obtained with: patient.    Plan discussed with CRNA.    CODE STATUS:

## 2024-03-12 DIAGNOSIS — C34.92 PRIMARY LUNG CANCER, LEFT: Primary | ICD-10-CM

## 2024-03-13 LAB
CYTO UR: NORMAL
LAB AP CASE REPORT: NORMAL
LAB AP CLINICAL INFORMATION: NORMAL
LAB AP DIAGNOSIS COMMENT: NORMAL
PATH REPORT.ADDENDUM SPEC: NORMAL
PATH REPORT.FINAL DX SPEC: NORMAL
PATH REPORT.GROSS SPEC: NORMAL

## 2024-03-14 LAB — REF LAB TEST METHOD: NORMAL

## 2024-03-15 NOTE — PROGRESS NOTES
Follow-up Visit      Date: 2024  Patient Name: Juancarlos sEpinal Jr.  : 1955   MRN: [id number]     Chief Complaint:    Chief Complaint   Patient presents with    Pure hypercholesterolemia       History of Present Illness: Juancarlos Espinal Jr. is a 68 y.o. male who is here today for for follow-up on his coronary artery disease.  Patient recently was diagnosed with lung cancer.  He has appointment to see his pulmonologist and oncologist as designed a further treatment option.  He has almost quit smoking and is trying to quit.  Patient has occasional shortness of breath and burning in the chest.  Patient started feeling more burning after he has a bronchoscopy done.  Patient otherwise does not have any significant disease      Problem List     CARDIAC:  Coronary artery disease:  Chest pain, stress test, 3/2019: Normal  Stress test 2022: Normal  Stress test 2023 low risk     Myocardium:  Echo, 2016: LVEF 56%, NATALIE  Echo, 2022: Normal  Echo, 2023 EF 60%     Valvular:  Mild MR    Electrical:  PVCs    Percardium:  Normal pericardium      VASCULAR:  Peripheral vascular disease:  Claudication, ABIs, 3/19/2021: Normal                       CARDIAC RISK FACTORS:  68-pack-year smoking  Hypertension  Hyperlipidemia  2024   HDL 40 LDL 52    NON CARDIAC:  COPD  Arthritis  Anxiety  Chronic pain  Pulmonary adenocarcinoma  SURGERIES:  None      Subjective      Review of Systems:   Review of Systems   Respiratory: Negative.     Cardiovascular: Negative.        Medications:     Current Outpatient Medications:     aspirin 81 MG EC tablet, Take 1 tablet by mouth Daily., Disp: 30 tablet, Rfl: 0    atorvastatin (LIPITOR) 80 MG tablet, Take 1 tablet by mouth Daily., Disp: 90 tablet, Rfl: 0    cetirizine (zyrTEC) 10 MG tablet, Take 1 tablet by mouth Daily., Disp: , Rfl:     clonazePAM (KlonoPIN) 1 MG tablet, Take 1 tablet by mouth 2 (Two) Times a Day As Needed for Seizures., Disp: , Rfl:      "CloNIDine (CATAPRES) 0.1 MG tablet, Take 1 tablet by mouth 3 (Three) Times a Day As Needed for High Blood Pressure (If your blood pressure is over 180)., Disp: 30 tablet, Rfl: 0    fluticasone (FLONASE) 50 MCG/ACT nasal spray, 2 sprays by Each Nare route Daily., Disp: , Rfl:     gabapentin (NEURONTIN) 300 MG capsule, Take 1 capsule by mouth Every Night., Disp: 30 capsule, Rfl: 2    HYDROcodone-acetaminophen (NORCO)  MG per tablet, Take 1 tablet by mouth 3 (Three) Times a Day As Needed for Pain., Disp: , Rfl: 0    lisinopril-hydrochlorothiazide (PRINZIDE,ZESTORETIC) 20-25 MG per tablet, Take 1 tablet by mouth Daily., Disp: , Rfl: 0    meloxicam (MOBIC) 15 MG tablet, Take 1 tablet by mouth Daily., Disp: , Rfl:     montelukast (SINGULAIR) 10 MG tablet, Take 1 tablet by mouth Daily., Disp: , Rfl: 0    tiZANidine (ZANAFLEX) 4 MG tablet, Take 1 tablet by mouth Every 8 (Eight) Hours As Needed., Disp: , Rfl:     Allergies:   No Known Allergies    Objective     Physical Exam:  Vitals:    03/19/24 1416   BP: 108/60   BP Location: Left arm   Patient Position: Sitting   Cuff Size: Adult   Pulse: 80   SpO2: 96%   Weight: 93.9 kg (207 lb)   Height: 180.3 cm (71\")     Body mass index is 28.87 kg/m².      Constitutional:       General: Not in acute distress.     Appearance: Healthy appearance. Not in distress.     Neck:     JVP: Not elevated     Carotid artery: No carotid bruit    Pulmonary:      Effort: Pulmonary effort is normal.      Breath sounds: Normal breath sounds. No wheezing. No rhonchi. No rales.     Cardiovascular:      Regular rate.  Regular rhythm. Normal S1. Normal S2.      Murmurs: There is mild systolic murmur.      No gallop. No click. No rub.     Abdominal:      General: Bowel sounds are normal.      Palpations: Abdomen is soft.      Tenderness: There is no abdominal tenderness.    Extremities:     Pulses: Good distal pulses     Edema: No edema    Smoking Cessation:   Tobacco Product History: less than 3 " minutes spent counseling Will try to cut down    Lab Review:   Lab Results   Component Value Date    GLUCOSE 111 (H) 03/06/2024    BUN 16 03/06/2024    CREATININE 0.89 03/06/2024    EGFRIFNONA 77 02/24/2019    BCR 18.0 03/06/2024    K 4.6 03/06/2024    CO2 28.0 03/06/2024    CALCIUM 9.2 03/06/2024    ALBUMIN 4.2 03/06/2024    AST 24 03/06/2024    ALT 30 03/06/2024     Lab Results   Component Value Date    WBC 7.63 03/06/2024    HGB 15.7 03/06/2024    HCT 46.3 03/06/2024    MCV 90.8 03/06/2024     03/06/2024               Assessment / Plan      Assessment:   Diagnosis Plan   1. Mixed hyperlipidemia        2. Primary hypertension        3. Chronic bronchitis, unspecified chronic bronchitis type             Plan:  Patient lipid has been under good control and he is can continue with his current medications.  His blood pressure has been under good control and we will continue with the current medications  His main problem is his newly diagnosed lung cancer for which she is being evaluated and follow-up on that with his oncologist in April.      Follow Up:       Return in about 6 months (around 9/19/2024).    Ronald Taylor MD

## 2024-03-19 ENCOUNTER — OFFICE VISIT (OUTPATIENT)
Dept: CARDIOLOGY | Facility: CLINIC | Age: 69
End: 2024-03-19
Payer: MEDICARE

## 2024-03-19 VITALS
HEART RATE: 80 BPM | DIASTOLIC BLOOD PRESSURE: 60 MMHG | BODY MASS INDEX: 28.98 KG/M2 | OXYGEN SATURATION: 96 % | SYSTOLIC BLOOD PRESSURE: 108 MMHG | WEIGHT: 207 LBS | HEIGHT: 71 IN

## 2024-03-19 DIAGNOSIS — I10 PRIMARY HYPERTENSION: ICD-10-CM

## 2024-03-19 DIAGNOSIS — E78.2 MIXED HYPERLIPIDEMIA: Primary | ICD-10-CM

## 2024-03-19 DIAGNOSIS — J42 CHRONIC BRONCHITIS, UNSPECIFIED CHRONIC BRONCHITIS TYPE: ICD-10-CM

## 2024-03-19 DIAGNOSIS — C34.90 PRIMARY ADENOCARCINOMA OF LUNG, UNSPECIFIED LATERALITY: Primary | ICD-10-CM

## 2024-03-19 PROCEDURE — 3078F DIAST BP <80 MM HG: CPT | Performed by: INTERNAL MEDICINE

## 2024-03-19 PROCEDURE — 3074F SYST BP LT 130 MM HG: CPT | Performed by: INTERNAL MEDICINE

## 2024-03-19 PROCEDURE — 99214 OFFICE O/P EST MOD 30 MIN: CPT | Performed by: INTERNAL MEDICINE

## 2024-03-19 RX ORDER — TIZANIDINE 4 MG/1
4 TABLET ORAL EVERY 8 HOURS PRN
COMMUNITY
Start: 2024-03-11

## 2024-03-19 RX ORDER — FLUTICASONE PROPIONATE 50 MCG
2 SPRAY, SUSPENSION (ML) NASAL DAILY
COMMUNITY
Start: 2024-03-11

## 2024-03-19 RX ORDER — CETIRIZINE HYDROCHLORIDE 10 MG/1
1 TABLET ORAL DAILY
COMMUNITY
Start: 2024-03-11

## 2024-03-20 ENCOUNTER — PATIENT OUTREACH (OUTPATIENT)
Dept: ONCOLOGY | Facility: CLINIC | Age: 69
End: 2024-03-20
Payer: MEDICARE

## 2024-03-20 DIAGNOSIS — C34.12 MALIGNANT NEOPLASM OF UPPER LOBE OF LEFT LUNG: Primary | ICD-10-CM

## 2024-03-22 ENCOUNTER — PATIENT OUTREACH (OUTPATIENT)
Dept: ONCOLOGY | Facility: CLINIC | Age: 69
End: 2024-03-22
Payer: MEDICARE

## 2024-04-05 ENCOUNTER — HOSPITAL ENCOUNTER (OUTPATIENT)
Dept: MRI IMAGING | Facility: HOSPITAL | Age: 69
Discharge: HOME | End: 2024-04-05
Payer: MEDICARE

## 2024-04-05 DIAGNOSIS — C34.90 PRIMARY ADENOCARCINOMA OF LUNG, UNSPECIFIED LATERALITY: ICD-10-CM

## 2024-04-05 PROCEDURE — 82565 ASSAY OF CREATININE: CPT

## 2024-04-05 PROCEDURE — 70553 MRI BRAIN STEM W/O & W/DYE: CPT

## 2024-04-05 PROCEDURE — 0 GADOBENATE DIMEGLUMINE 529 MG/ML SOLUTION: Performed by: INTERNAL MEDICINE

## 2024-04-05 PROCEDURE — A9577 INJ MULTIHANCE: HCPCS | Performed by: INTERNAL MEDICINE

## 2024-04-05 RX ADMIN — GADOBENATE DIMEGLUMINE 18 ML: 529 INJECTION, SOLUTION INTRAVENOUS at 17:27

## 2024-04-07 LAB — CREAT BLDA-MCNC: 1 MG/DL (ref 0.6–1.3)

## 2024-04-09 ENCOUNTER — CONSULT (OUTPATIENT)
Dept: ONCOLOGY | Facility: CLINIC | Age: 69
End: 2024-04-09
Payer: MEDICARE

## 2024-04-09 ENCOUNTER — OFFICE VISIT (OUTPATIENT)
Dept: CARDIAC SURGERY | Facility: CLINIC | Age: 69
End: 2024-04-09
Payer: MEDICARE

## 2024-04-09 VITALS
OXYGEN SATURATION: 97 % | DIASTOLIC BLOOD PRESSURE: 70 MMHG | HEIGHT: 70 IN | HEART RATE: 74 BPM | WEIGHT: 201 LBS | BODY MASS INDEX: 28.77 KG/M2 | TEMPERATURE: 98.8 F | SYSTOLIC BLOOD PRESSURE: 110 MMHG

## 2024-04-09 VITALS
RESPIRATION RATE: 16 BRPM | WEIGHT: 201 LBS | HEART RATE: 79 BPM | SYSTOLIC BLOOD PRESSURE: 121 MMHG | HEIGHT: 70 IN | OXYGEN SATURATION: 96 % | DIASTOLIC BLOOD PRESSURE: 72 MMHG | TEMPERATURE: 98.9 F | BODY MASS INDEX: 28.77 KG/M2

## 2024-04-09 DIAGNOSIS — C34.92 NSCLC OF LEFT LUNG: Primary | ICD-10-CM

## 2024-04-09 PROCEDURE — 1159F MED LIST DOCD IN RCRD: CPT | Performed by: THORACIC SURGERY (CARDIOTHORACIC VASCULAR SURGERY)

## 2024-04-09 PROCEDURE — 99205 OFFICE O/P NEW HI 60 MIN: CPT | Performed by: INTERNAL MEDICINE

## 2024-04-09 PROCEDURE — 3078F DIAST BP <80 MM HG: CPT | Performed by: THORACIC SURGERY (CARDIOTHORACIC VASCULAR SURGERY)

## 2024-04-09 PROCEDURE — 3074F SYST BP LT 130 MM HG: CPT | Performed by: INTERNAL MEDICINE

## 2024-04-09 PROCEDURE — 3074F SYST BP LT 130 MM HG: CPT | Performed by: THORACIC SURGERY (CARDIOTHORACIC VASCULAR SURGERY)

## 2024-04-09 PROCEDURE — 3078F DIAST BP <80 MM HG: CPT | Performed by: INTERNAL MEDICINE

## 2024-04-09 PROCEDURE — 99204 OFFICE O/P NEW MOD 45 MIN: CPT | Performed by: THORACIC SURGERY (CARDIOTHORACIC VASCULAR SURGERY)

## 2024-04-09 PROCEDURE — 1160F RVW MEDS BY RX/DR IN RCRD: CPT | Performed by: THORACIC SURGERY (CARDIOTHORACIC VASCULAR SURGERY)

## 2024-04-09 PROCEDURE — 1126F AMNT PAIN NOTED NONE PRSNT: CPT | Performed by: INTERNAL MEDICINE

## 2024-04-09 RX ORDER — PALONOSETRON 0.05 MG/ML
0.25 INJECTION, SOLUTION INTRAVENOUS ONCE
OUTPATIENT
Start: 2024-04-30

## 2024-04-09 RX ORDER — SODIUM CHLORIDE 9 MG/ML
20 INJECTION, SOLUTION INTRAVENOUS ONCE
OUTPATIENT
Start: 2024-04-30

## 2024-04-09 RX ORDER — FAMOTIDINE 10 MG/ML
20 INJECTION, SOLUTION INTRAVENOUS ONCE
OUTPATIENT
Start: 2024-04-30

## 2024-04-09 RX ORDER — DIPHENHYDRAMINE HYDROCHLORIDE 50 MG/ML
50 INJECTION INTRAMUSCULAR; INTRAVENOUS AS NEEDED
OUTPATIENT
Start: 2024-04-30

## 2024-04-09 RX ORDER — FAMOTIDINE 10 MG/ML
20 INJECTION, SOLUTION INTRAVENOUS AS NEEDED
OUTPATIENT
Start: 2024-04-30

## 2024-04-09 NOTE — PROGRESS NOTES
New Patient Office Visit      Date: 2024     Patient Name: Juancarlos Espinal Jr.  MRN: [id number]  : 1955  Referring Physician: Macarena Mccartney    Chief Complaint: Establish care for left lung NSCLC-adenocarcinoma    History of Present Illness: Juancarlos Espinal Jr. is a pleasant 68 y.o. male with a past medical history of tobacco abuse, hypertension, hyperlipidemia, anxiety who presents today for evaluation of left lung NSCLC. The patient underwent a low-dose screening CT scan in 2023 which was notable for a 1.2 cm left apical nodule.  He then underwent a PET/CT in 2024 which showed a 1.4 cm lesion in the left upper lobe as well as some concerning left hilar adenopathy.  He was seen by Dr. Mccartney who performed a bronchoscopy with biopsy consistent with a moderately differentiated adenocarcinoma.  Unfortunately the lymph nodes were too difficult to sample due to their location.  MRI brain was without evidence of malignancy.  Today he is doing well.  Denies any chest pain or shortness of breath.  Denies any cough, fevers, chills, weight loss    Oncology History:    Oncology/Hematology History   NSCLC of left lung   2024 Initial Diagnosis    NSCLC of left lung     2024 -  Chemotherapy    OP LUNG  Nivolumab 360mg /  PACLitaxel / CARBOplatin AUC=5          Subjective      Review of Systems:     Constitutional: Negative for fevers, chills, or weight loss  Eyes: Negative for blurred vision or discharge         Ear/Nose/Throat: Negative for difficulty swallowing, sore throat, LAD                                                       Respiratory: Negative for cough, SOA, wheezing                                                                                        Cardiovascular: Negative for chest pain or palpitations                                                                  Gastrointestinal: Negative for nausea, vomiting or diarrhea                                                                      Genitourinary: Negative for dysuria or hematuria                                                                                           Musculoskeletal: Negative for any joint pains or muscle aches                                                                        Neurologic: Negative for any weakness, headaches, dizziness                                                                         Hematologic: Negative for any easy bleeding or bruising                                                                                   Psychiatric: Negative for anxiety or depression                             Past Medical History:   Past Medical History:   Diagnosis Date    Anxiety     Arthritis     Chronic pain     COPD (chronic obstructive pulmonary disease)     Hyperlipemia     Hypertension        Past Surgical History:   Past Surgical History:   Procedure Laterality Date    BRONCHOSCOPY WITH ION ROBOTIC ASSIST N/A 3/7/2024    Procedure: BRONCHOSCOPY WITH ION ROBOT;  Surgeon: Gabriella Mccartney DO;  [facility]: [facility];  Service: Robotics - Pulmonary;  Laterality: N/A;  ION#6 [id number] / #6  0114 CATH GUIDE 0090    COLONOSCOPY  01/2024    STEROID INJECTION HIP Right     multiple       Family History:   Family History   Problem Relation Age of Onset    Alzheimer's disease Mother     Diabetes Mother     Valvular heart disease Sister     No Known Problems Maternal Grandmother     No Known Problems Maternal Grandfather     No Known Problems Paternal Grandmother     No Known Problems Paternal Grandfather        Social History:   Social History     Socioeconomic History    Marital status:    Tobacco Use    Smoking status: Every Day     Current packs/day: 1.50     Average packs/day: 1.5 packs/day for 45.0 years (67.5 ttl pk-yrs)     Types: Cigarettes     Passive exposure: Current    Smokeless tobacco: Never   Vaping Use    Vaping status: Never Used   Substance and Sexual Activity    Alcohol  "use: Yes     Comment: occasional    Drug use: No    Sexual activity: Defer       Medications:     Current Outpatient Medications:     aspirin 81 MG EC tablet, Take 1 tablet by mouth Daily., Disp: 30 tablet, Rfl: 0    atorvastatin (LIPITOR) 80 MG tablet, Take 1 tablet by mouth Daily., Disp: 90 tablet, Rfl: 0    cetirizine (zyrTEC) 10 MG tablet, Take 1 tablet by mouth Daily., Disp: , Rfl:     clonazePAM (KlonoPIN) 1 MG tablet, Take 1 tablet by mouth 2 (Two) Times a Day As Needed for Seizures., Disp: , Rfl:     CloNIDine (CATAPRES) 0.1 MG tablet, Take 1 tablet by mouth 3 (Three) Times a Day As Needed for High Blood Pressure (If your blood pressure is over 180)., Disp: 30 tablet, Rfl: 0    fluticasone (FLONASE) 50 MCG/ACT nasal spray, 2 sprays by Each Nare route Daily., Disp: , Rfl:     gabapentin (NEURONTIN) 300 MG capsule, Take 1 capsule by mouth Every Night., Disp: 30 capsule, Rfl: 2    HYDROcodone-acetaminophen (NORCO)  MG per tablet, Take 1 tablet by mouth 3 (Three) Times a Day As Needed for Pain., Disp: , Rfl: 0    lisinopril-hydrochlorothiazide (PRINZIDE,ZESTORETIC) 20-25 MG per tablet, Take 1 tablet by mouth Daily., Disp: , Rfl: 0    meloxicam (MOBIC) 15 MG tablet, Take 1 tablet by mouth Daily., Disp: , Rfl:     montelukast (SINGULAIR) 10 MG tablet, Take 1 tablet by mouth Daily., Disp: , Rfl: 0    tiZANidine (ZANAFLEX) 4 MG tablet, Take 1 tablet by mouth Every 8 (Eight) Hours As Needed., Disp: , Rfl:     Allergies:   No Known Allergies    Objective     Physical Exam:  Vital Signs:   Vitals:    04/09/24 1321   BP: 121/72  Comment: LUE   Pulse: 79   Resp: 16   Temp: 98.9 °F (37.2 °C)   TempSrc: Temporal   SpO2: 96%  Comment: RA   Weight: 91.2 kg (201 lb)   Height: 177.8 cm (70\")   PainSc: 0-No pain     Pain Score    04/09/24 1321   PainSc: 0-No pain     ECOG Performance Status: 0 - Asymptomatic    Constitutional: NAD, ECOG 0  Eyes: PERRLA, scleral anicteric  ENT: No LAD, no thyromegaly  Respiratory: CTAB, no " wheezing, rales, rhonchi  Cardiovascular: RRR, no murmurs, pulses 2+ bilaterally  Abdomen: soft, NT/ND, no HSM  Musculoskeletal: strength 5/5 bilaterally, no c/c/e  Neurologic: A&O x 3, CN II-XII intact grossly  Psych: mood and affect congruent, no SI or HI    Results Review:   Hospital Outpatient Visit on 04/05/2024   Component Date Value Ref Range Status    Creatinine 04/05/2024 1.00  0.60 - 1.30 mg/dL Final    Serial Number: [id number]:  [id number]       MRI Brain With & Without Contrast    Result Date: 4/6/2024  Narrative: MRI BRAIN W WO CONTRAST Date of Exam: 4/5/2024 4:28 PM EDT Indication: Non-small cell lung cancer (NSCLC), staging.  Comparison: 2/24/2019. Technique:  Routine multiplanar/multisequence sequence images of the brain were obtained before and after the uneventful administration of 13 Multihance. Findings: No acute infarct is present on diffusion weighted sequences. Age-related changes are present with minimal volume loss and mild typical T2 hyperintense subcortical and pontine white matter changes, nonspecific but favored to reflect sequela of chronic microvascular ischemia. There is no evidence of hemorrhage, mass or mass effect otherwise. There is no abnormal brain parenchymal enhancement. The orbits are normal. The paranasal sinuses are clear. Intracranial arterial flow voids are maintained. The ventricles are normal in size and configuration.     Impression: Impression: No evidence of intracranial metastatic disease. Essentially normal contrast-enhanced MRI of the brain as above. Electronically Signed: Mejia Gaytan MD  4/6/2024 1:27 PM EDT  Workstation ID: [id number]     Assessment / Plan      Assessment/Plan:   1. NSCLC of left lung (Primary)  -Initially noted on screening CT scan in October 2023  -PET/CT in February 2024 notable for 1.4 cm left upper lobe lesion with some concerning left hilar adenopathy  -MRI brain in April 2024 without evidence of metastatic disease  -Biopsy March 2024  consistent with a moderately differentiated adenocarcinoma.  PD-L1 less than 1%  -PFTs adequate for surgical intervention in February 2024  -Clinical stage IIB (T1b,N1,M0)  -Discussed the diagnosis, prognosis, tensional treatment plans with patient today  -Although he did not get his hilar lymph node sampled, there is high concern for disease there  -Will plan to discuss the case with Dr. Rubin today  -Will tentatively plan for neoadjuvant carboplatin/Taxol/nivolumab x 3 cycles followed by repeat PET/CT followed by surgical intervention  -Will plan to start treatment about 3 weeks to see is helping his son through some legal matters in the next 1-2 weeks       I spent over 60 minutes on patient's day of visit reviewing his records including progress notes, imaging, pathology as well as discussing his diagnosis, prognosis, treatment plan with patient along with discussing his case with Dr. Rubin, ordering chemotherapy and dictating his case to the medical record    Follow Up:   Follow-up in 3 weeks to begin treatment     Akash Schmitz MD  Hematology and Oncology     Please note that portions of this note may have been completed with a voice recognition program. Efforts were made to edit the dictations, but occasionally words are mistranscribed.

## 2024-04-09 NOTE — PROGRESS NOTES
04/09/2024  Patient Information  Juancarlos Espinal Jr.                                                                                          [address] [facility] [place] [address]   1955  'PCP/Referring Physician'  Francine Spence, APRN  [phone]  Gabriella Mccartney DO  [phone]  Chief Complaint   Patient presents with    Consult     NP per Dr. Gabriella Mccartney for Left Lung Cancer Found on CT Screening       History of Present Illness: 68-year-old  male with a history of hypertension, hyperlipidemia, COPD and active tobacco abuse who presents with newly diagnosed left upper lobe lung cancer.  For the past 3 months, the patient notes a cough when laying down.  He denies hemoptysis, weight loss, lymphadenopathy, fevers or chills.      Patient Active Problem List   Diagnosis    Hypertension    Tobacco use    Hyperlipidemia    COPD (chronic obstructive pulmonary disease)    Chronic pain    Arthritis    Anxiety    Dizziness    PVC (premature ventricular contraction)    Abnormal electrocardiogram (ECG) (EKG)    Incidental lung nodule, greater than or equal to 8mm    Lung nodule    NSCLC of left lung     Past Medical History:   Diagnosis Date    Anxiety     Arthritis     Chronic pain     COPD (chronic obstructive pulmonary disease)     Hyperlipemia     Hypertension     Lung cancer      Past Surgical History:   Procedure Laterality Date    BRONCHOSCOPY WITH ION ROBOTIC ASSIST N/A 03/07/2024    Procedure: BRONCHOSCOPY WITH ION ROBOT;  Surgeon: Gabriella Mccartney DO;  Location: [facility] ENDOSCOPY;  Service: Robotics - Pulmonary;  Laterality: N/A;  ION#6 [id number] / #[id number]  0114 CATH GUIDE 0090    COLONOSCOPY  01/2024    OTHER SURGICAL HISTORY      Fluid removed from testicle    STEROID INJECTION HIP Right     multiple       Current Outpatient Medications:     aspirin 81 MG EC tablet, Take 1 tablet by mouth Daily., Disp: 30 tablet, Rfl: 0    atorvastatin (LIPITOR) 80 MG tablet, Take 1 tablet by mouth  Daily., Disp: 90 tablet, Rfl: 0    cetirizine (zyrTEC) 10 MG tablet, Take 1 tablet by mouth Daily., Disp: , Rfl:     clonazePAM (KlonoPIN) 1 MG tablet, Take 1 tablet by mouth 2 (Two) Times a Day As Needed for Seizures., Disp: , Rfl:     CloNIDine (CATAPRES) 0.1 MG tablet, Take 1 tablet by mouth 3 (Three) Times a Day As Needed for High Blood Pressure (If your blood pressure is over 180)., Disp: 30 tablet, Rfl: 0    fluticasone (FLONASE) 50 MCG/ACT nasal spray, 2 sprays by Each Nare route Daily., Disp: , Rfl:     gabapentin (NEURONTIN) 300 MG capsule, Take 1 capsule by mouth Every Night., Disp: 30 capsule, Rfl: 2    HYDROcodone-acetaminophen (NORCO)  MG per tablet, Take 1 tablet by mouth 3 (Three) Times a Day As Needed for Pain., Disp: , Rfl: 0    lisinopril-hydrochlorothiazide (PRINZIDE,ZESTORETIC) 20-25 MG per tablet, Take 1 tablet by mouth Daily., Disp: , Rfl: 0    meloxicam (MOBIC) 15 MG tablet, Take 1 tablet by mouth Daily., Disp: , Rfl:     montelukast (SINGULAIR) 10 MG tablet, Take 1 tablet by mouth Daily., Disp: , Rfl: 0    tiZANidine (ZANAFLEX) 4 MG tablet, Take 1 tablet by mouth Every 8 (Eight) Hours As Needed., Disp: , Rfl:   No Known Allergies  Social History     Socioeconomic History    Marital status:     Number of children: 1   Tobacco Use    Smoking status: Every Day     Current packs/day: 0.50     Average packs/day: 0.5 packs/day for 50.0 years (25.0 ttl pk-yrs)     Types: Cigarettes     Start date: 4/9/1974     Passive exposure: Current    Smokeless tobacco: Never   Vaping Use    Vaping status: Never Used   Substance and Sexual Activity    Alcohol use: Yes     Comment: occasional    Drug use: No    Sexual activity: Defer     Family History   Problem Relation Age of Onset    Alzheimer's disease Mother     Diabetes Mother     Kidney disease Father     Valvular heart disease Sister     No Known Problems Maternal Grandmother     No Known Problems Maternal Grandfather     No Known Problems  "Paternal Grandmother     No Known Problems Paternal Grandfather      Review of Systems   Constitutional: Negative for chills, fever, malaise/fatigue, night sweats and weight loss.   HENT:  Negative for hearing loss, odynophagia and sore throat.    Cardiovascular:  Positive for orthopnea. Negative for chest pain, dyspnea on exertion, leg swelling and palpitations.   Respiratory:  Positive for cough. Negative for hemoptysis.    Endocrine: Negative for cold intolerance, heat intolerance, polydipsia, polyphagia and polyuria.   Hematologic/Lymphatic: Bruises/bleeds easily.   Skin:  Positive for rash. Negative for itching.   Musculoskeletal:  Negative for joint pain, joint swelling and myalgias.   Gastrointestinal:  Negative for abdominal pain, constipation, diarrhea, hematemesis, hematochezia, melena, nausea and vomiting.   Genitourinary:  Negative for dysuria, frequency and hematuria.   Neurological:  Negative for focal weakness, headaches, numbness and seizures.   Psychiatric/Behavioral:  Negative for suicidal ideas. The patient is nervous/anxious.    All other systems reviewed and are negative.    Vitals:    04/09/24 1416   BP: 110/70   BP Location: Left arm   Patient Position: Sitting   Pulse: 74   Temp: 98.8 °F (37.1 °C)   SpO2: 97%   Weight: 91.2 kg (201 lb)   Height: 177.8 cm (70\")      Physical Exam  Vitals reviewed.   Constitutional:       General: He is not in acute distress.     Appearance: Normal appearance.      Comments:  male who appears stated age   HENT:      Head: Normocephalic and atraumatic.      Nose: Nose normal.   Eyes:      General: No scleral icterus.  Cardiovascular:      Rate and Rhythm: Normal rate and regular rhythm.      Heart sounds: No murmur heard.     No friction rub. No gallop.   Pulmonary:      Effort: Pulmonary effort is normal. No respiratory distress.      Breath sounds: Normal breath sounds. No rhonchi.   Abdominal:      General: There is no distension.      Palpations: " Abdomen is soft. There is no mass.      Tenderness: There is no abdominal tenderness. There is no guarding or rebound.   Musculoskeletal:         General: No deformity.      Cervical back: Neck supple.      Right lower leg: No edema.      Left lower leg: No edema.   Lymphadenopathy:      Cervical: No cervical adenopathy.      Upper Body:      Right upper body: No supraclavicular or axillary adenopathy.      Left upper body: No supraclavicular or axillary adenopathy.   Skin:     General: Skin is warm and dry.      Coloration: Skin is not jaundiced.   Neurological:      Mental Status: He is alert and oriented to person, place, and time.      Gait: Gait normal.   Psychiatric:         Mood and Affect: Mood normal.         Behavior: Behavior normal.         Thought Content: Thought content normal.         Judgment: Judgment normal.         The ROS, past medical history, surgical history, family history, social history, and vitals were reviewed by myself and corrected as needed.      Labs/Imaging:  -CT of the chest without contrast performed 3/4/2024, personally reviewed, demonstrates a 1.5 cm noncalcified nodule in the left upper lobe with a band extending to the apical surface.  -PET/CT performed 2/16/2024, personally reviewed, demonstrates a spiculated noncalcified nodule in the left upper lobe with an SUV of 2.9.  A left hilar node is present with an SUV of 3.9.  -Pulmonary function test performed 2/27/2024, FEV1 2.34 (70% predicted), DLCO 91% predicted  -Bronchoscopy performed 3/7/2024, transbronchial needle biopsy of the left upper lobe consistent with malignant adenocarcinoma of lung primary.  Brushings and lavage of the left upper lobe consistent with malignant adenocarcinoma.  -MRI of the brain performed 4/5/2024, demonstrates no evidence of metastatic disease.    Assessment/Plan:  68-year-old  male with a history of hypertension, hyperlipidemia, COPD and active tobacco abuse who presents with newly  diagnosed left upper lobe lung cancer.  I discussed with the patient the importance of smoking cessation in the setting of hypertension, COPD and lung cancer.  I agree with Dr. Schmitz's assessment that this represents a [id number] Clinical stage IIb malignancy.  Tentative plans will be for neoadjuvant chemotherapy followed by repeat imaging.  Assuming his imaging studies demonstrate a response to treatment, I discussed with the patient performing a bronchoscopy, robotic left upper lobectomy and mediastinal lymph node dissection.  The risks and benefits of surgery were discussed with the patient including pain, bleeding, infection, air leak, myocardial infarction and death.  The patient understood these risks and wished to proceed with the above plan.  I will have the patient return to clinic after he has completed chemotherapy and his repeat imaging to discuss these results.    Patient Active Problem List   Diagnosis    Hypertension    Tobacco use    Hyperlipidemia    COPD (chronic obstructive pulmonary disease)    Chronic pain    Arthritis    Anxiety    Dizziness    PVC (premature ventricular contraction)    Abnormal electrocardiogram (ECG) (EKG)    Incidental lung nodule, greater than or equal to 8mm    Lung nodule    NSCLC of left lung

## 2024-04-26 DIAGNOSIS — C34.92 NSCLC OF LEFT LUNG: Primary | ICD-10-CM

## 2024-04-26 RX ORDER — OLANZAPINE 5 MG/1
5 TABLET ORAL NIGHTLY
Qty: 4 TABLET | Refills: 2 | Status: SHIPPED | OUTPATIENT
Start: 2024-04-26

## 2024-04-26 RX ORDER — ONDANSETRON HYDROCHLORIDE 8 MG/1
8 TABLET, FILM COATED ORAL 3 TIMES DAILY PRN
Qty: 30 TABLET | Refills: 2 | Status: SHIPPED | OUTPATIENT
Start: 2024-04-26

## 2024-04-29 ENCOUNTER — OFFICE VISIT (OUTPATIENT)
Dept: ONCOLOGY | Facility: CLINIC | Age: 69
End: 2024-04-29
Payer: MEDICARE

## 2024-04-29 ENCOUNTER — HOSPITAL ENCOUNTER (OUTPATIENT)
Dept: ONCOLOGY | Facility: HOSPITAL | Age: 69
Discharge: HOME | End: 2024-04-29
Payer: MEDICARE

## 2024-04-29 ENCOUNTER — SPECIALTY PHARMACY (OUTPATIENT)
Dept: ONCOLOGY | Facility: HOSPITAL | Age: 69
End: 2024-04-29
Payer: MEDICARE

## 2024-04-29 VITALS
DIASTOLIC BLOOD PRESSURE: 70 MMHG | SYSTOLIC BLOOD PRESSURE: 107 MMHG | TEMPERATURE: 97.1 F | HEIGHT: 70 IN | RESPIRATION RATE: 16 BRPM | WEIGHT: 199 LBS | HEART RATE: 90 BPM | OXYGEN SATURATION: 97 % | BODY MASS INDEX: 28.49 KG/M2

## 2024-04-29 DIAGNOSIS — C34.92 NSCLC OF LEFT LUNG: Primary | ICD-10-CM

## 2024-04-29 DIAGNOSIS — C34.92 NSCLC OF LEFT LUNG: ICD-10-CM

## 2024-04-29 LAB
ALBUMIN SERPL-MCNC: 4.3 G/DL (ref 3.5–5.2)
ALBUMIN/GLOB SERPL: 1.8 G/DL
ALP SERPL-CCNC: 71 U/L (ref 39–117)
ALT SERPL W P-5'-P-CCNC: 26 U/L (ref 1–41)
ANION GAP SERPL CALCULATED.3IONS-SCNC: 11 MMOL/L (ref 5–15)
AST SERPL-CCNC: 22 U/L (ref 1–40)
BASOPHILS # BLD AUTO: 0.06 10*3/MM3 (ref 0–0.2)
BASOPHILS NFR BLD AUTO: 0.6 % (ref 0–1.5)
BILIRUB SERPL-MCNC: 0.6 MG/DL (ref 0–1.2)
BUN SERPL-MCNC: 20 MG/DL (ref 8–23)
BUN/CREAT SERPL: 23 (ref 7–25)
CALCIUM SPEC-SCNC: 9.3 MG/DL (ref 8.6–10.5)
CHLORIDE SERPL-SCNC: 102 MMOL/L (ref 98–107)
CO2 SERPL-SCNC: 28 MMOL/L (ref 22–29)
CREAT SERPL-MCNC: 0.87 MG/DL (ref 0.76–1.27)
DEPRECATED RDW RBC AUTO: 42.9 FL (ref 37–54)
EGFRCR SERPLBLD CKD-EPI 2021: 94 ML/MIN/1.73
EOSINOPHIL # BLD AUTO: 0.19 10*3/MM3 (ref 0–0.4)
EOSINOPHIL NFR BLD AUTO: 2 % (ref 0.3–6.2)
ERYTHROCYTE [DISTWIDTH] IN BLOOD BY AUTOMATED COUNT: 12.5 % (ref 12.3–15.4)
GLOBULIN UR ELPH-MCNC: 2.4 GM/DL
GLUCOSE SERPL-MCNC: 104 MG/DL (ref 65–99)
HCT VFR BLD AUTO: 47.1 % (ref 37.5–51)
HGB BLD-MCNC: 15.7 G/DL (ref 13–17.7)
IMM GRANULOCYTES # BLD AUTO: 0.06 10*3/MM3 (ref 0–0.05)
IMM GRANULOCYTES NFR BLD AUTO: 0.6 % (ref 0–0.5)
LYMPHOCYTES # BLD AUTO: 2.45 10*3/MM3 (ref 0.7–3.1)
LYMPHOCYTES NFR BLD AUTO: 26 % (ref 19.6–45.3)
MCH RBC QN AUTO: 31.1 PG (ref 26.6–33)
MCHC RBC AUTO-ENTMCNC: 33.3 G/DL (ref 31.5–35.7)
MCV RBC AUTO: 93.3 FL (ref 79–97)
MONOCYTES # BLD AUTO: 0.97 10*3/MM3 (ref 0.1–0.9)
MONOCYTES NFR BLD AUTO: 10.3 % (ref 5–12)
NEUTROPHILS NFR BLD AUTO: 5.68 10*3/MM3 (ref 1.7–7)
NEUTROPHILS NFR BLD AUTO: 60.5 % (ref 42.7–76)
NRBC BLD AUTO-RTO: 0 /100 WBC (ref 0–0.2)
PLATELET # BLD AUTO: 203 10*3/MM3 (ref 140–450)
PMV BLD AUTO: 10 FL (ref 6–12)
POTASSIUM SERPL-SCNC: 4.1 MMOL/L (ref 3.5–5.2)
PROT SERPL-MCNC: 6.7 G/DL (ref 6–8.5)
RBC # BLD AUTO: 5.05 10*6/MM3 (ref 4.14–5.8)
SODIUM SERPL-SCNC: 141 MMOL/L (ref 136–145)
T4 FREE SERPL-MCNC: 1.21 NG/DL (ref 0.92–1.68)
TSH SERPL DL<=0.05 MIU/L-ACNC: 1.97 UIU/ML (ref 0.27–4.2)
WBC NRBC COR # BLD AUTO: 9.41 10*3/MM3 (ref 3.4–10.8)

## 2024-04-29 PROCEDURE — 36415 COLL VENOUS BLD VENIPUNCTURE: CPT

## 2024-04-29 PROCEDURE — 80053 COMPREHEN METABOLIC PANEL: CPT | Performed by: INTERNAL MEDICINE

## 2024-04-29 PROCEDURE — 99214 OFFICE O/P EST MOD 30 MIN: CPT | Performed by: NURSE PRACTITIONER

## 2024-04-29 PROCEDURE — 84443 ASSAY THYROID STIM HORMONE: CPT | Performed by: INTERNAL MEDICINE

## 2024-04-29 PROCEDURE — 1126F AMNT PAIN NOTED NONE PRSNT: CPT | Performed by: NURSE PRACTITIONER

## 2024-04-29 PROCEDURE — 85025 COMPLETE CBC W/AUTO DIFF WBC: CPT | Performed by: INTERNAL MEDICINE

## 2024-04-29 PROCEDURE — 3078F DIAST BP <80 MM HG: CPT | Performed by: NURSE PRACTITIONER

## 2024-04-29 PROCEDURE — 84439 ASSAY OF FREE THYROXINE: CPT | Performed by: INTERNAL MEDICINE

## 2024-04-29 PROCEDURE — 3074F SYST BP LT 130 MM HG: CPT | Performed by: NURSE PRACTITIONER

## 2024-04-29 NOTE — PROGRESS NOTES
CHEMOTHERAPY PREPARATION    Juancarlos Espinal Jr.  [id number]  1955    Chief Complaint: Treatment preparation and needs assessment    History of present illness:  Juancarlos Espinal Jr. is a 68 y.o. year old male who is here today with his niece for treatment preparation and needs assessment.  The patient has been diagnosed with lung cancer and is scheduled to begin treatment with Nivolumab 360mg /  PACLitaxel / CARBOplatin.     Oncology History:    Oncology/Hematology History   NSCLC of left lung   4/9/2024 Initial Diagnosis    NSCLC of left lung     4/30/2024 -  Chemotherapy    OP LUNG  Nivolumab 360mg /  PACLitaxel / CARBOplatin AUC=5          The current medication list and allergy list were reviewed and reconciled.     Past Medical History, Past Surgical History, Social History, Family History have been reviewed and are without significant changes except as mentioned.    Review of Systems:    Review of Systems   Constitutional:  Negative for appetite change, fatigue, fever and unexpected weight change.   HENT:  Negative for mouth sores, sore throat and trouble swallowing.    Respiratory:  Negative for cough, shortness of breath and wheezing.    Cardiovascular:  Negative for chest pain, palpitations and leg swelling.   Gastrointestinal:  Negative for abdominal distention, abdominal pain, constipation, diarrhea, nausea and vomiting.   Genitourinary:  Negative for difficulty urinating, dysuria and frequency.   Musculoskeletal:  Negative for arthralgias.   Skin:  Negative for pallor, rash and wound.   Neurological:  Negative for dizziness and weakness.   Hematological:  Does not bruise/bleed easily.   Psychiatric/Behavioral:  Negative for confusion and sleep disturbance. The patient is not nervous/anxious.        Physical Exam:    Vitals:    04/29/24 1454   BP: 107/70   Pulse: 90   Resp: 16   Temp: 97.1 °F (36.2 °C)   SpO2: 97%     Vitals:    04/29/24 1454   PainSc: 0-No pain          ECOG: (0) Fully Active - Able  to Carry On All Pre-disease Performance Without Restriction    General: well appearing, in no acute distress    Psych: Mood is stable            NEEDS ASSESSMENTS    Genetics  The patient's new diagnosis and family history have been reviewed for genetic counseling needs. A genetic referral is not recommended.     Psychosocial  The patient has completed a PHQ-9 Depression Screening and the Distress Thermometer (DT) today.   PHQ-9 results show 0 (No Depression). The patient scored their distress today as 0 on a scale of 0-10 with 0 being no distress and 10 being extreme distress.   Problems marked by the patient as being an issue for them within the last week include practical problems and physical problems.   Results were reviewed along with psychosocial resources offered by our cancer center.  Our oncology social worker will be flagged for a DT score of 4 or above, and a same day call will be made for a score of 9 or 10.  A mental health referral is offered at this time. The patient is not interested in a referral to [facility].   Copies of patient's questionnaires will be scanned into EMR for details and further reference.    Barriers to care  A barriers form was also completed by the patient today. We discussed services offered by our facility to help him have adequate access to care. The patient was given the name and contact information for our Oncology Social Worker, Rocio Vale.  Based upon barriers assessment today, the patient will not require a follow-up call from the  to further discuss needs.   A copy of the barriers form will also be scanned into EMR for details and further reference.     VAD Assessment  The patient and I discussed planned intervenous chemotherapy as well as other IV treatments that are often needed throughout the course of treatment. These may include, but are not limited to blood transfusions, antibiotics, and IV hydration. The vasculature does appear to be  "adequate for multiple peripheral IVs throughout their treatment course.     Advanced Care Planning  The patient and I discussed advanced care planning, \"Conversations that Matter\".   This service was offered, free of charge, for development of advance directives with a certified ACP facilitator.  The patient does not have an up-to-date advanced directive. This document is not on file with our office. The patient is not interested in an appointment with one of our facilitators to create or update their advanced directives.      Palliative Care  The patient and I discussed palliative care services. Palliative care is not the same as Hospice care. This is specialized medical care for people living with serious illness with the goal of improving quality of life for the patient and their family. [facility] offers our patients outpatient palliative care early along with their treatment to assist in coordination of care, symptom management, pain management, and medical decision making.  Oncology criteria for palliative care referral is not met at this time. The patient is not interested in a palliative care consultation.     Additional Referral needs  none      CHEMOTHERAPY EDUCATION    Chemotherapy education will be completed and consent obtained per oncology pharmacist.  See their documentation for further details.    Booklets Given: Chemotherapy and You [x]  Nutrition for the Patient with Cancer During Treatment [x]    Sexuality/Fertility Books []     Chemotherapy Regimen:   Treatment Plans       Name Type Plan Dates Plan Provider         Active    OP LUNG  Nivolumab 360mg /  PACLitaxel / CARBOplatin AUC=5  ONCOLOGY TREATMENT  4/26/2024 - Present Akash Schmitz MD                      Assessment and Plan:    Diagnoses and all orders for this visit:    1. NSCLC of left lung (Primary)    The patient and I have reviewed their cancer diagnosis and scheduled treatment plan. Needs assessment was completed including genetics, " psychosocial needs, barriers to care, VAD evaluation, advanced care planning, and palliative care services. Referrals have been ordered as appropriate based upon our evaluation and patient desires.     Chemotherapy teaching will be completed today per pharmacy.  Adequate time was given to answer questions.  Patient and family are aware of their care team members and contact information if they have questions or problems throughout the treatment course. The patient is adequately prepared to begin treatment as scheduled tomorrow.     Reviewed with patient education regarding Zyprexa and Zofran prescriptions sent to pharmacy.       Patient will have pretreatment labs drawn today.    I spent 30 minutes caring for Juancarlos on this date of service. This time includes time spent by me in the following activities: preparing for the visit, obtaining and/or reviewing a separately obtained history, performing a medically appropriate examination and/or evaluation, counseling and educating the patient/family/caregiver, referring and communicating with other health care professionals, documenting information in the medical record, and care coordination.     Roxana Barnes, APRN  04/29/24

## 2024-04-29 NOTE — PROGRESS NOTES
Outpatient Infusion  [address] [place] [address]  [phone]      CHEMOTHERAPY EDUCATION    NAME:  Juancarlos Espinal      : 1955           DATE: 24    Medication Education Sheets: (select all that apply)  Carboplatin, Nivolumab, and Paclitaxel    Other Education Sheets: (select all that apply)  CINV, Diarrhea, HOPA Immune Checkpoint Inhibitors, Checkpoint Inhibitor Wallet Card, and Symptom Tracker Sheet and GISELA Information    Chemotherapy Regimen:   OP LUNG  Nivolumab 360mg /  PACLitaxel / CARBOplatin AUC=5  every 21 days  Planning 3 cycles, re-image, then surgery      TOPICS EDUCATION PROVIDED COMMENTS   ANEMIA:  role of RBC, cause, s/s, ways to manage, role of transfusion [x] Reviewed the role of RBC and the use of transfusions if hemoglobin decreases too much.  Patient to notify us if he experiences shortness of breath, dizziness, or palpitations.  Also let patient know he could feel more tired than usual and to try to stay active, but rest if he needs to.    THROMBOCYTOPENIA:  role of platelet, cause, s/s, ways to prevent bleeding, things to avoid, when to seek help [x] Reviewed the role of platelets in blood clotting and when to call clinic (bloody nose that bleeds for 5 minutes despite pressure, a cut that won't stop bleeding despite pressure, gums that bleed excessively with brushing or flossing). Recommended using an electric razor, soft bristle toothbrush, and blowing the nose gently.    NEUTROPENIA:  role of WBC, cause, infection precautions, s/s of infection, when to call MD [x] Reviewed the role of WBC, good infection prevention practices, and when to call the clinic (temperature 100.4F, sore throat, burning urination, etc)   NUTRITION & APPETITE CHANGES:  importance of maintaining healthy diet & weight, ways to manage to improve intake, dietary consult, exercise regimen, electrolyte and/or blood glucose abnormalities [x] Metallic Taste: Informed patient of  the potential for developing metallic taste and smell. Recommended flavoring water if it tastes metallic, using plastic utensils instead of metal utensils, and avoiding drinking from a metal can or cup to help mitigate this adverse effect.   DIARRHEA:  causes, s/s of dehydration, ways to manage, dietary changes, when to call MD [x] Both: Discussed the risk of diarrhea and immune related colitis. Can use OTC loperamide with diarrhea onset, but call the clinic if 4-6 episodes in 24 hours not relieved by OTC loperamide. Discussed the role of high-dose steroids for the treatment of immune related colitis.    NAUSEA & VOMITING:  cause, use of antiemetics, dietary changes, when to call MD [x] Emetic risk: High  Premeds: Dexamethasone, Fosaprepitant, and Palonosetron  Scheduled home meds: Olanzapine 5 mg by mouth at night on days 1-4 after chemotherapy to prevent delayed nausea  PRN home meds: Ondansetron 8 mg PO TID PRN N/V  Pharmacy home meds sent to: [facility] [place]    Instructed the patient to take the scheduled olanzapine even if he does not feel nauseous.  I explained this is to prevent delayed nausea.    Instructed the patient to take a dose of the PRN medication at the first onset of nausea and if it's not working to call us for additional medications.  Also provided non-drug measures to mitigate nausea.   MOUTH SORES:  causes, oral care, ways to manage [x] Mouth sores can be prevented by making a mouth wash mixture of salt, baking soda, and water. The patient was instructed to swish and spit four times daily after meals and before bedtime. Also recommended using a soft bristle toothbrush and avoiding alcohol-containing OTC mouthwashes.    ALOPECIA:  cause, ways to manage, resources [x] Discussed the possibility of hair loss with the patient. Recommended covering the head with a hat and/or protecting the skin on the head with SPF 30 or higher.    NERVOUS SYSTEM CHANGES:  causes, s/s, neuropathies,  cognitive changes, ways to manage [x] discussed the adverse effect of peripheral neuropathy and signs/symptoms associated with this adverse effect. Instructed patient to call if symptoms become more frequent or worsen.    PAIN:  causes, ways to manage [x] Chemo: Discussed muscle and joint aches/pains with chemotherapy, and recommended the use of OTC pain relief with ibuprofen or acetaminophen if needed.   SKIN & NAIL CHANGES:  cause, s/s, ways to manage [x] Both: Discussed the potential for a rash or itchy skin from immunotherapy, offered nonpharmacologic prevention strategies, and instructed the patient to call if a rash develops and worsens. Informed the patient of the possibility for dark or white lines on finger and toenails during treatment, and that nails may become brittle and even fall off in extreme cases. This will likely reverse after treatment concludes.    ORGAN TOXICITIES:  cause, s/s, need for diagnostic tests, labs, when to notify MD [x] Discussed potential effects on organ systems, monitoring, diagnostic tests, labs, and when to notify the clinic. Discussed the signs/symptoms of the following: cardiotoxicity, central neurotoxicity (confusion, vision changes, stiff neck, seizure), hepatotoxicity, hormone gland changes (most commonly the thyroid), lung changes, and nephrotoxicity.   INFUSION RELATED REACTIONS or INJECTION-SITE REACTION:  Cause, s/s, anaphylaxis, monitoring, etc. [x] Premeds: Dexamethasone, Diphenhydramine, and Famotidine    Discussed the risk of an infusion reaction and symptoms such as: fever, chills, dizziness, itchiness or rash, flushing, trouble breathing, wheezing, sudden back pain, or feeling faint.  Instructed the patient to notify his nurse if he starts feeling weird at any point during his infusion.    Reviewed how infusion reactions are managed.   MISCELLANEOUS:  drug interactions, administration, labs, etc. [x] Discussed chemotherapy schedule, lab draws, infusion times, and  total expected visit time.   DDIs: Olanzapine, Norco, cetirizine, clonidine, gabapentin are CNS depressants. It's okay to use olanzapine at this low dose and for a short duration - patient to call clinic for excess fatigue, blurry vision, slowed/impaired thinking.    Olanzapine, clonazepam, clonidine, lisinopril, hydrochlorothiazide can lower blood pressure. It's okay to use olanzapine at this low dose and for a short duration - patient to call the clinic if he feels dizzy or faints.     Drug-food interactions: None  Lab draws: On or before day 1 of each cycle, no sooner than 3 days early.   INFERTILITY & SEXUALITY:    causes, fertility preservation options, sexuality changes, ways to manage, importance of birth control [x] IV Oncology Therapy: Reviewed safe sex practices and the importance of minimizing exposure to body fluids for 48 hours after each dose of IV oncology therapy., The patient is not sexually active with a woman of childbearing potential.   HOME CARE:  storing of oral chemo, how to manage bodily fluids [x] IV - Counseled on management of soiled linens and proper flush technique.  Discussed how to manage all the side effects at home and advised when to contact the MD office     Medications:  Prior to Admission medications    Medication Sig Start Date End Date Taking? Authorizing Provider   aspirin 81 MG EC tablet Take 1 tablet by mouth Daily. 9/11/17   Rigobreto Moore PA-C   atorvastatin (LIPITOR) 80 MG tablet Take 1 tablet by mouth Daily. 3/8/23   Ronald Taylor MD   cetirizine (zyrTEC) 10 MG tablet Take 1 tablet by mouth Daily. 3/11/24   ProviderSheryl MD   clonazePAM (KlonoPIN) 1 MG tablet Take 1 tablet by mouth 2 (Two) Times a Day As Needed for Seizures.    ProviderSheryl MD   CloNIDine (CATAPRES) 0.1 MG tablet Take 1 tablet by mouth 3 (Three) Times a Day As Needed for High Blood Pressure (If your blood pressure is over 180). 2/25/19   Dioni Mike MD   fluticasone  (FLONASE) 50 MCG/ACT nasal spray 2 sprays by Each Nare route Daily. 3/11/24   Sheryl Ly MD   gabapentin (NEURONTIN) 300 MG capsule Take 1 capsule by mouth Every Night. 7/1/21   Kimberly Randall MD   HYDROcodone-acetaminophen (NORCO)  MG per tablet Take 1 tablet by mouth 3 (Three) Times a Day As Needed for Pain. 8/30/17   Sheryl Ly MD   lisinopril-hydrochlorothiazide (PRINZIDE,ZESTORETIC) 20-25 MG per tablet Take 1 tablet by mouth Daily. 8/29/17   Sheryl Ly MD   meloxicam (MOBIC) 15 MG tablet Take 1 tablet by mouth Daily. 1/5/20   Sheryl Ly MD   montelukast (SINGULAIR) 10 MG tablet Take 1 tablet by mouth Daily. 8/29/17   Sheryl Ly MD   OLANZapine (zyPREXA) 5 MG tablet Take 1 tablet by mouth Every Night. Take nightly x 4 starting night of chemotherapy. 4/26/24   Akash Schmitz MD   ondansetron (ZOFRAN) 8 MG tablet Take 1 tablet by mouth 3 (Three) Times a Day As Needed for Nausea or Vomiting. 4/26/24   Akash Schmitz MD   tiZANidine (ZANAFLEX) 4 MG tablet Take 1 tablet by mouth Every 8 (Eight) Hours As Needed. 3/11/24   Sheryl Ly MD       Notes: All questions and concerns were addressed. Provided a personalized treatment calendar to patient (includes treatment and lab schedule). Provided patient with contact information for the pharmacist and clinic while instructing them to call if any questions or concerns arise. Informed consent for treatment was obtained. Patient was receptive to information and expressed understanding.     Rocio Barnes, PharmD, [facility]  Oncology Clinical Pharmacist   Phone: [phone]    4/29/2024  15:06 EDT

## 2024-04-30 ENCOUNTER — HOSPITAL ENCOUNTER (OUTPATIENT)
Dept: ONCOLOGY | Facility: HOSPITAL | Age: 69
Discharge: HOME | End: 2024-04-30
Admitting: INTERNAL MEDICINE
Payer: MEDICARE

## 2024-04-30 ENCOUNTER — OFFICE VISIT (OUTPATIENT)
Dept: ONCOLOGY | Facility: CLINIC | Age: 69
End: 2024-04-30
Payer: MEDICARE

## 2024-04-30 ENCOUNTER — DOCUMENTATION (OUTPATIENT)
Dept: NUTRITION | Facility: HOSPITAL | Age: 69
End: 2024-04-30
Payer: MEDICARE

## 2024-04-30 VITALS
RESPIRATION RATE: 18 BRPM | SYSTOLIC BLOOD PRESSURE: 139 MMHG | BODY MASS INDEX: 28.63 KG/M2 | OXYGEN SATURATION: 95 % | WEIGHT: 200 LBS | HEIGHT: 70 IN | TEMPERATURE: 98.9 F | HEART RATE: 83 BPM | DIASTOLIC BLOOD PRESSURE: 80 MMHG

## 2024-04-30 VITALS — DIASTOLIC BLOOD PRESSURE: 68 MMHG | SYSTOLIC BLOOD PRESSURE: 120 MMHG | HEART RATE: 62 BPM

## 2024-04-30 DIAGNOSIS — C34.92 NSCLC OF LEFT LUNG: Primary | ICD-10-CM

## 2024-04-30 PROCEDURE — 25810000003 SODIUM CHLORIDE 0.9 % SOLUTION: Performed by: INTERNAL MEDICINE

## 2024-04-30 PROCEDURE — 25010000002 PACLITAXEL PER 1 MG: Performed by: INTERNAL MEDICINE

## 2024-04-30 PROCEDURE — 25010000002 DEXAMETHASONE SODIUM PHOSPHATE 100 MG/10ML SOLUTION: Performed by: INTERNAL MEDICINE

## 2024-04-30 PROCEDURE — 96417 CHEMO IV INFUS EACH ADDL SEQ: CPT

## 2024-04-30 PROCEDURE — 3075F SYST BP GE 130 - 139MM HG: CPT | Performed by: INTERNAL MEDICINE

## 2024-04-30 PROCEDURE — 25810000003 SODIUM CHLORIDE 0.9 % SOLUTION 500 ML FLEX CONT: Performed by: INTERNAL MEDICINE

## 2024-04-30 PROCEDURE — 3079F DIAST BP 80-89 MM HG: CPT | Performed by: INTERNAL MEDICINE

## 2024-04-30 PROCEDURE — 96367 TX/PROPH/DG ADDL SEQ IV INF: CPT

## 2024-04-30 PROCEDURE — 25010000002 DIPHENHYDRAMINE PER 50 MG: Performed by: INTERNAL MEDICINE

## 2024-04-30 PROCEDURE — 25010000002 NIVOLUMAB 40 MG/4ML SOLUTION 4 ML VIAL: Performed by: INTERNAL MEDICINE

## 2024-04-30 PROCEDURE — 25810000003 SODIUM CHLORIDE 0.9 % SOLUTION 250 ML FLEX CONT: Performed by: INTERNAL MEDICINE

## 2024-04-30 PROCEDURE — 25010000002 NIVOLUMAB 240 MG/24ML SOLUTION 24 ML VIAL: Performed by: INTERNAL MEDICINE

## 2024-04-30 PROCEDURE — 96366 THER/PROPH/DIAG IV INF ADDON: CPT

## 2024-04-30 PROCEDURE — 25010000002 FOSAPREPITANT PER 1 MG: Performed by: INTERNAL MEDICINE

## 2024-04-30 PROCEDURE — 96415 CHEMO IV INFUSION ADDL HR: CPT

## 2024-04-30 PROCEDURE — 25010000002 CARBOPLATIN PER 50 MG: Performed by: INTERNAL MEDICINE

## 2024-04-30 PROCEDURE — 1126F AMNT PAIN NOTED NONE PRSNT: CPT | Performed by: INTERNAL MEDICINE

## 2024-04-30 PROCEDURE — 96413 CHEMO IV INFUSION 1 HR: CPT

## 2024-04-30 PROCEDURE — 99214 OFFICE O/P EST MOD 30 MIN: CPT | Performed by: INTERNAL MEDICINE

## 2024-04-30 PROCEDURE — 96375 TX/PRO/DX INJ NEW DRUG ADDON: CPT

## 2024-04-30 PROCEDURE — 25010000002 PALONOSETRON PER 25 MCG: Performed by: INTERNAL MEDICINE

## 2024-04-30 RX ORDER — DIPHENHYDRAMINE HYDROCHLORIDE 50 MG/ML
50 INJECTION INTRAMUSCULAR; INTRAVENOUS AS NEEDED
OUTPATIENT
Start: 2024-05-21

## 2024-04-30 RX ORDER — SODIUM CHLORIDE 9 MG/ML
20 INJECTION, SOLUTION INTRAVENOUS ONCE
Status: COMPLETED | OUTPATIENT
Start: 2024-04-30 | End: 2024-04-30

## 2024-04-30 RX ORDER — FAMOTIDINE 10 MG/ML
20 INJECTION, SOLUTION INTRAVENOUS ONCE
OUTPATIENT
Start: 2024-05-21

## 2024-04-30 RX ORDER — PALONOSETRON 0.05 MG/ML
0.25 INJECTION, SOLUTION INTRAVENOUS ONCE
Status: COMPLETED | OUTPATIENT
Start: 2024-04-30 | End: 2024-04-30

## 2024-04-30 RX ORDER — FAMOTIDINE 10 MG/ML
20 INJECTION, SOLUTION INTRAVENOUS AS NEEDED
OUTPATIENT
Start: 2024-05-21

## 2024-04-30 RX ORDER — PALONOSETRON 0.05 MG/ML
0.25 INJECTION, SOLUTION INTRAVENOUS ONCE
OUTPATIENT
Start: 2024-05-21

## 2024-04-30 RX ORDER — SODIUM CHLORIDE 9 MG/ML
20 INJECTION, SOLUTION INTRAVENOUS ONCE
OUTPATIENT
Start: 2024-05-21

## 2024-04-30 RX ORDER — FAMOTIDINE 10 MG/ML
20 INJECTION, SOLUTION INTRAVENOUS ONCE
Status: COMPLETED | OUTPATIENT
Start: 2024-04-30 | End: 2024-04-30

## 2024-04-30 RX ADMIN — FAMOTIDINE 20 MG: 10 INJECTION, SOLUTION INTRAVENOUS at 10:29

## 2024-04-30 RX ADMIN — SODIUM CHLORIDE 20 ML/HR: 9 INJECTION, SOLUTION INTRAVENOUS at 09:43

## 2024-04-30 RX ADMIN — DIPHENHYDRAMINE HYDROCHLORIDE 50 MG: 50 INJECTION INTRAMUSCULAR; INTRAVENOUS at 11:07

## 2024-04-30 RX ADMIN — FOSAPREPITANT 150 MG: 150 INJECTION, POWDER, LYOPHILIZED, FOR SOLUTION INTRAVENOUS at 10:30

## 2024-04-30 RX ADMIN — PACLITAXEL 365 MG: 6 INJECTION, SOLUTION INTRAVENOUS at 11:31

## 2024-04-30 RX ADMIN — DEXAMETHASONE SODIUM PHOSPHATE 20 MG: 10 INJECTION, SOLUTION INTRAMUSCULAR; INTRAVENOUS at 10:33

## 2024-04-30 RX ADMIN — PALONOSETRON HYDROCHLORIDE 0.25 MG: 0.25 INJECTION INTRAVENOUS at 10:29

## 2024-04-30 RX ADMIN — SODIUM CHLORIDE 360 MG: 9 INJECTION, SOLUTION INTRAVENOUS at 09:45

## 2024-04-30 RX ADMIN — CARBOPLATIN 650 MG: 10 INJECTION, SOLUTION INTRAVENOUS at 14:58

## 2024-04-30 NOTE — PROGRESS NOTES
Follow Up Office Visit      Date: 2024     Patient Name: Juancarlos Espinal Jr.  MRN: [id number]  : 1955  Referring Physician: Macarena Mccartney     Chief Complaint: Follow-up for left lung NSCLC-adenocarcinoma     History of Present Illness: Juancarlos Espinal Jr. is a pleasant 68 y.o. male with a past medical history of tobacco abuse, hypertension, hyperlipidemia, anxiety who presents today for evaluation of left lung NSCLC. The patient underwent a low-dose screening CT scan in 2023 which was notable for a 1.2 cm left apical nodule.  He then underwent a PET/CT in 2024 which showed a 1.4 cm lesion in the left upper lobe as well as some concerning left hilar adenopathy.  He was seen by Dr. Mccartney who performed a bronchoscopy with biopsy consistent with a moderately differentiated adenocarcinoma.  Unfortunately the lymph nodes were too difficult to sample due to their location.  MRI brain was without evidence of malignancy.  Today he is doing well.  Denies any chest pain or shortness of breath.  Denies any cough, fevers, chills, weight loss    Interval History:  Presents clinic for follow-up.  Scheduled begin cycle 1 of neoadjuvant carboplatin/Taxol/nivolumab.  Anxious about starting treatment but otherwise doing well.    Oncology History:    Oncology/Hematology History   NSCLC of left lung   2024 Initial Diagnosis    NSCLC of left lung     2024 -  Chemotherapy    OP LUNG  Nivolumab 360mg /  PACLitaxel / CARBOplatin AUC=5          Subjective      Review of Systems:   Constitutional: Negative for fevers, chills, or weight loss  Eyes: Negative for blurred vision or discharge         Ear/Nose/Throat: Negative for difficulty swallowing, sore throat, LAD                                                       Respiratory: Negative for cough, SOA, wheezing                                                                                        Cardiovascular: Negative for chest pain or  palpitations                                                                  Gastrointestinal: Negative for nausea, vomiting or diarrhea                                                                     Genitourinary: Negative for dysuria or hematuria                                                                                           Musculoskeletal: Negative for any joint pains or muscle aches                                                                        Neurologic: Negative for any weakness, headaches, dizziness                                                                         Hematologic: Negative for any easy bleeding or bruising                                                                                   Psychiatric: Negative for anxiety or depression                          Past Medical History/Past Surgical History/ Family History/ Social History: Reviewed by me and unchanged from my previous documentation done on April 2024.     Medications:     Current Outpatient Medications:     aspirin 81 MG EC tablet, Take 1 tablet by mouth Daily., Disp: 30 tablet, Rfl: 0    atorvastatin (LIPITOR) 80 MG tablet, Take 1 tablet by mouth Daily., Disp: 90 tablet, Rfl: 0    cetirizine (zyrTEC) 10 MG tablet, Take 1 tablet by mouth Daily., Disp: , Rfl:     clonazePAM (KlonoPIN) 1 MG tablet, Take 1 tablet by mouth 2 (Two) Times a Day As Needed for Seizures., Disp: , Rfl:     CloNIDine (CATAPRES) 0.1 MG tablet, Take 1 tablet by mouth 3 (Three) Times a Day As Needed for High Blood Pressure (If your blood pressure is over 180)., Disp: 30 tablet, Rfl: 0    fluticasone (FLONASE) 50 MCG/ACT nasal spray, 2 sprays by Each Nare route Daily., Disp: , Rfl:     gabapentin (NEURONTIN) 300 MG capsule, Take 1 capsule by mouth Every Night., Disp: 30 capsule, Rfl: 2    HYDROcodone-acetaminophen (NORCO)  MG per tablet, Take 1 tablet by mouth 3 (Three) Times a Day As Needed for Pain., Disp: , Rfl: 0     "lisinopril-hydrochlorothiazide (PRINZIDE,ZESTORETIC) 20-25 MG per tablet, Take 1 tablet by mouth Daily., Disp: , Rfl: 0    meloxicam (MOBIC) 15 MG tablet, Take 1 tablet by mouth Daily., Disp: , Rfl:     montelukast (SINGULAIR) 10 MG tablet, Take 1 tablet by mouth Daily., Disp: , Rfl: 0    OLANZapine (zyPREXA) 5 MG tablet, Take 1 tablet by mouth Every Night. Take nightly x 4 starting night of chemotherapy., Disp: 4 tablet, Rfl: 2    ondansetron (ZOFRAN) 8 MG tablet, Take 1 tablet by mouth 3 (Three) Times a Day As Needed for Nausea or Vomiting., Disp: 30 tablet, Rfl: 2    tiZANidine (ZANAFLEX) 4 MG tablet, Take 1 tablet by mouth Every 8 (Eight) Hours As Needed., Disp: , Rfl:     Allergies:   No Known Allergies    Objective     Physical Exam:  Vital Signs:   Vitals:    04/30/24 0813   BP: 139/80   Pulse: 83   Resp: 18   Temp: 98.9 °F (37.2 °C)   SpO2: 95%   Weight: 90.7 kg (200 lb)   Height: 177.8 cm (70\")   PainSc: 0-No pain     Pain Score    04/30/24 0813   PainSc: 0-No pain     ECOG Performance Status: 0 - Asymptomatic    Constitutional: NAD, ECOG 0  Eyes: PERRLA, scleral anicteric  ENT: No LAD, no thyromegaly  Respiratory: CTAB, no wheezing, rales, rhonchi  Cardiovascular: RRR, no murmurs, pulses 2+ bilaterally  Abdomen: soft, NT/ND, no HSM  Musculoskeletal: strength 5/5 bilaterally, no c/c/e  Neurologic: A&O x 3, CN II-XII intact grossly    Results Review:   Hospital Outpatient Visit on 04/29/2024   Component Date Value Ref Range Status    Glucose 04/29/2024 104 (H)  65 - 99 mg/dL Final    BUN 04/29/2024 20  8 - 23 mg/dL Final    Creatinine 04/29/2024 0.87  0.76 - 1.27 mg/dL Final    Sodium 04/29/2024 141  136 - 145 mmol/L Final    Potassium 04/29/2024 4.1  3.5 - 5.2 mmol/L Final    Slight hemolysis detected by analyzer. Result may be falsely elevated.    Chloride 04/29/2024 102  98 - 107 mmol/L Final    CO2 04/29/2024 28.0  22.0 - 29.0 mmol/L Final    Calcium 04/29/2024 9.3  8.6 - 10.5 mg/dL Final    Total " Protein 04/29/2024 6.7  6.0 - 8.5 g/dL Final    Albumin 04/29/2024 4.3  3.5 - 5.2 g/dL Final    ALT (SGPT) 04/29/2024 26  1 - 41 U/L Final    AST (SGOT) 04/29/2024 22  1 - 40 U/L Final    Alkaline Phosphatase 04/29/2024 71  39 - 117 U/L Final    Total Bilirubin 04/29/2024 0.6  0.0 - 1.2 mg/dL Final    Globulin 04/29/2024 2.4  gm/dL Final    Calculated Result    A/G Ratio 04/29/2024 1.8  g/dL Final    BUN/Creatinine Ratio 04/29/2024 23.0  7.0 - 25.0 Final    Anion Gap 04/29/2024 11.0  5.0 - 15.0 mmol/L Final    eGFR 04/29/2024 94.0  >60.0 mL/min/1.73 Final    TSH 04/29/2024 1.970  0.270 - 4.200 uIU/mL Final    Free T4 04/29/2024 1.21  0.92 - 1.68 ng/dL Final    WBC 04/29/2024 9.41  3.40 - 10.80 10*3/mm3 Final    RBC 04/29/2024 5.05  4.14 - 5.80 10*6/mm3 Final    Hemoglobin 04/29/2024 15.7  13.0 - 17.7 g/dL Final    Hematocrit 04/29/2024 47.1  37.5 - 51.0 % Final    MCV 04/29/2024 93.3  79.0 - 97.0 fL Final    MCH 04/29/2024 31.1  26.6 - 33.0 pg Final    MCHC 04/29/2024 33.3  31.5 - 35.7 g/dL Final    RDW 04/29/2024 12.5  12.3 - 15.4 % Final    RDW-SD 04/29/2024 42.9  37.0 - 54.0 fl Final    MPV 04/29/2024 10.0  6.0 - 12.0 fL Final    Platelets 04/29/2024 203  140 - 450 10*3/mm3 Final    Neutrophil % 04/29/2024 60.5  42.7 - 76.0 % Final    Lymphocyte % 04/29/2024 26.0  19.6 - 45.3 % Final    Monocyte % 04/29/2024 10.3  5.0 - 12.0 % Final    Eosinophil % 04/29/2024 2.0  0.3 - 6.2 % Final    Basophil % 04/29/2024 0.6  0.0 - 1.5 % Final    Immature Grans % 04/29/2024 0.6 (H)  0.0 - 0.5 % Final    Neutrophils, Absolute 04/29/2024 5.68  1.70 - 7.00 10*3/mm3 Final    Lymphocytes, Absolute 04/29/2024 2.45  0.70 - 3.10 10*3/mm3 Final    Monocytes, Absolute 04/29/2024 0.97 (H)  0.10 - 0.90 10*3/mm3 Final    Eosinophils, Absolute 04/29/2024 0.19  0.00 - 0.40 10*3/mm3 Final    Basophils, Absolute 04/29/2024 0.06  0.00 - 0.20 10*3/mm3 Final    Immature Grans, Absolute 04/29/2024 0.06 (H)  0.00 - 0.05 10*3/mm3 Final    nRBC  04/29/2024 0.0  0.0 - 0.2 /100 WBC Final       MRI Brain With & Without Contrast    Result Date: 4/6/2024  Narrative: MRI BRAIN W WO CONTRAST Date of Exam: 4/5/2024 4:28 PM EDT Indication: Non-small cell lung cancer (NSCLC), staging.  Comparison: 2/24/2019. Technique:  Routine multiplanar/multisequence sequence images of the brain were obtained before and after the uneventful administration of 13 Multihance. Findings: No acute infarct is present on diffusion weighted sequences. Age-related changes are present with minimal volume loss and mild typical T2 hyperintense subcortical and pontine white matter changes, nonspecific but favored to reflect sequela of chronic microvascular ischemia. There is no evidence of hemorrhage, mass or mass effect otherwise. There is no abnormal brain parenchymal enhancement. The orbits are normal. The paranasal sinuses are clear. Intracranial arterial flow voids are maintained. The ventricles are normal in size and configuration.     Impression: Impression: No evidence of intracranial metastatic disease. Essentially normal contrast-enhanced MRI of the brain as above. Electronically Signed: Mejia Gaytan MD  4/6/2024 1:27 PM EDT  Workstation ID: [id number]     Assessment / Plan      Assessment/Plan:   1. NSCLC of left lung (Primary)  -Initially noted on screening CT scan in October 2023  -PET/CT in February 2024 notable for 1.4 cm left upper lobe lesion with some concerning left hilar adenopathy  -MRI brain in April 2024 without evidence of metastatic disease  -Biopsy March 2024 consistent with a moderately differentiated adenocarcinoma.  PD-L1 less than 1%  -PFTs adequate for surgical intervention in February 2024  -Clinical stage IIB (T1b,N1,M0)  -Scheduled to begin cycle 1 of carboplatin/Taxol/nivolumab.  Clinically appropriate for treatment.  Labs reviewed from 4/29/2024 and appropriate for treatment         Follow Up:   Follow-up in 3 weeks     Akash Schmitz MD  Hematology and  Oncology     Please note that portions of this note may have been completed with a voice recognition program. Efforts were made to edit the dictations, but occasionally words are mistranscribed.

## 2024-04-30 NOTE — PROGRESS NOTES
"Outpatient Oncology Nutrition     Reason for Visit: Oncology Nutrition Screening and Patient Education / Met with patient during his initial chemotherapy infusion appointment.     Patient Name:  Juancarlos Espinal Jr.    :  1955    MRN:  [id number]    Date of Encounter: 2024    Nutrition Assessment     Diagnosis: left lung NSCLC-adenocarcinoma     Neoadjuvant Chemotherapy: OP LUNG Nivolumab 360mg / PACLitaxel / CARBOplatin AUC=5 - every 21 days x 3 cycles     Surgery: once neoadjuvant chemo completed     Patient Active Problem List:    Patient Active Problem List   Diagnosis    Hypertension    Tobacco use    Hyperlipidemia    COPD (chronic obstructive pulmonary disease)    Chronic pain    Arthritis    Anxiety    Dizziness    PVC (premature ventricular contraction)    Abnormal electrocardiogram (ECG) (EKG)    Incidental lung nodule, greater than or equal to 8mm    Lung nodule    NSCLC of left lung       Food / Nutrition Related History       Hydration Status   Discussed the importance of hydration, reviewed hydrating fluids, and recommended he continue with his current intake.    Goal: 96 ounces     How many 8 ounces glasses of water do you consume per day? 12    Enteral Feeding       Anthropometric Measurements     Height:    Ht Readings from Last 1 Encounters:   24 177.8 cm (70\")       Weight:    Wt Readings from Last 1 Encounters:   24 90.7 kg (200 lb)       BMI:  28.7 - Overweight    Weight Change: stable weight range past 2+ years    Review of Lab Data (Time Frame - 1 month / 2 month)   Labs reviewed - 24     Medication Review   MAR reviewed     Nutrition Focused Physical Findings       Nutrition Impact Symptoms   No problems with eating     Physical Activity   Normal with no limitations    Current Nutritional Intake     Oral diet:  Regular     Intake: oral intake has been normal     Malnutrition Risk Assessment     Recent weight loss over the past 6 months:  0 = No    Eating poorly " "because of a decreased appetite:  0 = No    Malnutrition Screening Score:     MST = 0 or 1 Patient not at risk for malnutrition    Nutrition Diagnosis     Problem    Etiology    Signs / Symptoms      Nutrition Intervention   Discussed the importance of good nutrition during his treatment course focusing on adequate calorie, protein, nutrient and fluid intake.  Advised him to be consuming smaller more frequent meals/snacks throughout the day to aid with potential nausea management.  Emphasized the importance of protein and its role in the diet; reviewed high protein foods; and recommended he have a protein source at each meal/snack.  Offered several easy to prepare high protein snack ideas he may find more appealing at this time.  Briefly reviewed the basics and importance of proper food safety during treatment.  Provided and reviewed written diet material \"Soft and Moist High Protein Menu Ideas\" to reinforce information discussed.      Goal   To achieve adequate nutritional and hydration intake.  To aid with nutrition impact symptom management as needed.     Monitoring / Evaluation   Answered his questions and he voiced understanding of information discussed.  RD's contact information provided and encouraged to call with questions.  Will follow up as indicated.     Samina Duran MS, RD, LD   "

## 2024-05-08 ENCOUNTER — TELEPHONE (OUTPATIENT)
Dept: ONCOLOGY | Facility: CLINIC | Age: 69
End: 2024-05-08
Payer: MEDICARE

## 2024-05-09 ENCOUNTER — TELEPHONE (OUTPATIENT)
Dept: ONCOLOGY | Facility: CLINIC | Age: 69
End: 2024-05-09
Payer: MEDICARE

## 2024-05-17 ENCOUNTER — TELEPHONE (OUTPATIENT)
Dept: ONCOLOGY | Facility: CLINIC | Age: 69
End: 2024-05-17
Payer: MEDICARE

## 2024-05-17 NOTE — TELEPHONE ENCOUNTER
I called patient to ask about his rash and he said that it had affected his legs and arms but was some better now.  He said that he has had this about a week and I advised that he could use hydrocortisone cream and benadryl per Dr. Davies (on call) and told him he might want to use the benadryl at night as it can cause drowsiness.  He has scheduled appt with dr Schmitz Monday and will be evaluated then.  He verbalized appreciation for the call.

## 2024-05-17 NOTE — TELEPHONE ENCOUNTER
Beth with [facility] called patient called them, and she is calling to relay this message patient states he has a rash on his legs, and arms which started after chemo. Please call the patient.

## 2024-05-20 ENCOUNTER — HOSPITAL ENCOUNTER (OUTPATIENT)
Dept: ONCOLOGY | Facility: HOSPITAL | Age: 69
Discharge: HOME | End: 2024-05-20
Admitting: INTERNAL MEDICINE
Payer: MEDICARE

## 2024-05-20 ENCOUNTER — OFFICE VISIT (OUTPATIENT)
Dept: ONCOLOGY | Facility: CLINIC | Age: 69
End: 2024-05-20
Payer: MEDICARE

## 2024-05-20 ENCOUNTER — APPOINTMENT (OUTPATIENT)
Dept: ONCOLOGY | Facility: HOSPITAL | Age: 69
End: 2024-05-20
Payer: MEDICARE

## 2024-05-20 VITALS
HEART RATE: 76 BPM | DIASTOLIC BLOOD PRESSURE: 80 MMHG | TEMPERATURE: 97.5 F | SYSTOLIC BLOOD PRESSURE: 122 MMHG | WEIGHT: 203 LBS | OXYGEN SATURATION: 98 % | BODY MASS INDEX: 29.06 KG/M2 | HEIGHT: 70 IN

## 2024-05-20 VITALS — DIASTOLIC BLOOD PRESSURE: 81 MMHG | HEART RATE: 59 BPM | SYSTOLIC BLOOD PRESSURE: 141 MMHG

## 2024-05-20 DIAGNOSIS — C34.92 NSCLC OF LEFT LUNG: Primary | ICD-10-CM

## 2024-05-20 LAB
ALBUMIN SERPL-MCNC: 3.9 G/DL (ref 3.5–5.2)
ALBUMIN/GLOB SERPL: 1.7 G/DL
ALP SERPL-CCNC: 69 U/L (ref 39–117)
ALT SERPL W P-5'-P-CCNC: 27 U/L (ref 1–41)
ANION GAP SERPL CALCULATED.3IONS-SCNC: 13 MMOL/L (ref 5–15)
AST SERPL-CCNC: 24 U/L (ref 1–40)
BASOPHILS # BLD AUTO: 0.04 10*3/MM3 (ref 0–0.2)
BASOPHILS NFR BLD AUTO: 0.5 % (ref 0–1.5)
BILIRUB SERPL-MCNC: 0.4 MG/DL (ref 0–1.2)
BUN SERPL-MCNC: 18 MG/DL (ref 8–23)
BUN/CREAT SERPL: 18.4 (ref 7–25)
CALCIUM SPEC-SCNC: 8.7 MG/DL (ref 8.6–10.5)
CHLORIDE SERPL-SCNC: 100 MMOL/L (ref 98–107)
CO2 SERPL-SCNC: 25 MMOL/L (ref 22–29)
CREAT SERPL-MCNC: 0.98 MG/DL (ref 0.76–1.27)
DEPRECATED RDW RBC AUTO: 44.3 FL (ref 37–54)
EGFRCR SERPLBLD CKD-EPI 2021: 84 ML/MIN/1.73
EOSINOPHIL # BLD AUTO: 0.23 10*3/MM3 (ref 0–0.4)
EOSINOPHIL NFR BLD AUTO: 2.9 % (ref 0.3–6.2)
ERYTHROCYTE [DISTWIDTH] IN BLOOD BY AUTOMATED COUNT: 13 % (ref 12.3–15.4)
GLOBULIN UR ELPH-MCNC: 2.3 GM/DL
GLUCOSE SERPL-MCNC: 103 MG/DL (ref 65–99)
HCT VFR BLD AUTO: 41.6 % (ref 37.5–51)
HGB BLD-MCNC: 14.2 G/DL (ref 13–17.7)
IMM GRANULOCYTES # BLD AUTO: 0.09 10*3/MM3 (ref 0–0.05)
IMM GRANULOCYTES NFR BLD AUTO: 1.1 % (ref 0–0.5)
LYMPHOCYTES # BLD AUTO: 1.59 10*3/MM3 (ref 0.7–3.1)
LYMPHOCYTES NFR BLD AUTO: 20 % (ref 19.6–45.3)
MCH RBC QN AUTO: 31.6 PG (ref 26.6–33)
MCHC RBC AUTO-ENTMCNC: 34.1 G/DL (ref 31.5–35.7)
MCV RBC AUTO: 92.4 FL (ref 79–97)
MONOCYTES # BLD AUTO: 0.96 10*3/MM3 (ref 0.1–0.9)
MONOCYTES NFR BLD AUTO: 12.1 % (ref 5–12)
NEUTROPHILS NFR BLD AUTO: 5.03 10*3/MM3 (ref 1.7–7)
NEUTROPHILS NFR BLD AUTO: 63.4 % (ref 42.7–76)
PLATELET # BLD AUTO: 175 10*3/MM3 (ref 140–450)
PMV BLD AUTO: 8.8 FL (ref 6–12)
POTASSIUM SERPL-SCNC: 4.5 MMOL/L (ref 3.5–5.2)
PROT SERPL-MCNC: 6.2 G/DL (ref 6–8.5)
RBC # BLD AUTO: 4.5 10*6/MM3 (ref 4.14–5.8)
SODIUM SERPL-SCNC: 138 MMOL/L (ref 136–145)
T4 FREE SERPL-MCNC: 1.2 NG/DL (ref 0.92–1.68)
TSH SERPL DL<=0.05 MIU/L-ACNC: 1.87 UIU/ML (ref 0.27–4.2)
WBC NRBC COR # BLD AUTO: 7.94 10*3/MM3 (ref 3.4–10.8)

## 2024-05-20 PROCEDURE — 25810000003 SODIUM CHLORIDE 0.9 % SOLUTION 250 ML FLEX CONT: Performed by: INTERNAL MEDICINE

## 2024-05-20 PROCEDURE — 25010000002 PALONOSETRON PER 25 MCG: Performed by: INTERNAL MEDICINE

## 2024-05-20 PROCEDURE — 25010000002 NIVOLUMAB 40 MG/4ML SOLUTION 4 ML VIAL: Performed by: INTERNAL MEDICINE

## 2024-05-20 PROCEDURE — 84443 ASSAY THYROID STIM HORMONE: CPT | Performed by: INTERNAL MEDICINE

## 2024-05-20 PROCEDURE — 25010000002 CARBOPLATIN PER 50 MG: Performed by: INTERNAL MEDICINE

## 2024-05-20 PROCEDURE — 99214 OFFICE O/P EST MOD 30 MIN: CPT | Performed by: INTERNAL MEDICINE

## 2024-05-20 PROCEDURE — 25810000003 SODIUM CHLORIDE 0.9 % SOLUTION 500 ML FLEX CONT: Performed by: INTERNAL MEDICINE

## 2024-05-20 PROCEDURE — 96367 TX/PROPH/DG ADDL SEQ IV INF: CPT

## 2024-05-20 PROCEDURE — 3079F DIAST BP 80-89 MM HG: CPT | Performed by: INTERNAL MEDICINE

## 2024-05-20 PROCEDURE — 96417 CHEMO IV INFUS EACH ADDL SEQ: CPT

## 2024-05-20 PROCEDURE — 96375 TX/PRO/DX INJ NEW DRUG ADDON: CPT

## 2024-05-20 PROCEDURE — 25010000002 DIPHENHYDRAMINE PER 50 MG: Performed by: INTERNAL MEDICINE

## 2024-05-20 PROCEDURE — 25010000002 NIVOLUMAB 240 MG/24ML SOLUTION 24 ML VIAL: Performed by: INTERNAL MEDICINE

## 2024-05-20 PROCEDURE — 80053 COMPREHEN METABOLIC PANEL: CPT | Performed by: INTERNAL MEDICINE

## 2024-05-20 PROCEDURE — 25010000002 PACLITAXEL PER 1 MG: Performed by: INTERNAL MEDICINE

## 2024-05-20 PROCEDURE — 25010000002 FOSAPREPITANT PER 1 MG: Performed by: INTERNAL MEDICINE

## 2024-05-20 PROCEDURE — 85025 COMPLETE CBC W/AUTO DIFF WBC: CPT | Performed by: INTERNAL MEDICINE

## 2024-05-20 PROCEDURE — 84439 ASSAY OF FREE THYROXINE: CPT | Performed by: INTERNAL MEDICINE

## 2024-05-20 PROCEDURE — 1126F AMNT PAIN NOTED NONE PRSNT: CPT | Performed by: INTERNAL MEDICINE

## 2024-05-20 PROCEDURE — 96413 CHEMO IV INFUSION 1 HR: CPT

## 2024-05-20 PROCEDURE — 25010000002 DEXAMETHASONE SODIUM PHOSPHATE 100 MG/10ML SOLUTION: Performed by: INTERNAL MEDICINE

## 2024-05-20 PROCEDURE — 96415 CHEMO IV INFUSION ADDL HR: CPT

## 2024-05-20 PROCEDURE — 25810000003 SODIUM CHLORIDE 0.9 % SOLUTION: Performed by: INTERNAL MEDICINE

## 2024-05-20 PROCEDURE — 3074F SYST BP LT 130 MM HG: CPT | Performed by: INTERNAL MEDICINE

## 2024-05-20 RX ORDER — FAMOTIDINE 10 MG/ML
20 INJECTION, SOLUTION INTRAVENOUS AS NEEDED
OUTPATIENT
Start: 2024-06-10

## 2024-05-20 RX ORDER — CEPHALEXIN 500 MG/1
1 CAPSULE ORAL EVERY 6 HOURS
COMMUNITY
Start: 2024-05-08

## 2024-05-20 RX ORDER — FAMOTIDINE 10 MG/ML
20 INJECTION, SOLUTION INTRAVENOUS ONCE
Status: COMPLETED | OUTPATIENT
Start: 2024-05-20 | End: 2024-05-20

## 2024-05-20 RX ORDER — PALONOSETRON 0.05 MG/ML
0.25 INJECTION, SOLUTION INTRAVENOUS ONCE
Status: COMPLETED | OUTPATIENT
Start: 2024-05-20 | End: 2024-05-20

## 2024-05-20 RX ORDER — FAMOTIDINE 10 MG/ML
20 INJECTION, SOLUTION INTRAVENOUS ONCE
OUTPATIENT
Start: 2024-06-10

## 2024-05-20 RX ORDER — SODIUM CHLORIDE 9 MG/ML
20 INJECTION, SOLUTION INTRAVENOUS ONCE
Status: COMPLETED | OUTPATIENT
Start: 2024-05-20 | End: 2024-05-20

## 2024-05-20 RX ORDER — PALONOSETRON 0.05 MG/ML
0.25 INJECTION, SOLUTION INTRAVENOUS ONCE
OUTPATIENT
Start: 2024-06-10

## 2024-05-20 RX ORDER — DIPHENHYDRAMINE HYDROCHLORIDE 50 MG/ML
50 INJECTION INTRAMUSCULAR; INTRAVENOUS AS NEEDED
OUTPATIENT
Start: 2024-06-10

## 2024-05-20 RX ORDER — SODIUM CHLORIDE 9 MG/ML
20 INJECTION, SOLUTION INTRAVENOUS ONCE
OUTPATIENT
Start: 2024-06-10

## 2024-05-20 RX ADMIN — FAMOTIDINE 20 MG: 10 INJECTION, SOLUTION INTRAVENOUS at 10:43

## 2024-05-20 RX ADMIN — SODIUM CHLORIDE 20 ML/HR: 9 INJECTION, SOLUTION INTRAVENOUS at 10:00

## 2024-05-20 RX ADMIN — SODIUM CHLORIDE 365 MG: 9 INJECTION, SOLUTION INTRAVENOUS at 11:55

## 2024-05-20 RX ADMIN — CARBOPLATIN 600 MG: 10 INJECTION, SOLUTION INTRAVENOUS at 15:26

## 2024-05-20 RX ADMIN — DEXAMETHASONE SODIUM PHOSPHATE 20 MG: 100 INJECTION INTRAMUSCULAR; INTRAVENOUS at 11:10

## 2024-05-20 RX ADMIN — PALONOSETRON HYDROCHLORIDE 0.25 MG: 0.25 INJECTION, SOLUTION INTRAVENOUS at 10:45

## 2024-05-20 RX ADMIN — DIPHENHYDRAMINE HYDROCHLORIDE 50 MG: 50 INJECTION INTRAMUSCULAR; INTRAVENOUS at 10:49

## 2024-05-20 RX ADMIN — SODIUM CHLORIDE 360 MG: 9 INJECTION, SOLUTION INTRAVENOUS at 10:01

## 2024-05-20 RX ADMIN — FOSAPREPITANT 150 MG: 150 INJECTION, POWDER, LYOPHILIZED, FOR SOLUTION INTRAVENOUS at 11:08

## 2024-05-20 NOTE — PROGRESS NOTES
Follow Up Office Visit      Date: 2024     Patient Name: Juancarlos Espinal Jr.  MRN: [id number]  : 1955  Referring Physician: Macarena Mccartney     Chief Complaint: Follow-up for left lung NSCLC-adenocarcinoma     History of Present Illness: Juancarlos Espinal Jr. is a pleasant 68 y.o. male with a past medical history of tobacco abuse, hypertension, hyperlipidemia, anxiety who presents today for evaluation of left lung NSCLC. The patient underwent a low-dose screening CT scan in 2023 which was notable for a 1.2 cm left apical nodule.  He then underwent a PET/CT in 2024 which showed a 1.4 cm lesion in the left upper lobe as well as some concerning left hilar adenopathy.  He was seen by Dr. Mccartney who performed a bronchoscopy with biopsy consistent with a moderately differentiated adenocarcinoma.  Unfortunately the lymph nodes were too difficult to sample due to their location.  MRI brain was without evidence of malignancy.  Today he is doing well.  Denies any chest pain or shortness of breath.  Denies any cough, fevers, chills, weight loss     Interval History:  Presents clinic for follow-up.  Status post cycle 1 of neoadjuvant carboplatin/Taxol/nivolumab.  Tolerated treatment well.  Did have a rash on his lower abdomen and into his legs.  Improved with hydrocortisone cream.  Also noted some slight left forearm discomfort where his chemo was run with cycle 1.  Otherwise doing well today.  Does note some mild alopecia but otherwise no other major concerns or complaints    Oncology History:    Oncology/Hematology History   NSCLC of left lung   2024 Initial Diagnosis    NSCLC of left lung     2024 -  Chemotherapy    OP LUNG  Nivolumab 360mg /  PACLitaxel / CARBOplatin AUC=5          Subjective      Review of Systems:   Constitutional: Negative for fevers, chills, or weight loss  Eyes: Negative for blurred vision or discharge         Ear/Nose/Throat: Negative for difficulty swallowing, sore  throat, LAD                                                       Respiratory: Negative for cough, SOA, wheezing                                                                                        Cardiovascular: Negative for chest pain or palpitations                                                                  Gastrointestinal: Negative for nausea, vomiting or diarrhea                                                                     Genitourinary: Negative for dysuria or hematuria                                                                                           Musculoskeletal: Negative for any joint pains or muscle aches                                                                        Neurologic: Negative for any weakness, headaches, dizziness                                                                         Hematologic: Negative for any easy bleeding or bruising                                                                                   Psychiatric: Negative for anxiety or depression                          Past Medical History/Past Surgical History/ Family History/ Social History: Reviewed by me and unchanged from my previous documentation done on April 2024.     Medications:     Current Outpatient Medications:     aspirin 81 MG EC tablet, Take 1 tablet by mouth Daily., Disp: 30 tablet, Rfl: 0    atorvastatin (LIPITOR) 80 MG tablet, Take 1 tablet by mouth Daily., Disp: 90 tablet, Rfl: 0    cephalexin (KEFLEX) 500 MG capsule, Take 1 capsule by mouth Every 6 (Six) Hours., Disp: , Rfl:     cetirizine (zyrTEC) 10 MG tablet, Take 1 tablet by mouth Daily., Disp: , Rfl:     clonazePAM (KlonoPIN) 1 MG tablet, Take 1 tablet by mouth 2 (Two) Times a Day As Needed for Seizures., Disp: , Rfl:     CloNIDine (CATAPRES) 0.1 MG tablet, Take 1 tablet by mouth 3 (Three) Times a Day As Needed for High Blood Pressure (If your blood pressure is over 180)., Disp: 30 tablet, Rfl: 0    fluticasone  "(FLONASE) 50 MCG/ACT nasal spray, 2 sprays by Each Nare route Daily., Disp: , Rfl:     gabapentin (NEURONTIN) 300 MG capsule, Take 1 capsule by mouth Every Night., Disp: 30 capsule, Rfl: 2    HYDROcodone-acetaminophen (NORCO)  MG per tablet, Take 1 tablet by mouth 3 (Three) Times a Day As Needed for Pain., Disp: , Rfl: 0    lisinopril-hydrochlorothiazide (PRINZIDE,ZESTORETIC) 20-25 MG per tablet, Take 1 tablet by mouth Daily., Disp: , Rfl: 0    meloxicam (MOBIC) 15 MG tablet, Take 1 tablet by mouth Daily., Disp: , Rfl:     montelukast (SINGULAIR) 10 MG tablet, Take 1 tablet by mouth Daily., Disp: , Rfl: 0    OLANZapine (zyPREXA) 5 MG tablet, Take 1 tablet by mouth Every Night. Take nightly x 4 starting night of chemotherapy., Disp: 4 tablet, Rfl: 2    ondansetron (ZOFRAN) 8 MG tablet, Take 1 tablet by mouth 3 (Three) Times a Day As Needed for Nausea or Vomiting., Disp: 30 tablet, Rfl: 2    tiZANidine (ZANAFLEX) 4 MG tablet, Take 1 tablet by mouth Every 8 (Eight) Hours As Needed., Disp: , Rfl:     Allergies:   No Known Allergies    Objective     Physical Exam:  Vital Signs:   Vitals:    05/20/24 0842   BP: 122/80   Pulse: 76   Temp: 97.5 °F (36.4 °C)   SpO2: 98%   Weight: 92.1 kg (203 lb)   Height: 177.8 cm (70\")   PainSc: 0-No pain     Pain Score    05/20/24 0842   PainSc: 0-No pain     ECOG Performance Status: 0 - Asymptomatic    Constitutional: NAD, ECOG 0  Eyes: PERRLA, scleral anicteric  ENT: No LAD, no thyromegaly  Respiratory: CTAB, no wheezing, rales, rhonchi  Cardiovascular: RRR, no murmurs, pulses 2+ bilaterally  Abdomen: soft, NT/ND, no HSM  Musculoskeletal: strength 5/5 bilaterally, no c/c/e  Neurologic: A&O x 3, CN II-XII intact grossly    Results Review:   Hospital Outpatient Visit on 05/20/2024   Component Date Value Ref Range Status    Glucose 05/20/2024 103 (H)  65 - 99 mg/dL Final    BUN 05/20/2024 18  8 - 23 mg/dL Final    Creatinine 05/20/2024 0.98  0.76 - 1.27 mg/dL Final    Sodium " 05/20/2024 138  136 - 145 mmol/L Final    Potassium 05/20/2024 4.5  3.5 - 5.2 mmol/L Final    Slight hemolysis detected by analyzer. Result may be falsely elevated.    Chloride 05/20/2024 100  98 - 107 mmol/L Final    CO2 05/20/2024 25.0  22.0 - 29.0 mmol/L Final    Calcium 05/20/2024 8.7  8.6 - 10.5 mg/dL Final    Total Protein 05/20/2024 6.2  6.0 - 8.5 g/dL Final    Albumin 05/20/2024 3.9  3.5 - 5.2 g/dL Final    ALT (SGPT) 05/20/2024 27  1 - 41 U/L Final    AST (SGOT) 05/20/2024 24  1 - 40 U/L Final    Alkaline Phosphatase 05/20/2024 69  39 - 117 U/L Final    Total Bilirubin 05/20/2024 0.4  0.0 - 1.2 mg/dL Final    Globulin 05/20/2024 2.3  gm/dL Final    Calculated Result    A/G Ratio 05/20/2024 1.7  g/dL Final    BUN/Creatinine Ratio 05/20/2024 18.4  7.0 - 25.0 Final    Anion Gap 05/20/2024 13.0  5.0 - 15.0 mmol/L Final    eGFR 05/20/2024 84.0  >60.0 mL/min/1.73 Final    TSH 05/20/2024 1.870  0.270 - 4.200 uIU/mL Final    Free T4 05/20/2024 1.20  0.92 - 1.68 ng/dL Final    WBC 05/20/2024 7.94  3.40 - 10.80 10*3/mm3 Final    RBC 05/20/2024 4.50  4.14 - 5.80 10*6/mm3 Final    Hemoglobin 05/20/2024 14.2  13.0 - 17.7 g/dL Final    Hematocrit 05/20/2024 41.6  37.5 - 51.0 % Final    MCV 05/20/2024 92.4  79.0 - 97.0 fL Final    MCH 05/20/2024 31.6  26.6 - 33.0 pg Final    MCHC 05/20/2024 34.1  31.5 - 35.7 g/dL Final    RDW 05/20/2024 13.0  12.3 - 15.4 % Final    RDW-SD 05/20/2024 44.3  37.0 - 54.0 fl Final    MPV 05/20/2024 8.8  6.0 - 12.0 fL Final    Platelets 05/20/2024 175  140 - 450 10*3/mm3 Final    Neutrophil % 05/20/2024 63.4  42.7 - 76.0 % Final    Lymphocyte % 05/20/2024 20.0  19.6 - 45.3 % Final    Monocyte % 05/20/2024 12.1 (H)  5.0 - 12.0 % Final    Eosinophil % 05/20/2024 2.9  0.3 - 6.2 % Final    Basophil % 05/20/2024 0.5  0.0 - 1.5 % Final    Immature Grans % 05/20/2024 1.1 (H)  0.0 - 0.5 % Final    Neutrophils, Absolute 05/20/2024 5.03  1.70 - 7.00 10*3/mm3 Final    Lymphocytes, Absolute 05/20/2024 1.59   0.70 - 3.10 10*3/mm3 Final    Monocytes, Absolute 05/20/2024 0.96 (H)  0.10 - 0.90 10*3/mm3 Final    Eosinophils, Absolute 05/20/2024 0.23  0.00 - 0.40 10*3/mm3 Final    Basophils, Absolute 05/20/2024 0.04  0.00 - 0.20 10*3/mm3 Final    Immature Grans, Absolute 05/20/2024 0.09 (H)  0.00 - 0.05 10*3/mm3 Final       No results found.    Assessment / Plan      Assessment/Plan:   1. NSCLC of left lung (Primary)  -Initially noted on screening CT scan in October 2023  -PET/CT in February 2024 notable for 1.4 cm left upper lobe lesion with some concerning left hilar adenopathy  -MRI brain in April 2024 without evidence of metastatic disease  -Biopsy March 2024 consistent with a moderately differentiated adenocarcinoma.  PD-L1 less than 1%  -PFTs adequate for surgical intervention in February 2024  -Clinical stage IIB (T1b,N1,M0)  -Scheduled to begin cycle 2 of carboplatin/Taxol/nivolumab.  Clinically appropriate for treatment.  Labs reviewed from 5/20/2024 and appropriate for treatment    2.  Immunotherapy related dermatitis  -Improved with as needed hydrocortisone cream    3.  Left forearm superficial thrombophlebitis  -Advised finishing his antibiotics and to use warm compresses  -Okay to continue baby aspirin  -If continues to worsen will consider anticoagulation for 1 month         Follow Up:   Follow-up in 3 weeks     Akash Schmitz MD  Hematology and Oncology     Please note that portions of this note may have been completed with a voice recognition program. Efforts were made to edit the dictations, but occasionally words are mistranscribed.

## 2024-06-11 ENCOUNTER — HOSPITAL ENCOUNTER (OUTPATIENT)
Dept: ONCOLOGY | Facility: HOSPITAL | Age: 69
Discharge: HOME | End: 2024-06-11
Admitting: INTERNAL MEDICINE
Payer: MEDICARE

## 2024-06-11 ENCOUNTER — OFFICE VISIT (OUTPATIENT)
Dept: ONCOLOGY | Facility: CLINIC | Age: 69
End: 2024-06-11
Payer: MEDICARE

## 2024-06-11 ENCOUNTER — APPOINTMENT (OUTPATIENT)
Dept: ONCOLOGY | Facility: HOSPITAL | Age: 69
End: 2024-06-11
Payer: MEDICARE

## 2024-06-11 VITALS
WEIGHT: 199 LBS | BODY MASS INDEX: 28.49 KG/M2 | TEMPERATURE: 98.5 F | DIASTOLIC BLOOD PRESSURE: 74 MMHG | RESPIRATION RATE: 18 BRPM | HEART RATE: 85 BPM | HEIGHT: 70 IN | SYSTOLIC BLOOD PRESSURE: 123 MMHG | OXYGEN SATURATION: 95 %

## 2024-06-11 VITALS — HEART RATE: 58 BPM | DIASTOLIC BLOOD PRESSURE: 67 MMHG | SYSTOLIC BLOOD PRESSURE: 119 MMHG

## 2024-06-11 DIAGNOSIS — C34.12 MALIGNANT NEOPLASM OF UPPER LOBE, LEFT BRONCHUS OR LUNG: ICD-10-CM

## 2024-06-11 DIAGNOSIS — C34.92 NSCLC OF LEFT LUNG: Primary | ICD-10-CM

## 2024-06-11 LAB
ALBUMIN SERPL-MCNC: 4.1 G/DL (ref 3.5–5.2)
ALBUMIN/GLOB SERPL: 1.8 G/DL
ALP SERPL-CCNC: 73 U/L (ref 39–117)
ALT SERPL W P-5'-P-CCNC: 32 U/L (ref 1–41)
ANION GAP SERPL CALCULATED.3IONS-SCNC: 10 MMOL/L (ref 5–15)
AST SERPL-CCNC: 26 U/L (ref 1–40)
BASOPHILS # BLD AUTO: 0.02 10*3/MM3 (ref 0–0.2)
BASOPHILS NFR BLD AUTO: 0.3 % (ref 0–1.5)
BILIRUB SERPL-MCNC: 0.5 MG/DL (ref 0–1.2)
BUN SERPL-MCNC: 13 MG/DL (ref 8–23)
BUN/CREAT SERPL: 14 (ref 7–25)
CALCIUM SPEC-SCNC: 9.2 MG/DL (ref 8.6–10.5)
CHLORIDE SERPL-SCNC: 105 MMOL/L (ref 98–107)
CO2 SERPL-SCNC: 28 MMOL/L (ref 22–29)
CREAT SERPL-MCNC: 0.93 MG/DL (ref 0.76–1.27)
DEPRECATED RDW RBC AUTO: 49.8 FL (ref 37–54)
EGFRCR SERPLBLD CKD-EPI 2021: 89.4 ML/MIN/1.73
EOSINOPHIL # BLD AUTO: 0.1 10*3/MM3 (ref 0–0.4)
EOSINOPHIL NFR BLD AUTO: 1.5 % (ref 0.3–6.2)
ERYTHROCYTE [DISTWIDTH] IN BLOOD BY AUTOMATED COUNT: 14.5 % (ref 12.3–15.4)
GLOBULIN UR ELPH-MCNC: 2.3 GM/DL
GLUCOSE SERPL-MCNC: 103 MG/DL (ref 65–99)
HCT VFR BLD AUTO: 40.6 % (ref 37.5–51)
HGB BLD-MCNC: 13.8 G/DL (ref 13–17.7)
IMM GRANULOCYTES # BLD AUTO: 0.04 10*3/MM3 (ref 0–0.05)
IMM GRANULOCYTES NFR BLD AUTO: 0.6 % (ref 0–0.5)
LYMPHOCYTES # BLD AUTO: 1.47 10*3/MM3 (ref 0.7–3.1)
LYMPHOCYTES NFR BLD AUTO: 22.3 % (ref 19.6–45.3)
MCH RBC QN AUTO: 32.5 PG (ref 26.6–33)
MCHC RBC AUTO-ENTMCNC: 34 G/DL (ref 31.5–35.7)
MCV RBC AUTO: 95.8 FL (ref 79–97)
MONOCYTES # BLD AUTO: 0.85 10*3/MM3 (ref 0.1–0.9)
MONOCYTES NFR BLD AUTO: 12.9 % (ref 5–12)
NEUTROPHILS NFR BLD AUTO: 4.11 10*3/MM3 (ref 1.7–7)
NEUTROPHILS NFR BLD AUTO: 62.4 % (ref 42.7–76)
PLATELET # BLD AUTO: 185 10*3/MM3 (ref 140–450)
PMV BLD AUTO: 9 FL (ref 6–12)
POTASSIUM SERPL-SCNC: 5.1 MMOL/L (ref 3.5–5.2)
PROT SERPL-MCNC: 6.4 G/DL (ref 6–8.5)
RBC # BLD AUTO: 4.24 10*6/MM3 (ref 4.14–5.8)
SODIUM SERPL-SCNC: 143 MMOL/L (ref 136–145)
T4 FREE SERPL-MCNC: 1.08 NG/DL (ref 0.92–1.68)
TSH SERPL DL<=0.05 MIU/L-ACNC: 1.62 UIU/ML (ref 0.27–4.2)
WBC NRBC COR # BLD AUTO: 6.59 10*3/MM3 (ref 3.4–10.8)

## 2024-06-11 PROCEDURE — 84443 ASSAY THYROID STIM HORMONE: CPT | Performed by: INTERNAL MEDICINE

## 2024-06-11 PROCEDURE — 25010000002 FOSAPREPITANT PER 1 MG: Performed by: INTERNAL MEDICINE

## 2024-06-11 PROCEDURE — 25810000003 SODIUM CHLORIDE 0.9 % SOLUTION 250 ML FLEX CONT: Performed by: INTERNAL MEDICINE

## 2024-06-11 PROCEDURE — 80053 COMPREHEN METABOLIC PANEL: CPT | Performed by: INTERNAL MEDICINE

## 2024-06-11 PROCEDURE — 3078F DIAST BP <80 MM HG: CPT | Performed by: INTERNAL MEDICINE

## 2024-06-11 PROCEDURE — 96413 CHEMO IV INFUSION 1 HR: CPT

## 2024-06-11 PROCEDURE — 96415 CHEMO IV INFUSION ADDL HR: CPT

## 2024-06-11 PROCEDURE — 84439 ASSAY OF FREE THYROXINE: CPT | Performed by: INTERNAL MEDICINE

## 2024-06-11 PROCEDURE — 25010000002 CARBOPLATIN PER 50 MG: Performed by: INTERNAL MEDICINE

## 2024-06-11 PROCEDURE — 99214 OFFICE O/P EST MOD 30 MIN: CPT | Performed by: INTERNAL MEDICINE

## 2024-06-11 PROCEDURE — 25010000002 PALONOSETRON PER 25 MCG: Performed by: INTERNAL MEDICINE

## 2024-06-11 PROCEDURE — 96375 TX/PRO/DX INJ NEW DRUG ADDON: CPT

## 2024-06-11 PROCEDURE — 25810000003 SODIUM CHLORIDE 0.9 % SOLUTION 500 ML FLEX CONT: Performed by: INTERNAL MEDICINE

## 2024-06-11 PROCEDURE — 25010000002 DIPHENHYDRAMINE PER 50 MG: Performed by: INTERNAL MEDICINE

## 2024-06-11 PROCEDURE — 96417 CHEMO IV INFUS EACH ADDL SEQ: CPT

## 2024-06-11 PROCEDURE — 25010000002 DEXAMETHASONE SODIUM PHOSPHATE 100 MG/10ML SOLUTION: Performed by: INTERNAL MEDICINE

## 2024-06-11 PROCEDURE — 1126F AMNT PAIN NOTED NONE PRSNT: CPT | Performed by: INTERNAL MEDICINE

## 2024-06-11 PROCEDURE — 96367 TX/PROPH/DG ADDL SEQ IV INF: CPT

## 2024-06-11 PROCEDURE — 85025 COMPLETE CBC W/AUTO DIFF WBC: CPT | Performed by: INTERNAL MEDICINE

## 2024-06-11 PROCEDURE — 25010000002 PACLITAXEL PER 1 MG: Performed by: INTERNAL MEDICINE

## 2024-06-11 PROCEDURE — 3074F SYST BP LT 130 MM HG: CPT | Performed by: INTERNAL MEDICINE

## 2024-06-11 PROCEDURE — 25010000002 NIVOLUMAB 40 MG/4ML SOLUTION 4 ML VIAL: Performed by: INTERNAL MEDICINE

## 2024-06-11 PROCEDURE — 25010000002 NIVOLUMAB 240 MG/24ML SOLUTION 24 ML VIAL: Performed by: INTERNAL MEDICINE

## 2024-06-11 PROCEDURE — 25810000003 SODIUM CHLORIDE 0.9 % SOLUTION: Performed by: INTERNAL MEDICINE

## 2024-06-11 RX ORDER — PALONOSETRON 0.05 MG/ML
0.25 INJECTION, SOLUTION INTRAVENOUS ONCE
Status: COMPLETED | OUTPATIENT
Start: 2024-06-11 | End: 2024-06-11

## 2024-06-11 RX ORDER — FAMOTIDINE 10 MG/ML
20 INJECTION, SOLUTION INTRAVENOUS ONCE
Status: COMPLETED | OUTPATIENT
Start: 2024-06-11 | End: 2024-06-11

## 2024-06-11 RX ORDER — SODIUM CHLORIDE 9 MG/ML
20 INJECTION, SOLUTION INTRAVENOUS ONCE
Status: COMPLETED | OUTPATIENT
Start: 2024-06-11 | End: 2024-06-11

## 2024-06-11 RX ADMIN — SODIUM CHLORIDE 360 MG: 9 INJECTION, SOLUTION INTRAVENOUS at 11:03

## 2024-06-11 RX ADMIN — DIPHENHYDRAMINE HYDROCHLORIDE 50 MG: 50 INJECTION INTRAMUSCULAR; INTRAVENOUS at 11:52

## 2024-06-11 RX ADMIN — CARBOPLATIN 610 MG: 600 INJECTION, SOLUTION INTRAVENOUS at 16:23

## 2024-06-11 RX ADMIN — SODIUM CHLORIDE 365 MG: 9 INJECTION, SOLUTION INTRAVENOUS at 13:05

## 2024-06-11 RX ADMIN — DEXAMETHASONE SODIUM PHOSPHATE 20 MG: 10 INJECTION, SOLUTION INTRAMUSCULAR; INTRAVENOUS at 11:51

## 2024-06-11 RX ADMIN — FOSAPREPITANT DIMEGLUMINE 150 MG: 150 INJECTION, POWDER, LYOPHILIZED, FOR SOLUTION INTRAVENOUS at 12:08

## 2024-06-11 RX ADMIN — PALONOSETRON HYDROCHLORIDE 0.25 MG: 0.25 INJECTION INTRAVENOUS at 11:43

## 2024-06-11 RX ADMIN — SODIUM CHLORIDE 20 ML/HR: 9 INJECTION, SOLUTION INTRAVENOUS at 10:47

## 2024-06-11 RX ADMIN — FAMOTIDINE 20 MG: 10 INJECTION, SOLUTION INTRAVENOUS at 11:47

## 2024-06-11 NOTE — PROGRESS NOTES
Follow Up Office Visit      Date: 2024     Patient Name: Juancarlos Espinal Jr.  MRN: [id number]  : 1955  Referring Physician: Macarena Mccartney     Chief Complaint: Follow-up for left lung NSCLC-adenocarcinoma     History of Present Illness: Juancarlos Espinal Jr. is a pleasant 68 y.o. male with a past medical history of tobacco abuse, hypertension, hyperlipidemia, anxiety who presents today for evaluation of left lung NSCLC. The patient underwent a low-dose screening CT scan in 2023 which was notable for a 1.2 cm left apical nodule.  He then underwent a PET/CT in 2024 which showed a 1.4 cm lesion in the left upper lobe as well as some concerning left hilar adenopathy.  He was seen by Dr. Mccartney who performed a bronchoscopy with biopsy consistent with a moderately differentiated adenocarcinoma.  Unfortunately the lymph nodes were too difficult to sample due to their location.  MRI brain was without evidence of malignancy.  Today he is doing well.  Denies any chest pain or shortness of breath.  Denies any cough, fevers, chills, weight loss     Interval History:  Presents clinic for follow-up.  Status post cycle 2 of neoadjuvant carboplatin/Taxol/nivolumab.  Tolerating treatment well.  Denies any rash with this cycle.  Denies any abdominal pain or discomfort.  Denies any shortness of breath or chest pain.  Continues to remain very active at home    Oncology History:    Oncology/Hematology History   NSCLC of left lung   2024 Initial Diagnosis    NSCLC of left lung     2024 -  Chemotherapy    OP LUNG  Nivolumab 360mg /  PACLitaxel / CARBOplatin AUC=5          Subjective      Review of Systems:   Constitutional: Negative for fevers, chills, or weight loss  Eyes: Negative for blurred vision or discharge         Ear/Nose/Throat: Negative for difficulty swallowing, sore throat, LAD                                                       Respiratory: Negative for cough, SOA, wheezing                                                                                         Cardiovascular: Negative for chest pain or palpitations                                                                  Gastrointestinal: Negative for nausea, vomiting or diarrhea                                                                     Genitourinary: Negative for dysuria or hematuria                                                                                           Musculoskeletal: Negative for any joint pains or muscle aches                                                                        Neurologic: Negative for any weakness, headaches, dizziness                                                                         Hematologic: Negative for any easy bleeding or bruising                                                                                   Psychiatric: Negative for anxiety or depression                          Past Medical History/Past Surgical History/ Family History/ Social History: Reviewed by me and unchanged from my previous documentation done on May 2024.     Medications:     Current Outpatient Medications:     aspirin 81 MG EC tablet, Take 1 tablet by mouth Daily., Disp: 30 tablet, Rfl: 0    atorvastatin (LIPITOR) 80 MG tablet, Take 1 tablet by mouth Daily., Disp: 90 tablet, Rfl: 0    cephalexin (KEFLEX) 500 MG capsule, Take 1 capsule by mouth Every 6 (Six) Hours., Disp: , Rfl:     cetirizine (zyrTEC) 10 MG tablet, Take 1 tablet by mouth Daily., Disp: , Rfl:     clonazePAM (KlonoPIN) 1 MG tablet, Take 1 tablet by mouth 2 (Two) Times a Day As Needed for Seizures., Disp: , Rfl:     CloNIDine (CATAPRES) 0.1 MG tablet, Take 1 tablet by mouth 3 (Three) Times a Day As Needed for High Blood Pressure (If your blood pressure is over 180)., Disp: 30 tablet, Rfl: 0    fluticasone (FLONASE) 50 MCG/ACT nasal spray, 2 sprays by Each Nare route Daily., Disp: , Rfl:     gabapentin (NEURONTIN) 300 MG capsule,  "Take 1 capsule by mouth Every Night., Disp: 30 capsule, Rfl: 2    HYDROcodone-acetaminophen (NORCO)  MG per tablet, Take 1 tablet by mouth 3 (Three) Times a Day As Needed for Pain., Disp: , Rfl: 0    lisinopril-hydrochlorothiazide (PRINZIDE,ZESTORETIC) 20-25 MG per tablet, Take 1 tablet by mouth Daily., Disp: , Rfl: 0    meloxicam (MOBIC) 15 MG tablet, Take 1 tablet by mouth Daily., Disp: , Rfl:     montelukast (SINGULAIR) 10 MG tablet, Take 1 tablet by mouth Daily., Disp: , Rfl: 0    OLANZapine (zyPREXA) 5 MG tablet, Take 1 tablet by mouth Every Night. Take nightly x 4 starting night of chemotherapy., Disp: 4 tablet, Rfl: 2    ondansetron (ZOFRAN) 8 MG tablet, Take 1 tablet by mouth 3 (Three) Times a Day As Needed for Nausea or Vomiting., Disp: 30 tablet, Rfl: 2    tiZANidine (ZANAFLEX) 4 MG tablet, Take 1 tablet by mouth Every 8 (Eight) Hours As Needed., Disp: , Rfl:     Allergies:   No Known Allergies    Objective     Physical Exam:  Vital Signs:   Vitals:    06/11/24 0850   BP: 123/74   Pulse: 85   Resp: 18   Temp: 98.5 °F (36.9 °C)   SpO2: 95%   Weight: 90.3 kg (199 lb)   Height: 177.8 cm (70\")   PainSc: 0-No pain     Pain Score    06/11/24 0850   PainSc: 0-No pain     ECOG Performance Status: 0 - Asymptomatic    Constitutional: NAD, ECOG 0  Eyes: PERRLA, scleral anicteric  ENT: No LAD, no thyromegaly  Respiratory: CTAB, no wheezing, rales, rhonchi  Cardiovascular: RRR, no murmurs, pulses 2+ bilaterally  Abdomen: soft, NT/ND, no HSM  Musculoskeletal: strength 5/5 bilaterally, no c/c/e  Neurologic: A&O x 3, CN II-XII intact grossly    Results Review:   Hospital Outpatient Visit on 06/11/2024   Component Date Value Ref Range Status    WBC 06/11/2024 6.59  3.40 - 10.80 10*3/mm3 Final    RBC 06/11/2024 4.24  4.14 - 5.80 10*6/mm3 Final    Hemoglobin 06/11/2024 13.8  13.0 - 17.7 g/dL Final    Hematocrit 06/11/2024 40.6  37.5 - 51.0 % Final    MCV 06/11/2024 95.8  79.0 - 97.0 fL Final    MCH 06/11/2024 32.5  " 26.6 - 33.0 pg Final    MCHC 06/11/2024 34.0  31.5 - 35.7 g/dL Final    RDW 06/11/2024 14.5  12.3 - 15.4 % Final    RDW-SD 06/11/2024 49.8  37.0 - 54.0 fl Final    MPV 06/11/2024 9.0  6.0 - 12.0 fL Final    Platelets 06/11/2024 185  140 - 450 10*3/mm3 Final    Neutrophil % 06/11/2024 62.4  42.7 - 76.0 % Final    Lymphocyte % 06/11/2024 22.3  19.6 - 45.3 % Final    Monocyte % 06/11/2024 12.9 (H)  5.0 - 12.0 % Final    Eosinophil % 06/11/2024 1.5  0.3 - 6.2 % Final    Basophil % 06/11/2024 0.3  0.0 - 1.5 % Final    Immature Grans % 06/11/2024 0.6 (H)  0.0 - 0.5 % Final    Neutrophils, Absolute 06/11/2024 4.11  1.70 - 7.00 10*3/mm3 Final    Lymphocytes, Absolute 06/11/2024 1.47  0.70 - 3.10 10*3/mm3 Final    Monocytes, Absolute 06/11/2024 0.85  0.10 - 0.90 10*3/mm3 Final    Eosinophils, Absolute 06/11/2024 0.10  0.00 - 0.40 10*3/mm3 Final    Basophils, Absolute 06/11/2024 0.02  0.00 - 0.20 10*3/mm3 Final    Immature Grans, Absolute 06/11/2024 0.04  0.00 - 0.05 10*3/mm3 Final       No results found.    Assessment / Plan      Assessment/Plan:   1. NSCLC of left lung (Primary)  -Initially noted on screening CT scan in October 2023  -PET/CT in February 2024 notable for 1.4 cm left upper lobe lesion with some concerning left hilar adenopathy  -MRI brain in April 2024 without evidence of metastatic disease  -Biopsy March 2024 consistent with a moderately differentiated adenocarcinoma.  PD-L1 less than 1%  -PFTs adequate for surgical intervention in February 2024  -Clinical stage IIB (T1b,N1,M0)  -Scheduled to begin cycle 3 of carboplatin/Taxol/nivolumab.  Clinically appropriate for treatment.  Labs reviewed from 6/11/2024 and appropriate for treatment  -Plan for PET/CT in the next couple weeks.  Ordered today     2.  Immunotherapy related dermatitis  -Resolved     3.  Left forearm superficial thrombophlebitis  -Resolved         Follow Up:   Follow-up after PET/CT     Akash Schmitz MD  Hematology and Oncology     Please  note that portions of this note may have been completed with a voice recognition program. Efforts were made to edit the dictations, but occasionally words are mistranscribed.

## 2024-07-12 ENCOUNTER — TELEPHONE (OUTPATIENT)
Dept: CARDIAC SURGERY | Facility: CLINIC | Age: 69
End: 2024-07-12
Payer: MEDICARE

## 2024-07-26 ENCOUNTER — HOSPITAL ENCOUNTER (OUTPATIENT)
Facility: HOSPITAL | Age: 69
Discharge: HOME | End: 2024-07-26
Payer: MEDICARE

## 2024-07-26 ENCOUNTER — OFFICE VISIT (OUTPATIENT)
Dept: ONCOLOGY | Facility: CLINIC | Age: 69
End: 2024-07-26
Payer: MEDICARE

## 2024-07-26 VITALS
OXYGEN SATURATION: 97 % | SYSTOLIC BLOOD PRESSURE: 134 MMHG | HEART RATE: 89 BPM | RESPIRATION RATE: 18 BRPM | BODY MASS INDEX: 28.63 KG/M2 | DIASTOLIC BLOOD PRESSURE: 74 MMHG | WEIGHT: 200 LBS | TEMPERATURE: 97.8 F | HEIGHT: 70 IN

## 2024-07-26 DIAGNOSIS — C34.92 NSCLC OF LEFT LUNG: ICD-10-CM

## 2024-07-26 DIAGNOSIS — C34.12 MALIGNANT NEOPLASM OF UPPER LOBE, LEFT BRONCHUS OR LUNG: ICD-10-CM

## 2024-07-26 DIAGNOSIS — C34.12 MALIGNANT NEOPLASM OF UPPER LOBE, LEFT BRONCHUS OR LUNG: Primary | ICD-10-CM

## 2024-07-26 LAB — GLUCOSE BLDC GLUCOMTR-MCNC: 120 MG/DL (ref 70–130)

## 2024-07-26 PROCEDURE — 78815 PET IMAGE W/CT SKULL-THIGH: CPT

## 2024-07-26 PROCEDURE — 1126F AMNT PAIN NOTED NONE PRSNT: CPT | Performed by: INTERNAL MEDICINE

## 2024-07-26 PROCEDURE — 3078F DIAST BP <80 MM HG: CPT | Performed by: INTERNAL MEDICINE

## 2024-07-26 PROCEDURE — 82948 REAGENT STRIP/BLOOD GLUCOSE: CPT

## 2024-07-26 PROCEDURE — A9552 F18 FDG: HCPCS | Performed by: INTERNAL MEDICINE

## 2024-07-26 PROCEDURE — 0 FLUDEOXYGLUCOSE F18 SOLUTION: Performed by: INTERNAL MEDICINE

## 2024-07-26 PROCEDURE — 99214 OFFICE O/P EST MOD 30 MIN: CPT | Performed by: INTERNAL MEDICINE

## 2024-07-26 PROCEDURE — 3075F SYST BP GE 130 - 139MM HG: CPT | Performed by: INTERNAL MEDICINE

## 2024-07-26 RX ADMIN — FLUDEOXYGLUCOSE F18 1 DOSE: 300 INJECTION INTRAVENOUS at 10:09

## 2024-07-26 NOTE — PROGRESS NOTES
Follow Up Office Visit      Date: 2024     Patient Name: Juancarlos Espinal Jr.  MRN: [id number]  : 1955  Referring Physician: Macarena Mccartney     Chief Complaint: Follow-up for left lung NSCLC-adenocarcinoma     History of Present Illness: Juancarlos Espinal Jr. is a pleasant 68 y.o. male with a past medical history of tobacco abuse, hypertension, hyperlipidemia, anxiety who presents today for evaluation of left lung NSCLC. The patient underwent a low-dose screening CT scan in 2023 which was notable for a 1.2 cm left apical nodule.  He then underwent a PET/CT in 2024 which showed a 1.4 cm lesion in the left upper lobe as well as some concerning left hilar adenopathy.  He was seen by Dr. Mccartney who performed a bronchoscopy with biopsy consistent with a moderately differentiated adenocarcinoma.  Unfortunately the lymph nodes were too difficult to sample due to their location.  MRI brain was without evidence of malignancy.  Today he is doing well.  Denies any chest pain or shortness of breath.  Denies any cough, fevers, chills, weight loss     Interval History:  Presents to clinic for follow-up.  Status post cycle 3 of neoadjuvant carboplatin/Taxol/nivolumab completing in 2024.  Having a lot of life stress as his son was recently arrested last evening.  Denies any shortness of breath or chest pain today    Oncology History:    Oncology/Hematology History   NSCLC of left lung   2024 Initial Diagnosis    NSCLC of left lung     2024 -  Chemotherapy    OP LUNG  Nivolumab 360mg /  PACLitaxel / CARBOplatin AUC=5          Subjective      Review of Systems:   Constitutional: Negative for fevers, chills, or weight loss  Eyes: Negative for blurred vision or discharge         Ear/Nose/Throat: Negative for difficulty swallowing, sore throat, LAD                                                       Respiratory: Negative for cough, SOA, wheezing                                                                                         Cardiovascular: Negative for chest pain or palpitations                                                                  Gastrointestinal: Negative for nausea, vomiting or diarrhea                                                                     Genitourinary: Negative for dysuria or hematuria                                                                                           Musculoskeletal: Negative for any joint pains or muscle aches                                                                        Neurologic: Negative for any weakness, headaches, dizziness                                                                         Hematologic: Negative for any easy bleeding or bruising                                                                                   Psychiatric: Negative for anxiety or depression                          Past Medical History/Past Surgical History/ Family History/ Social History: Reviewed by me and unchanged from my previous documentation done on June 2024.     Medications:     Current Outpatient Medications:     aspirin 81 MG EC tablet, Take 1 tablet by mouth Daily., Disp: 30 tablet, Rfl: 0    atorvastatin (LIPITOR) 80 MG tablet, Take 1 tablet by mouth Daily., Disp: 90 tablet, Rfl: 0    cephalexin (KEFLEX) 500 MG capsule, Take 1 capsule by mouth Every 6 (Six) Hours., Disp: , Rfl:     cetirizine (zyrTEC) 10 MG tablet, Take 1 tablet by mouth Daily., Disp: , Rfl:     clonazePAM (KlonoPIN) 1 MG tablet, Take 1 tablet by mouth 2 (Two) Times a Day As Needed for Seizures., Disp: , Rfl:     CloNIDine (CATAPRES) 0.1 MG tablet, Take 1 tablet by mouth 3 (Three) Times a Day As Needed for High Blood Pressure (If your blood pressure is over 180)., Disp: 30 tablet, Rfl: 0    fluticasone (FLONASE) 50 MCG/ACT nasal spray, 2 sprays by Each Nare route Daily., Disp: , Rfl:     gabapentin (NEURONTIN) 300 MG capsule, Take 1 capsule by mouth Every Night.,  "Disp: 30 capsule, Rfl: 2    HYDROcodone-acetaminophen (NORCO)  MG per tablet, Take 1 tablet by mouth 3 (Three) Times a Day As Needed for Pain., Disp: , Rfl: 0    lisinopril-hydrochlorothiazide (PRINZIDE,ZESTORETIC) 20-25 MG per tablet, Take 1 tablet by mouth Daily., Disp: , Rfl: 0    meloxicam (MOBIC) 15 MG tablet, Take 1 tablet by mouth Daily., Disp: , Rfl:     montelukast (SINGULAIR) 10 MG tablet, Take 1 tablet by mouth Daily., Disp: , Rfl: 0    OLANZapine (zyPREXA) 5 MG tablet, Take 1 tablet by mouth Every Night. Take nightly x 4 starting night of chemotherapy., Disp: 4 tablet, Rfl: 2    ondansetron (ZOFRAN) 8 MG tablet, Take 1 tablet by mouth 3 (Three) Times a Day As Needed for Nausea or Vomiting., Disp: 30 tablet, Rfl: 2    tiZANidine (ZANAFLEX) 4 MG tablet, Take 1 tablet by mouth Every 8 (Eight) Hours As Needed., Disp: , Rfl:   No current facility-administered medications for this visit.    Allergies:   No Known Allergies    Objective     Physical Exam:  Vital Signs:   Vitals:    07/26/24 1309   BP: 134/74   Pulse: 89   Resp: 18   Temp: 97.8 °F (36.6 °C)   TempSrc: Temporal   SpO2: 97%   Weight: 90.7 kg (200 lb)   Height: 177.8 cm (70\")   PainSc: 0-No pain     Pain Score    07/26/24 1309   PainSc: 0-No pain     ECOG Performance Status: 0 - Asymptomatic    Constitutional: NAD, ECOG 0  Eyes: PERRLA, scleral anicteric  ENT: No LAD, no thyromegaly  Respiratory: CTAB, no wheezing, rales, rhonchi  Cardiovascular: RRR, no murmurs, pulses 2+ bilaterally  Abdomen: soft, NT/ND, no HSM  Musculoskeletal: strength 5/5 bilaterally, no c/c/e  Neurologic: A&O x 3, CN II-XII intact grossly    Results Review:   Hospital Outpatient Visit on 07/26/2024   Component Date Value Ref Range Status    Glucose 07/26/2024 120  70 - 130 mg/dL Final    Serial Number: [id number]:  [id number]       NM PET/CT Skull Base to Mid Thigh    Result Date: 7/26/2024  Narrative: NM PET/CT SKULL BASE TO MID THIGH Date of Exam: 7/26/2024 9:50 " AM EDT Indication: NSCLC. Comparison: Chest CT 3/4/2024, PET/CT 2/16/2024 Technique:   12.61 mCi of F-18 FDG was administered intravenously. PET imaging was obtained from skull base to mid-thigh approximately 60 minutes after radiotracer injection. A low dose non contrast CT was obtained for attenuation correction and anatomic  localization. Fused PET-CT and 3D MIP reconstructions were utilized for image interpretation.  Fasting blood glucose level: 120 mg/dl. Reference uptake values: Mediastinum: 3.1 SUVmax Liver: 4.9 SUVmax Normalization method: Body Weight Findings: Head/neck: No suspicious FDG avid masses or adenopathy. Carotid atherosclerotic disease. CHEST: 1.5 x 1.1 cm left upper lobe nodule/nodular opacity is mildly hypermetabolic max SUV 2.5 in keeping with biopsy-proven adenocarcinoma. Post size and hypermetabolic activity appear less conspicuous from prior (previously noted to be more hypermetabolic than mediastinal blood pool and liver parenchyma, now below). No FDG avid thoracic adenopathy or other pulmonary nodules. Previously noted moderately hypermetabolic left hilar lymph node does not show significant metabolic activity.  Aortic and coronary atherosclerotic disease. No infectious appearing consolidation. No pneumothorax. Abdomen/pelvis no suspicious FDG avid mass or adenopathy. Aortic atherosclerotic disease with fusiform ectasia of the infrarenal abdominal aorta measuring up to 2.8 cm. Fusiform dilation right common iliac artery measuring up to 2.2 cm. Vascular findings  similar to prior. Colonic diverticulosis. No evidence of active inflammation or bowel obstruction. MUSCULOSKELETAL: No suspicious FDG avid masses or aggressive bone lesions. Degenerative related changes throughout the spine.     Impression: Impression: 1. Decreasing size and hypermetabolic activity involving the biopsy-proven left upper lobe adenocarcinoma consistent with treatment response. Previously noted hypermetabolic left  hilar lymph node not well appreciated on today's exam. 2. No evidence of disease progression. Electronically Signed: Moy Scott MD  7/26/2024 12:24 PM EDT  Workstation ID: [id number]     Assessment / Plan      Assessment/Plan:   1. NSCLC of left lung (Primary)  -Initially noted on screening CT scan in October 2023  -PET/CT in February 2024 notable for 1.4 cm left upper lobe lesion with some concerning left hilar adenopathy  -MRI brain in April 2024 without evidence of metastatic disease  -Biopsy March 2024 consistent with a moderately differentiated adenocarcinoma.  PD-L1 less than 1%  -PFTs adequate for surgical intervention in February 2024  -Clinical stage IIB (T1b,N1,M0)  -Completed 3 cycles of carboplatin/Taxol/nivolumab in June 2024  -PET/CT in July 2024 reviewed showing essentially a complete response  -Ideally would like to proceed with surgery however patient states that given his son's recent arrest, he is not in the right mental state for surgery and needs to be there for send at this point in time  -Will plan discussed his case at tumor board  -Plan to repeat CT scans in 3 months         Follow Up:   Follow-up in 3 months     Akash Schmitz MD  Hematology and Oncology     Please note that portions of this note may have been completed with a voice recognition program. Efforts were made to edit the dictations, but occasionally words are mistranscribed.

## 2024-07-30 NOTE — PROGRESS NOTES
[facility] Cardiothoracic Surgery Office Follow Up Note     Date of Encounter: 2024     Name: Juancarlos Espinal Jr.  : 1955     Referred By: No ref. provider found  PCP: Francine Spence APRN    Chief Complaint:    Chief Complaint   Patient presents with    Lung Cancer     3 month follow up for lung cancer.       Subjective      History of Present Illness:    Juancarlos Espinal Jr. is a 68 y.o. male followed by Dr. Rubin and Oncology, Dr. Schmitz, for recently diagnosed left upper lobe lung cancer (T1b N1 M0 clinical stage IIb). Left upper lobe apical nodule was detected on low dose CT chest 10/2023.  PMH: Hypertension, hyperlipidemia, COPD, tobacco use, and malignant adenocarcinoma left upper lobe clinical stage IIb [id number].  Mr. Espinal has completed neoadjuvant chemotherapy (carboplatin/Taxol/nivolumab) and follows up after repeat NM PET 24. Per Oncology notes, patient has decided to delay surgical intervention due to family situation.  Dr. Schmitz is planning tumor board review and follow up CT in 3 months.   Mr. Espinal denies hemoptysis, night sweats, fatigue.  He does report occasional ZAPATA.      Review of Systems:  Review of Systems   Constitutional: Negative. Negative for chills, decreased appetite, diaphoresis, fever, malaise/fatigue, night sweats and weight loss.   HENT: Negative.  Negative for congestion, hoarse voice and sore throat.    Cardiovascular:  Positive for dyspnea on exertion. Negative for chest pain, irregular heartbeat, leg swelling, near-syncope, orthopnea, palpitations, paroxysmal nocturnal dyspnea and syncope.   Respiratory:  Positive for cough and shortness of breath. Negative for hemoptysis, sleep disturbances due to breathing, snoring, sputum production and wheezing.    Hematologic/Lymphatic: Negative for adenopathy and bleeding problem. Bruises/bleeds easily.   Skin: Negative.  Negative for color change, dry skin, itching, poor wound healing and rash.    Musculoskeletal: Negative.  Negative for falls and muscle weakness.   Gastrointestinal: Negative.  Negative for abdominal pain, anorexia, constipation, diarrhea, nausea and vomiting.   Genitourinary: Negative.  Negative for dysuria and frequency.   Neurological: Negative.  Negative for difficulty with concentration, dizziness, headaches, numbness, paresthesias, seizures and weakness.   Psychiatric/Behavioral: Negative.  Negative for altered mental status, depression and memory loss. The patient does not have insomnia and is not nervous/anxious.    Allergic/Immunologic: Negative.  Negative for persistent infections.       I have reviewed the following portions of the patient's history: problem list, current medications, allergies, past surgical history, past medical history, past social history, past family history, and ROS and confirm it's accurate.    Allergies:  No Known Allergies    Medications:      Current Outpatient Medications:     aspirin 81 MG EC tablet, Take 1 tablet by mouth Daily., Disp: 30 tablet, Rfl: 0    atorvastatin (LIPITOR) 80 MG tablet, Take 1 tablet by mouth Daily., Disp: 90 tablet, Rfl: 0    cephalexin (KEFLEX) 500 MG capsule, Take 1 capsule by mouth Every 6 (Six) Hours., Disp: , Rfl:     cetirizine (zyrTEC) 10 MG tablet, Take 1 tablet by mouth Daily., Disp: , Rfl:     clonazePAM (KlonoPIN) 1 MG tablet, Take 1 tablet by mouth 2 (Two) Times a Day As Needed for Seizures., Disp: , Rfl:     CloNIDine (CATAPRES) 0.1 MG tablet, Take 1 tablet by mouth 3 (Three) Times a Day As Needed for High Blood Pressure (If your blood pressure is over 180)., Disp: 30 tablet, Rfl: 0    fluticasone (FLONASE) 50 MCG/ACT nasal spray, 2 sprays by Each Nare route Daily., Disp: , Rfl:     gabapentin (NEURONTIN) 300 MG capsule, Take 1 capsule by mouth Every Night., Disp: 30 capsule, Rfl: 2    HYDROcodone-acetaminophen (NORCO)  MG per tablet, Take 1 tablet by mouth 3 (Three) Times a Day As Needed for Pain., Disp: ,  Rfl: 0    lisinopril-hydrochlorothiazide (PRINZIDE,ZESTORETIC) 20-25 MG per tablet, Take 1 tablet by mouth Daily., Disp: , Rfl: 0    meloxicam (MOBIC) 15 MG tablet, Take 1 tablet by mouth Daily., Disp: , Rfl:     montelukast (SINGULAIR) 10 MG tablet, Take 1 tablet by mouth Daily., Disp: , Rfl: 0    OLANZapine (zyPREXA) 5 MG tablet, Take 1 tablet by mouth Every Night. Take nightly x 4 starting night of chemotherapy., Disp: 4 tablet, Rfl: 2    ondansetron (ZOFRAN) 8 MG tablet, Take 1 tablet by mouth 3 (Three) Times a Day As Needed for Nausea or Vomiting., Disp: 30 tablet, Rfl: 2    tiZANidine (ZANAFLEX) 4 MG tablet, Take 1 tablet by mouth Every 8 (Eight) Hours As Needed., Disp: , Rfl:     History:   Past Medical History:   Diagnosis Date    Anxiety     Arthritis     Chronic pain     COPD (chronic obstructive pulmonary disease)     Hyperlipemia     Hypertension     Lung cancer        Past Surgical History:   Procedure Laterality Date    BRONCHOSCOPY WITH ION ROBOTIC ASSIST N/A 03/07/2024    Procedure: BRONCHOSCOPY WITH ION ROBOT;  Surgeon: Gabriella Mccartney DO;  Location: [facility] ENDOSCOPY;  Service: Robotics - Pulmonary;  Laterality: N/A;  ION#6 [id number] / #6  0114 CATH GUIDE 0090    COLONOSCOPY  01/2024    OTHER SURGICAL HISTORY      Fluid removed from testicle    STEROID INJECTION HIP Right     multiple       Social History     Socioeconomic History    Marital status:     Number of children: 1   Tobacco Use    Smoking status: Every Day     Current packs/day: 0.50     Average packs/day: 0.5 packs/day for 50.3 years (25.2 ttl pk-yrs)     Types: Cigarettes     Start date: 4/9/1974     Passive exposure: Current    Smokeless tobacco: Never   Vaping Use    Vaping status: Never Used   Substance and Sexual Activity    Alcohol use: Yes     Comment: occasional    Drug use: No    Sexual activity: Defer        Family History   Problem Relation Age of Onset    Alzheimer's disease Mother     Diabetes Mother     Kidney disease  "Father     Valvular heart disease Sister     No Known Problems Maternal Grandmother     No Known Problems Maternal Grandfather     No Known Problems Paternal Grandmother     No Known Problems Paternal Grandfather        Objective   Physical Exam:  Vitals:    07/31/24 0904   BP: 148/69   BP Location: Right arm   Patient Position: Sitting   Pulse: 74   Temp: 97.5 °F (36.4 °C)   SpO2: 98%   Weight: 91.6 kg (202 lb)   Height: 180.3 cm (71\")  Comment: patient reports      Body mass index is 28.17 kg/m².    Physical Exam  Vitals reviewed.   Constitutional:       General: He is not in acute distress.     Appearance: He is not toxic-appearing.   HENT:      Head: Normocephalic and atraumatic.   Eyes:      General: Lids are normal.      Conjunctiva/sclera: Conjunctivae normal.      Pupils: Pupils are equal, round, and reactive to light.   Neck:      Vascular: No carotid bruit.   Cardiovascular:      Rate and Rhythm: Normal rate and regular rhythm.      Heart sounds: S1 normal and S2 normal. No murmur heard.  Pulmonary:      Effort: Pulmonary effort is normal. No respiratory distress.      Breath sounds: No decreased breath sounds, wheezing, rhonchi or rales.   Musculoskeletal:         General: Normal range of motion.      Cervical back: Normal range of motion and neck supple.      Right lower leg: No edema.      Left lower leg: No edema.   Lymphadenopathy:      Cervical: No cervical adenopathy.      Upper Body:      Right upper body: No supraclavicular adenopathy.      Left upper body: No supraclavicular adenopathy.   Skin:     General: Skin is warm and dry.      Capillary Refill: Capillary refill takes less than 2 seconds.   Neurological:      General: No focal deficit present.      Mental Status: He is alert and oriented to person, place, and time.   Psychiatric:         Attention and Perception: Attention normal.         Mood and Affect: Mood normal.         Speech: Speech normal.         Behavior: Behavior normal. " Behavior is cooperative.         Imaging/Labs:  NM PET/CT Skull Base to Mid Thigh: 7/26/2024 (Personally reviewed and agree w/ Radiology assessment)  Impression:   1. Decreasing size and hypermetabolic activity involving the biopsy-proven left upper lobe adenocarcinoma consistent with treatment response. Previously noted hypermetabolic left hilar lymph node not well appreciated on today's exam.   2. No evidence of disease progression.   Electronically Signed: Moy Scott MD  7/26/2024 12:24 PM     MRI BRAIN W WO CONTRAST: 4/5/2024     IMPRESSION:  No evidence of intracranial metastatic disease. Essentially normal contrast-enhanced MRI of the brain as above.   Electronically Signed: Mejia Gaytan MD 4/6/2024 1:27 PM EDT     Bronchoscopy per Dr. Gabriella Mccartney 3/7/2024  Impression:  -Left upper lobe mass   - The airway examination was normal.   - Electromagnetic navigation bronchoscopy was performed.   - Transbronchial lung biopsies were performed.   - Transbronchial lung biopsies were performed.   - Transbronchial brushings were obtained.   - Bronchoalveolar lavage was performed.    Surgical Pathology Report                         Case: [id number]                                   Authorizing Provider:  Gabriella Mccartney DO       Collected:           03/07/2024 11:36 AM           Ordering Location:     [facility]   Received:            03/07/2024 12:35 PM                                  ENDO SUITES                                                                   Pathologist:           Lilian Valencia DO                                                         Specimens:   1) - Lung, Left Upper Lobe, DEBI TBNA                                                                2) - Lung, Left Upper Lobe, DEBI TBBX                                                      Clinical Information    Lung nodule   Final Diagnosis   1) LUNG, LEFT UPPER LOBE, TBNA:  Malignant; adenocarcinoma of lung primary, see  "comment     2) LUNG, LEFT UPPER LOBE, TBBX:  Malignant; adenocarcinoma of lung primary, see comment   Comment    The following immunohistochemical stains were performed and reviewed with appropriate controls on block 2A:   TTF-1: Positive  Napsin A: Positive  P40: Negative  CD56: Negative  Synaptophysin: Negative  Ki-67: Approximately 60%       Gross Description    1. Lung, Left Upper Lobe.  Received in formalin labeled \"DEBI TBNA\" is a 1 x 1 x 0.1 cm aggregate of red, hemorrhagic soft tissue that is filtered and submitted entirely in block 1A.     2. Lung, Left Upper Lobe.  Received in formalin labeled \"DEBI TB BX\" are multiple red-tan soft tissue fragments aggregating 0.6 x 0.6 x 0.1 cm, filtered and submitted entirely in block 2A. HDM      Microscopic Description    The slides are reviewed and demonstrate histopathologic features supporting the above rendered diagnosis.     [facility]           Specimen Collected: 03/07/24 11:36 EST          NM PET 2/16/2024 @ [facility] (Personally reviewed)  Impression:  1.  The questionable left upper lobe spiculated noncalcified pulmonary nodule exhibits mild FDG uptake with associated moderately hypermetabolic left hilar node.  These findings are suspicious for left upper lobe primary lung malignancy with left higher liver metastasis.  Recommend tissue sampling for further evaluation  2.  No convincing evidence of metabolically active distant metastatic disease  Electronically signed Maddi Gan MD 2/19/24    Low-dose chest CT 10/10/2023@ [facility] (Personally reviewed)  Findings:   There is no axillary adenopathy.  There is no hilar or mediastinal adenopathy.  The heart is proper size.  There is no pericardial pleural effusion.  Limited images of the upper abdomen unremarkable.  Lung windows images demonstrate new 12 x 7 mm nodule left upper lobe with atelectasis at its periphery, acute inflammatory or neoplastic.  There are calcified " granulomas in the left lower lobe.  There is mild scarring  Impression: Lung RADS category 4B.  Recommend PET/CT      Assessment / Plan      Assessment / Plan:  1. NSCLC of left lung (Primary)  - 68 y.o. male followed by Dr. Rubin and Oncology, Dr. Schmitz, for recently diagnosed left upper lobe lung cancer (T1b N1 M0 clinical stage IIb). Left upper lobe apical nodule was detected on low dose CT chest 10/2023.    - PMH: Hypertension, hyperlipidemia, COPD, tobacco use, and malignant adenocarcinoma left upper lobe clinical stage IIb [id number].    - Has completed neoadjuvant chemotherapy (carboplatin/Taxol/nivolumab)   -  NM PET 7/26/24: DEBI nodule mildly hypermetabolic w/ SUV 2.5 (less conspicuous from prior exam).  No FDG avid thoracic adenopathy or other pulmonary nodule.  - Per Oncology notes, patient has decided to delay surgical intervention due to family situation.    - Mr. Espinal confirms that he is unable to proceed w/ surgery at this time  - Dr. Schmitz is planning tumor board review   - Will plan to follow up after CT per Oncology again in 3 months.    - Discussed importance of smoking cessation in the setting of NSCLC  - Advised to promptly report any new constitutional symptoms if they occur in the interim.       Patient Education: Juancarlos Espinal Jr.  reports that he has been smoking cigarettes. He started smoking about 50 years ago. He has a 25.2 pack-year smoking history. He has been exposed to tobacco smoke. He has never used smokeless tobacco. I have educated him on the risk of diseases from using tobacco products such as cancer, COPD, heart disease, and PVD .     I advised him to quit and he is willing to quit. We have discussed the following method/s for tobacco cessation:  Cold Jean.  Together we have set a quit date for  over the next three months .  He will follow up with me in 3 months or sooner to check on his progress.  I spent 3  minutes counseling the patient.        Follow Up:   Return in  about 3 months (around 10/31/2024) for CT chest per Oncology.   Or sooner for any further concerns or worsening sign and symptoms. If unable to reach us in the office please dial 911 or go to the nearest emergency department.      MIKI Gomez  [facility] Cardiothoracic Surgery    Time Spent: I spent 30 minutes caring for Juancarlos on this date of service. This time includes time spent by me in the following activities: preparing for the visit, reviewing tests, obtaining and/or reviewing a separately obtained history, performing a medically appropriate examination and/or evaluation, counseling and educating the patient/family/caregiver, documenting information in the medical record, independently interpreting results and communicating that information with the patient/family/caregiver, and care coordination.

## 2024-07-31 ENCOUNTER — OFFICE VISIT (OUTPATIENT)
Dept: CARDIAC SURGERY | Facility: CLINIC | Age: 69
End: 2024-07-31
Payer: MEDICARE

## 2024-07-31 VITALS
HEART RATE: 74 BPM | TEMPERATURE: 97.5 F | SYSTOLIC BLOOD PRESSURE: 148 MMHG | WEIGHT: 202 LBS | BODY MASS INDEX: 28.28 KG/M2 | DIASTOLIC BLOOD PRESSURE: 69 MMHG | OXYGEN SATURATION: 98 % | HEIGHT: 71 IN

## 2024-07-31 DIAGNOSIS — C34.92 NSCLC OF LEFT LUNG: Primary | ICD-10-CM

## 2024-07-31 PROCEDURE — 1160F RVW MEDS BY RX/DR IN RCRD: CPT | Performed by: NURSE PRACTITIONER

## 2024-07-31 PROCEDURE — 99214 OFFICE O/P EST MOD 30 MIN: CPT | Performed by: NURSE PRACTITIONER

## 2024-07-31 PROCEDURE — 3077F SYST BP >= 140 MM HG: CPT | Performed by: NURSE PRACTITIONER

## 2024-07-31 PROCEDURE — 3078F DIAST BP <80 MM HG: CPT | Performed by: NURSE PRACTITIONER

## 2024-07-31 PROCEDURE — 1159F MED LIST DOCD IN RCRD: CPT | Performed by: NURSE PRACTITIONER

## 2024-10-25 ENCOUNTER — OFFICE VISIT (OUTPATIENT)
Dept: ONCOLOGY | Facility: CLINIC | Age: 69
End: 2024-10-25
Payer: MEDICARE

## 2024-10-25 ENCOUNTER — HOSPITAL ENCOUNTER (OUTPATIENT)
Facility: HOSPITAL | Age: 69
Discharge: HOME | End: 2024-10-25
Payer: MEDICARE

## 2024-10-25 VITALS
SYSTOLIC BLOOD PRESSURE: 138 MMHG | BODY MASS INDEX: 29.06 KG/M2 | HEIGHT: 70 IN | OXYGEN SATURATION: 97 % | DIASTOLIC BLOOD PRESSURE: 70 MMHG | WEIGHT: 203 LBS | HEART RATE: 71 BPM | TEMPERATURE: 97.5 F

## 2024-10-25 DIAGNOSIS — C34.12 MALIGNANT NEOPLASM OF UPPER LOBE, LEFT BRONCHUS OR LUNG: ICD-10-CM

## 2024-10-25 DIAGNOSIS — C34.92 NSCLC OF LEFT LUNG: Primary | ICD-10-CM

## 2024-10-25 PROCEDURE — 74177 CT ABD & PELVIS W/CONTRAST: CPT

## 2024-10-25 PROCEDURE — 3075F SYST BP GE 130 - 139MM HG: CPT | Performed by: INTERNAL MEDICINE

## 2024-10-25 PROCEDURE — 99214 OFFICE O/P EST MOD 30 MIN: CPT | Performed by: INTERNAL MEDICINE

## 2024-10-25 PROCEDURE — 3078F DIAST BP <80 MM HG: CPT | Performed by: INTERNAL MEDICINE

## 2024-10-25 PROCEDURE — 71260 CT THORAX DX C+: CPT

## 2024-10-25 PROCEDURE — 1126F AMNT PAIN NOTED NONE PRSNT: CPT | Performed by: INTERNAL MEDICINE

## 2024-10-25 PROCEDURE — 25510000001 IOPAMIDOL 61 % SOLUTION: Performed by: INTERNAL MEDICINE

## 2024-10-25 RX ORDER — PSEUDOEPHEDRINE HCL 30 MG
30 TABLET ORAL EVERY 6 HOURS PRN
COMMUNITY
Start: 2024-08-22

## 2024-10-25 RX ORDER — IOPAMIDOL 612 MG/ML
100 INJECTION, SOLUTION INTRAVASCULAR
Status: COMPLETED | OUTPATIENT
Start: 2024-10-25 | End: 2024-10-25

## 2024-10-25 RX ADMIN — IOPAMIDOL 100 ML: 612 INJECTION, SOLUTION INTRAVENOUS at 11:24

## 2024-10-25 NOTE — PROGRESS NOTES
Follow Up Office Visit      Date: 10/25/2024     Patient Name: Juancarlos Espinal Jr.  MRN: [id number]  : 1955  Referring Physician: Macarena Mccartney     Chief Complaint: Follow-up for left lung NSCLC-adenocarcinoma     History of Present Illness: Juancarlos Espinal Jr. is a pleasant 68 y.o. male with a past medical history of tobacco abuse, hypertension, hyperlipidemia, anxiety who presents today for evaluation of left lung NSCLC. The patient underwent a low-dose screening CT scan in 2023 which was notable for a 1.2 cm left apical nodule.  He then underwent a PET/CT in 2024 which showed a 1.4 cm lesion in the left upper lobe as well as some concerning left hilar adenopathy.  He was seen by Dr. Mccartney who performed a bronchoscopy with biopsy consistent with a moderately differentiated adenocarcinoma.  Unfortunately the lymph nodes were too difficult to sample due to their location.  MRI brain was without evidence of malignancy.  Today he is doing well.  Denies any chest pain or shortness of breath.  Denies any cough, fevers, chills, weight loss     Interval History:  Presents to clinic for follow-up.  Status post cycle 3 of neoadjuvant carboplatin/Taxol/nivolumab completing in 2024.  States that he has stopped smoking and vaping and only using nicotine pouches.  Still having a lot of life stress related to his son as he is still currently incarcerated    Oncology History:    Oncology/Hematology History   NSCLC of left lung   2024 Initial Diagnosis    NSCLC of left lung     2024 -  Chemotherapy    OP LUNG  Nivolumab 360mg /  PACLitaxel / CARBOplatin AUC=5          Subjective      Review of Systems:   Constitutional: Negative for fevers, chills, or weight loss  Eyes: Negative for blurred vision or discharge         Ear/Nose/Throat: Negative for difficulty swallowing, sore throat, LAD                                                       Respiratory: Negative for cough, SOA, wheezing                                                                                         Cardiovascular: Negative for chest pain or palpitations                                                                  Gastrointestinal: Negative for nausea, vomiting or diarrhea                                                                     Genitourinary: Negative for dysuria or hematuria                                                                                           Musculoskeletal: Negative for any joint pains or muscle aches                                                                        Neurologic: Negative for any weakness, headaches, dizziness                                                                         Hematologic: Negative for any easy bleeding or bruising                                                                                   Psychiatric: Negative for anxiety or depression                          Past Medical History/Past Surgical History/ Family History/ Social History: Reviewed by me and unchanged from my previous documentation done on July 2024.     Medications:     Current Outpatient Medications:     aspirin 81 MG EC tablet, Take 1 tablet by mouth Daily., Disp: 30 tablet, Rfl: 0    atorvastatin (LIPITOR) 80 MG tablet, Take 1 tablet by mouth Daily., Disp: 90 tablet, Rfl: 0    cephalexin (KEFLEX) 500 MG capsule, Take 1 capsule by mouth Every 6 (Six) Hours., Disp: , Rfl:     cetirizine (zyrTEC) 10 MG tablet, Take 1 tablet by mouth Daily., Disp: , Rfl:     clonazePAM (KlonoPIN) 1 MG tablet, Take 1 tablet by mouth 2 (Two) Times a Day As Needed for Seizures., Disp: , Rfl:     CloNIDine (CATAPRES) 0.1 MG tablet, Take 1 tablet by mouth 3 (Three) Times a Day As Needed for High Blood Pressure (If your blood pressure is over 180)., Disp: 30 tablet, Rfl: 0    fluticasone (FLONASE) 50 MCG/ACT nasal spray, 2 sprays by Each Nare route Daily., Disp: , Rfl:     gabapentin (NEURONTIN) 300 MG  "capsule, Take 1 capsule by mouth Every Night., Disp: 30 capsule, Rfl: 2    HYDROcodone-acetaminophen (NORCO)  MG per tablet, Take 1 tablet by mouth 3 (Three) Times a Day As Needed for Pain., Disp: , Rfl: 0    lisinopril-hydrochlorothiazide (PRINZIDE,ZESTORETIC) 20-25 MG per tablet, Take 1 tablet by mouth Daily., Disp: , Rfl: 0    meloxicam (MOBIC) 15 MG tablet, Take 1 tablet by mouth Daily., Disp: , Rfl:     montelukast (SINGULAIR) 10 MG tablet, Take 1 tablet by mouth Daily., Disp: , Rfl: 0    OLANZapine (zyPREXA) 5 MG tablet, Take 1 tablet by mouth Every Night. Take nightly x 4 starting night of chemotherapy., Disp: 4 tablet, Rfl: 2    ondansetron (ZOFRAN) 8 MG tablet, Take 1 tablet by mouth 3 (Three) Times a Day As Needed for Nausea or Vomiting., Disp: 30 tablet, Rfl: 2    pseudoephedrine (SUDAFED) 30 MG tablet, Take 1 tablet by mouth Every 6 (Six) Hours As Needed., Disp: , Rfl:     tiZANidine (ZANAFLEX) 4 MG tablet, Take 1 tablet by mouth Every 8 (Eight) Hours As Needed., Disp: , Rfl:   No current facility-administered medications for this visit.    Allergies:   No Known Allergies    Objective     Physical Exam:  Vital Signs:   Vitals:    10/25/24 1303   BP: 138/70   Pulse: 71   Temp: 97.5 °F (36.4 °C)   TempSrc: Infrared   SpO2: 97%   Weight: 92.1 kg (203 lb)   Height: 177.8 cm (70\")   PainSc: 0-No pain     Pain Score    10/25/24 1303   PainSc: 0-No pain     ECOG Performance Status: 0 - Asymptomatic    Constitutional: NAD, ECOG 0  Eyes: PERRLA, scleral anicteric  ENT: No LAD, no thyromegaly  Respiratory: CTAB, no wheezing, rales, rhonchi  Cardiovascular: RRR, no murmurs, pulses 2+ bilaterally  Abdomen: soft, NT/ND, no HSM  Musculoskeletal: strength 5/5 bilaterally, no c/c/e  Neurologic: A&O x 3, CN II-XII intact grossly    Results Review:   No visits with results within 2 Week(s) from this visit.   Latest known visit with results is:   Hospital Outpatient Visit on 07/26/2024   Component Date Value Ref " Range Status    Glucose 07/26/2024 120  70 - 130 mg/dL Final    Serial Number: [id number]:  [id number]       CT Abdomen Pelvis With Contrast    Result Date: 10/25/2024  Narrative: CT CHEST W CONTRAST DIAGNOSTIC, CT ABDOMEN PELVIS W CONTRAST Date of Exam: 10/25/2024 11:18 AM EDT Indication: NSCLC. Comparison: Whole-body PET scan 7/26/2024 Technique: Axial CT images were obtained of the chest, abdomen and pelvis after the uneventful intravenous administration of 100 mL Isovue 300.  Reconstructed coronal and sagittal images were also obtained. Automated exposure control and iterative construction methods were used. Findings: Previous PET scan report from 7/26/2024 noted decreasing size and decreasing activity in left upper lobe adenocarcinoma and no visible remaining hypermetabolic activity in a left hilar node. No new disease. CT SCAN OF THE CHEST WITH IV CONTRAST: Thoracic aorta and pulmonary arteries appear within normal limits. No pericardial or pleural effusion, mediastinal, hilar, or axillary adenopathy is seen. There is stable, almost linear left apical lung scarring. No  new pulmonary parenchymal disease is seen. Bony structures appear to be intact.     Impression: No evidence of active chest disease CT SCAN OF THE ABDOMEN PELVIS WITH IV CONTRAST: There is diffuse fatty liver change. No liver lesions are identified. No significant abnormalities are appreciated of the gallbladder, spleen, pancreas, adrenal glands, or kidneys. There are very small renal cysts not unusual for age. No upper abdominal adenopathy ascites or acute inflammatory change is appreciated. Large and small bowel loops are normal in caliber. Regarding the lower abdomen/pelvis, there is mild ectasia of the infrarenal abdominal aorta to 2.7 cm. The terminal ileum common cecum and appendix appear normal. There is extensive descending colon diverticulosis without evidence of diverticulitis. Bladder is mildly distended but normal in appearance.  Prostate and seminal vesicles are not enlarged. No intrapelvic mass, adenopathy or acute inflammatory change is identified. Delayed venous phase images show no evidence of obstructive uropathy. Bony structures appear to be intact. IMPRESSION: No evidence of malignancy/metastasis in the abdomen or pelvis or other evidence of acute disease. Electronically Signed: Reginaldo Solorzano MD  10/25/2024 1:01 PM EDT  Workstation ID: [id number]    CT Chest With Contrast Diagnostic    Result Date: 10/25/2024  Narrative: CT CHEST W CONTRAST DIAGNOSTIC, CT ABDOMEN PELVIS W CONTRAST Date of Exam: 10/25/2024 11:18 AM EDT Indication: NSCLC. Comparison: Whole-body PET scan 7/26/2024 Technique: Axial CT images were obtained of the chest, abdomen and pelvis after the uneventful intravenous administration of 100 mL Isovue 300.  Reconstructed coronal and sagittal images were also obtained. Automated exposure control and iterative construction methods were used. Findings: Previous PET scan report from 7/26/2024 noted decreasing size and decreasing activity in left upper lobe adenocarcinoma and no visible remaining hypermetabolic activity in a left hilar node. No new disease. CT SCAN OF THE CHEST WITH IV CONTRAST: Thoracic aorta and pulmonary arteries appear within normal limits. No pericardial or pleural effusion, mediastinal, hilar, or axillary adenopathy is seen. There is stable, almost linear left apical lung scarring. No  new pulmonary parenchymal disease is seen. Bony structures appear to be intact.     Impression: No evidence of active chest disease CT SCAN OF THE ABDOMEN PELVIS WITH IV CONTRAST: There is diffuse fatty liver change. No liver lesions are identified. No significant abnormalities are appreciated of the gallbladder, spleen, pancreas, adrenal glands, or kidneys. There are very small renal cysts not unusual for age. No upper abdominal adenopathy ascites or acute inflammatory change is appreciated. Large and small bowel loops are  normal in caliber. Regarding the lower abdomen/pelvis, there is mild ectasia of the infrarenal abdominal aorta to 2.7 cm. The terminal ileum common cecum and appendix appear normal. There is extensive descending colon diverticulosis without evidence of diverticulitis. Bladder is mildly distended but normal in appearance. Prostate and seminal vesicles are not enlarged. No intrapelvic mass, adenopathy or acute inflammatory change is identified. Delayed venous phase images show no evidence of obstructive uropathy. Bony structures appear to be intact. IMPRESSION: No evidence of malignancy/metastasis in the abdomen or pelvis or other evidence of acute disease. Electronically Signed: Reginaldo Solorzano MD  10/25/2024 1:01 PM EDT  Workstation ID: [id number]     Assessment / Plan      Assessment/Plan:   1. NSCLC of left lung (Primary)  -Initially noted on screening CT scan in October 2023  -PET/CT in February 2024 notable for 1.4 cm left upper lobe lesion with some concerning left hilar adenopathy  -MRI brain in April 2024 without evidence of metastatic disease  -Biopsy March 2024 consistent with a moderately differentiated adenocarcinoma.  PD-L1 less than 1%  -PFTs adequate for surgical intervention in February 2024  -Clinical stage IIB (T1b,N1,M0)  -Completed 3 cycles of carboplatin/Taxol/nivolumab in June 2024  -PET/CT in July 2024 reviewed showing essentially a complete response  -CT C/A/P in October 2024 reviewed and without evidence of recurrent or metastatic disease  -Ideally would like to proceed with surgery however patient states that given his son's arrest, he is not in the right mental state for surgery and needs to be there for son at this point in time  -Plan for repeat CT scans in 3 months.  Ordered today         Follow Up:   Follow-up in 3 months    Akash Schmitz MD  Hematology and Oncology     Please note that portions of this note may have been completed with a voice recognition program. Efforts were made to  edit the dictations, but occasionally words are mistranscribed.

## 2025-01-28 ENCOUNTER — OFFICE VISIT (OUTPATIENT)
Dept: ONCOLOGY | Facility: CLINIC | Age: 70
End: 2025-01-28
Payer: MEDICARE

## 2025-01-28 ENCOUNTER — HOSPITAL ENCOUNTER (OUTPATIENT)
Dept: CT IMAGING | Facility: HOSPITAL | Age: 70
Discharge: HOME | End: 2025-01-28
Admitting: INTERNAL MEDICINE
Payer: MEDICARE

## 2025-01-28 VITALS
TEMPERATURE: 97.3 F | OXYGEN SATURATION: 97 % | WEIGHT: 205.6 LBS | HEART RATE: 90 BPM | HEIGHT: 70 IN | RESPIRATION RATE: 16 BRPM | BODY MASS INDEX: 29.43 KG/M2 | SYSTOLIC BLOOD PRESSURE: 136 MMHG | DIASTOLIC BLOOD PRESSURE: 77 MMHG

## 2025-01-28 DIAGNOSIS — C34.92 NSCLC OF LEFT LUNG: Primary | ICD-10-CM

## 2025-01-28 DIAGNOSIS — C34.92 NSCLC OF LEFT LUNG: ICD-10-CM

## 2025-01-28 PROCEDURE — 74177 CT ABD & PELVIS W/CONTRAST: CPT

## 2025-01-28 PROCEDURE — 25510000001 IOPAMIDOL 61 % SOLUTION: Performed by: INTERNAL MEDICINE

## 2025-01-28 PROCEDURE — 71260 CT THORAX DX C+: CPT

## 2025-01-28 RX ORDER — IOPAMIDOL 612 MG/ML
100 INJECTION, SOLUTION INTRAVASCULAR
Status: COMPLETED | OUTPATIENT
Start: 2025-01-28 | End: 2025-01-28

## 2025-01-28 RX ADMIN — IOPAMIDOL 85 ML: 612 INJECTION, SOLUTION INTRAVENOUS at 10:40

## 2025-01-28 NOTE — PROGRESS NOTES
Follow Up Office Visit      Date: 2025     Patient Name: Juancarlos Espinal Jr.  MRN: [id number]  : 1955  Referring Physician: Macarena Mccartney     Chief Complaint: Follow-up for left lung NSCLC-adenocarcinoma     History of Present Illness: Juancarlos Espinal Jr. is a pleasant 68 y.o. male with a past medical history of tobacco abuse, hypertension, hyperlipidemia, anxiety who presents today for evaluation of left lung NSCLC. The patient underwent a low-dose screening CT scan in 2023 which was notable for a 1.2 cm left apical nodule.  He then underwent a PET/CT in 2024 which showed a 1.4 cm lesion in the left upper lobe as well as some concerning left hilar adenopathy.  He was seen by Dr. Mccartney who performed a bronchoscopy with biopsy consistent with a moderately differentiated adenocarcinoma.  Unfortunately the lymph nodes were too difficult to sample due to their location.  MRI brain was without evidence of malignancy.  Today he is doing well.  Denies any chest pain or shortness of breath.  Denies any cough, fevers, chills, weight loss     Interval History:  Presents to clinic for follow-up.  Status post cycle 3 of neoadjuvant carboplatin/Taxol/nivolumab completing in 2024.  No major issues today.  Notes that he has not smoked or vape in about 5-6 months.  Using nicotine pouches at this time.  Denies any new shortness of breath, chest pain    Oncology History:    Oncology/Hematology History   NSCLC of left lung   2024 Initial Diagnosis    NSCLC of left lung     2024 -  Chemotherapy    OP LUNG  Nivolumab 360mg /  PACLitaxel / CARBOplatin AUC=5          Subjective      Review of Systems:   Constitutional: Negative for fevers, chills, or weight loss  Eyes: Negative for blurred vision or discharge         Ear/Nose/Throat: Negative for difficulty swallowing, sore throat, LAD                                                       Respiratory: Negative for cough, SOA, wheezing                                                                                         Cardiovascular: Negative for chest pain or palpitations                                                                  Gastrointestinal: Negative for nausea, vomiting or diarrhea                                                                     Genitourinary: Negative for dysuria or hematuria                                                                                           Musculoskeletal: Negative for any joint pains or muscle aches                                                                        Neurologic: Negative for any weakness, headaches, dizziness                                                                         Hematologic: Negative for any easy bleeding or bruising                                                                                   Psychiatric: Negative for anxiety or depression                          Past Medical History/Past Surgical History/ Family History/ Social History: Reviewed by me and unchanged from my previous documentation done on October 2024.     Medications:     Current Outpatient Medications:     aspirin 81 MG EC tablet, Take 1 tablet by mouth Daily., Disp: 30 tablet, Rfl: 0    atorvastatin (LIPITOR) 80 MG tablet, Take 1 tablet by mouth Daily., Disp: 90 tablet, Rfl: 0    cephalexin (KEFLEX) 500 MG capsule, Take 1 capsule by mouth Every 6 (Six) Hours., Disp: , Rfl:     cetirizine (zyrTEC) 10 MG tablet, Take 1 tablet by mouth Daily., Disp: , Rfl:     clonazePAM (KlonoPIN) 1 MG tablet, Take 1 tablet by mouth 2 (Two) Times a Day As Needed for Seizures., Disp: , Rfl:     CloNIDine (CATAPRES) 0.1 MG tablet, Take 1 tablet by mouth 3 (Three) Times a Day As Needed for High Blood Pressure (If your blood pressure is over 180)., Disp: 30 tablet, Rfl: 0    fluticasone (FLONASE) 50 MCG/ACT nasal spray, 2 sprays by Each Nare route Daily., Disp: , Rfl:     gabapentin (NEURONTIN) 300 MG  "capsule, Take 1 capsule by mouth Every Night., Disp: 30 capsule, Rfl: 2    HYDROcodone-acetaminophen (NORCO)  MG per tablet, Take 1 tablet by mouth 3 (Three) Times a Day As Needed for Pain., Disp: , Rfl: 0    lisinopril-hydrochlorothiazide (PRINZIDE,ZESTORETIC) 20-25 MG per tablet, Take 1 tablet by mouth Daily., Disp: , Rfl: 0    meloxicam (MOBIC) 15 MG tablet, Take 1 tablet by mouth Daily., Disp: , Rfl:     montelukast (SINGULAIR) 10 MG tablet, Take 1 tablet by mouth Daily., Disp: , Rfl: 0    OLANZapine (zyPREXA) 5 MG tablet, Take 1 tablet by mouth Every Night. Take nightly x 4 starting night of chemotherapy., Disp: 4 tablet, Rfl: 2    ondansetron (ZOFRAN) 8 MG tablet, Take 1 tablet by mouth 3 (Three) Times a Day As Needed for Nausea or Vomiting., Disp: 30 tablet, Rfl: 2    pseudoephedrine (SUDAFED) 30 MG tablet, Take 1 tablet by mouth Every 6 (Six) Hours As Needed., Disp: , Rfl:     tiZANidine (ZANAFLEX) 4 MG tablet, Take 1 tablet by mouth Every 8 (Eight) Hours As Needed., Disp: , Rfl:   No current facility-administered medications for this visit.    Allergies:   No Known Allergies    Objective     Physical Exam:  Vital Signs:   Vitals:    01/28/25 1246   BP: 136/77   Pulse: 90   Resp: 16   Temp: 97.3 °F (36.3 °C)   TempSrc: Temporal   SpO2: 97%   Weight: 93.3 kg (205 lb 9.6 oz)   Height: 177.8 cm (70\")   PainSc: 0-No pain     Pain Score    01/28/25 1246   PainSc: 0-No pain     ECOG Performance Status: 0 - Asymptomatic    Constitutional: NAD, ECOG 0  Eyes: PERRLA, scleral anicteric  ENT: No LAD, no thyromegaly  Respiratory: CTAB, no wheezing, rales, rhonchi  Cardiovascular: RRR, no murmurs, pulses 2+ bilaterally  Abdomen: soft, NT/ND, no HSM  Musculoskeletal: strength 5/5 bilaterally, no c/c/e  Neurologic: A&O x 3, CN II-XII intact grossly    Results Review:   No visits with results within 2 Week(s) from this visit.   Latest known visit with results is:   Hospital Outpatient Visit on 07/26/2024   Component " Date Value Ref Range Status    Glucose 07/26/2024 120  70 - 130 mg/dL Final    Serial Number: [id number]:  [id number]       No results found.    Assessment / Plan      Assessment/Plan:   1. NSCLC of left lung (Primary)  -Initially noted on screening CT scan in October 2023  -PET/CT in February 2024 notable for 1.4 cm left upper lobe lesion with some concerning left hilar adenopathy  -MRI brain in April 2024 without evidence of metastatic disease  -Biopsy March 2024 consistent with a moderately differentiated adenocarcinoma.  PD-L1 less than 1%  -PFTs adequate for surgical intervention in February 2024  -Clinical stage IIB (T1b,N1,M0)  -Completed 3 cycles of carboplatin/Taxol/nivolumab in June 2024  -PET/CT in July 2024 reviewed showing essentially a complete response  -CT C/A/P in October 2024 reviewed and without evidence of recurrent or metastatic disease  -CT C/A/P in January 2025 personally reviewed and without evidence of recurrent or metastatic disease.  Official radiology read pending  -Ideally would like to proceed with surgery however patient states that given his son's arrest, he is not in the right mental state for surgery and needs to be there for son at this point in time  -Plan for repeat CT scans in 3 months.  Ordered today         Follow Up:   Follow-up in 3 months     Akash Schmitz MD  Hematology and Oncology     Please note that portions of this note may have been completed with a voice recognition program. Efforts were made to edit the dictations, but occasionally words are mistranscribed.

## 2025-04-29 ENCOUNTER — OFFICE VISIT (OUTPATIENT)
Dept: ONCOLOGY | Facility: CLINIC | Age: 70
End: 2025-04-29
Payer: MEDICARE

## 2025-04-29 ENCOUNTER — HOSPITAL ENCOUNTER (OUTPATIENT)
Dept: CT IMAGING | Facility: HOSPITAL | Age: 70
Discharge: HOME | End: 2025-04-29
Admitting: INTERNAL MEDICINE
Payer: MEDICARE

## 2025-04-29 VITALS
TEMPERATURE: 96.8 F | SYSTOLIC BLOOD PRESSURE: 118 MMHG | DIASTOLIC BLOOD PRESSURE: 75 MMHG | BODY MASS INDEX: 30.02 KG/M2 | HEIGHT: 70 IN | HEART RATE: 85 BPM | RESPIRATION RATE: 18 BRPM | WEIGHT: 209.7 LBS | OXYGEN SATURATION: 97 %

## 2025-04-29 DIAGNOSIS — C34.92 NSCLC OF LEFT LUNG: ICD-10-CM

## 2025-04-29 DIAGNOSIS — C34.92 NSCLC OF LEFT LUNG: Primary | ICD-10-CM

## 2025-04-29 PROCEDURE — 71260 CT THORAX DX C+: CPT

## 2025-04-29 PROCEDURE — 25510000001 IOPAMIDOL 61 % SOLUTION: Performed by: INTERNAL MEDICINE

## 2025-04-29 PROCEDURE — 74177 CT ABD & PELVIS W/CONTRAST: CPT

## 2025-04-29 RX ORDER — IOPAMIDOL 612 MG/ML
100 INJECTION, SOLUTION INTRAVASCULAR
Status: COMPLETED | OUTPATIENT
Start: 2025-04-29 | End: 2025-04-29

## 2025-04-29 RX ADMIN — IOPAMIDOL 85 ML: 612 INJECTION, SOLUTION INTRAVENOUS at 12:32

## 2025-04-29 NOTE — PROGRESS NOTES
Follow Up Office Visit      Date: 2025     Patient Name: Juancarlos Espinal Jr.  MRN: [id number]  : 1955  Referring Physician: Macarena Mccartney     Chief Complaint: Follow-up for left lung NSCLC-adenocarcinoma     History of Present Illness: Juancarlos Espinal Jr. is a pleasant 68 y.o. male with a past medical history of tobacco abuse, hypertension, hyperlipidemia, anxiety who presents today for evaluation of left lung NSCLC. The patient underwent a low-dose screening CT scan in 2023 which was notable for a 1.2 cm left apical nodule.  He then underwent a PET/CT in 2024 which showed a 1.4 cm lesion in the left upper lobe as well as some concerning left hilar adenopathy.  He was seen by Dr. Mccartney who performed a bronchoscopy with biopsy consistent with a moderately differentiated adenocarcinoma.  Unfortunately the lymph nodes were too difficult to sample due to their location.  MRI brain was without evidence of malignancy.  Today he is doing well.  Denies any chest pain or shortness of breath.  Denies any cough, fevers, chills, weight loss     Interval History:  Presents to clinic for follow-up.  Status post cycle 3 of neoadjuvant carboplatin/Taxol/nivolumab completing in 2024.  Continues to do well.  Denies any chest pain or shortness of breath    Oncology History:    Oncology/Hematology History   NSCLC of left lung   2024 Initial Diagnosis    NSCLC of left lung     2024 -  Chemotherapy    OP LUNG  Nivolumab 360mg /  PACLitaxel / CARBOplatin AUC=5          Subjective      Review of Systems:   Constitutional: Negative for fevers, chills, or weight loss  Eyes: Negative for blurred vision or discharge         Ear/Nose/Throat: Negative for difficulty swallowing, sore throat, LAD                                                       Respiratory: Negative for cough, SOA, wheezing                                                                                        Cardiovascular:  Negative for chest pain or palpitations                                                                  Gastrointestinal: Negative for nausea, vomiting or diarrhea                                                                     Genitourinary: Negative for dysuria or hematuria                                                                                           Musculoskeletal: Negative for any joint pains or muscle aches                                                                        Neurologic: Negative for any weakness, headaches, dizziness                                                                         Hematologic: Negative for any easy bleeding or bruising                                                                                   Psychiatric: Negative for anxiety or depression                          Past Medical History/Past Surgical History/ Family History/ Social History: Reviewed by me and unchanged from my previous documentation done on January 2025.     Medications:     Current Outpatient Medications:     aspirin 81 MG EC tablet, Take 1 tablet by mouth Daily., Disp: 30 tablet, Rfl: 0    atorvastatin (LIPITOR) 80 MG tablet, Take 1 tablet by mouth Daily., Disp: 90 tablet, Rfl: 0    cephalexin (KEFLEX) 500 MG capsule, Take 1 capsule by mouth Every 6 (Six) Hours., Disp: , Rfl:     cetirizine (zyrTEC) 10 MG tablet, Take 1 tablet by mouth Daily., Disp: , Rfl:     clonazePAM (KlonoPIN) 1 MG tablet, Take 1 tablet by mouth 2 (Two) Times a Day As Needed for Seizures., Disp: , Rfl:     CloNIDine (CATAPRES) 0.1 MG tablet, Take 1 tablet by mouth 3 (Three) Times a Day As Needed for High Blood Pressure (If your blood pressure is over 180)., Disp: 30 tablet, Rfl: 0    fluticasone (FLONASE) 50 MCG/ACT nasal spray, 2 sprays by Each Nare route Daily., Disp: , Rfl:     gabapentin (NEURONTIN) 300 MG capsule, Take 1 capsule by mouth Every Night., Disp: 30 capsule, Rfl: 2     "HYDROcodone-acetaminophen (NORCO)  MG per tablet, Take 1 tablet by mouth 3 (Three) Times a Day As Needed for Pain., Disp: , Rfl: 0    lisinopril-hydrochlorothiazide (PRINZIDE,ZESTORETIC) 20-25 MG per tablet, Take 1 tablet by mouth Daily., Disp: , Rfl: 0    meloxicam (MOBIC) 15 MG tablet, Take 1 tablet by mouth Daily., Disp: , Rfl:     montelukast (SINGULAIR) 10 MG tablet, Take 1 tablet by mouth Daily., Disp: , Rfl: 0    OLANZapine (zyPREXA) 5 MG tablet, Take 1 tablet by mouth Every Night. Take nightly x 4 starting night of chemotherapy., Disp: 4 tablet, Rfl: 2    ondansetron (ZOFRAN) 8 MG tablet, Take 1 tablet by mouth 3 (Three) Times a Day As Needed for Nausea or Vomiting., Disp: 30 tablet, Rfl: 2    pseudoephedrine (SUDAFED) 30 MG tablet, Take 1 tablet by mouth Every 6 (Six) Hours As Needed., Disp: , Rfl:     tiZANidine (ZANAFLEX) 4 MG tablet, Take 1 tablet by mouth Every 8 (Eight) Hours As Needed., Disp: , Rfl:   No current facility-administered medications for this visit.    Allergies:   No Known Allergies    Objective     Physical Exam:  Vital Signs:   Vitals:    04/29/25 1333   BP: 118/75   Pulse: 85   Resp: 18   Temp: 96.8 °F (36 °C)   TempSrc: Temporal   SpO2: 97%   Weight: 95.1 kg (209 lb 11.2 oz)   Height: 177.8 cm (70\")   PainSc: 0-No pain     Pain Score    04/29/25 1333   PainSc: 0-No pain     ECOG Performance Status: 0 - Asymptomatic    Constitutional: NAD, ECOG 0  Eyes: PERRLA, scleral anicteric  ENT: No LAD, no thyromegaly  Respiratory: CTAB, no wheezing, rales, rhonchi  Cardiovascular: RRR, no murmurs, pulses 2+ bilaterally  Abdomen: soft, NT/ND, no HSM  Musculoskeletal: strength 5/5 bilaterally, no c/c/e  Neurologic: A&O x 3, CN II-XII intact grossly    Results Review:   No visits with results within 2 Week(s) from this visit.   Latest known visit with results is:   Hospital Outpatient Visit on 07/26/2024   Component Date Value Ref Range Status    Glucose 07/26/2024 120  70 - 130 mg/dL Final "    Serial Number: [id number]:  [id number]       No results found.    Assessment / Plan      Assessment/Plan:   1. NSCLC of left lung (Primary)  -Initially noted on screening CT scan in October 2023  -PET/CT in February 2024 notable for 1.4 cm left upper lobe lesion with some concerning left hilar adenopathy  -MRI brain in April 2024 without evidence of metastatic disease  -Biopsy March 2024 consistent with a moderately differentiated adenocarcinoma.  PD-L1 less than 1%  -PFTs adequate for surgical intervention in February 2024  -Clinical stage IIB (T1b,N1,M0)  -Completed 3 cycles of carboplatin/Taxol/nivolumab in June 2024  -PET/CT in July 2024 reviewed showing essentially a complete response  -Patient deferred on surgical intervention due to family issues regarding his son  -CT C/A/P in October 2024 reviewed and without evidence of recurrent or metastatic disease  -CT C/A/P in January 2025 reviewed and without evidence of recurrent or metastatic disease.    -CT C/A/P in April 2025 personally reviewed without evidence of recurrent or metastatic disease.  Official radiology read pending  -Plan for repeat CT scans in 3 months.  Ordered today         Follow Up:   Follow-up in 3 months     Akash Schmitz MD  Hematology and Oncology     Please note that portions of this note may have been completed with a voice recognition program. Efforts were made to edit the dictations, but occasionally words are mistranscribed.

## 2025-05-02 ENCOUNTER — TELEPHONE (OUTPATIENT)
Dept: ONCOLOGY | Facility: CLINIC | Age: 70
End: 2025-05-02
Payer: MEDICARE

## 2025-07-07 ENCOUNTER — TELEPHONE (OUTPATIENT)
Dept: ONCOLOGY | Facility: CLINIC | Age: 70
End: 2025-07-07
Payer: MEDICARE

## 2025-07-07 NOTE — TELEPHONE ENCOUNTER
Caller: Juancarlos Espinal Jr.    Relationship: Self    Best call back number:   Telephone Information:   Mobile [phone]       What is the best time to reach you: ANYTIME     What was the call regarding: PT IS NEEDING TO CX BOTH APPTS ON 7/29 AND R/S. PLEASE CB TO ADVISE.

## 2025-08-05 ENCOUNTER — HOSPITAL ENCOUNTER (OUTPATIENT)
Dept: CT IMAGING | Facility: HOSPITAL | Age: 70
Discharge: HOME | End: 2025-08-05
Admitting: INTERNAL MEDICINE
Payer: MEDICARE

## 2025-08-05 ENCOUNTER — OFFICE VISIT (OUTPATIENT)
Dept: ONCOLOGY | Facility: CLINIC | Age: 70
End: 2025-08-05
Payer: MEDICARE

## 2025-08-05 VITALS
SYSTOLIC BLOOD PRESSURE: 137 MMHG | HEART RATE: 83 BPM | OXYGEN SATURATION: 97 % | HEIGHT: 70 IN | WEIGHT: 211 LBS | RESPIRATION RATE: 18 BRPM | DIASTOLIC BLOOD PRESSURE: 81 MMHG | BODY MASS INDEX: 30.21 KG/M2 | TEMPERATURE: 98 F

## 2025-08-05 DIAGNOSIS — C34.92 NSCLC OF LEFT LUNG: Primary | ICD-10-CM

## 2025-08-05 DIAGNOSIS — C34.12 MALIGNANT NEOPLASM OF UPPER LOBE, LEFT BRONCHUS OR LUNG: ICD-10-CM

## 2025-08-05 DIAGNOSIS — C34.92 NSCLC OF LEFT LUNG: ICD-10-CM

## 2025-08-05 PROCEDURE — 25510000001 IOPAMIDOL 61 % SOLUTION: Performed by: INTERNAL MEDICINE

## 2025-08-05 PROCEDURE — 74177 CT ABD & PELVIS W/CONTRAST: CPT

## 2025-08-05 PROCEDURE — 71260 CT THORAX DX C+: CPT

## 2025-08-05 RX ORDER — IOPAMIDOL 612 MG/ML
100 INJECTION, SOLUTION INTRAVASCULAR
Status: COMPLETED | OUTPATIENT
Start: 2025-08-05 | End: 2025-08-05

## 2025-08-05 RX ADMIN — IOPAMIDOL 85 ML: 612 INJECTION, SOLUTION INTRAVENOUS at 11:31

## 2025-08-22 ENCOUNTER — HOSPITAL ENCOUNTER (OUTPATIENT)
Dept: PET IMAGING | Facility: HOSPITAL | Age: 70
Discharge: HOME | End: 2025-08-22
Payer: MEDICARE

## 2025-08-22 ENCOUNTER — OFFICE VISIT (OUTPATIENT)
Dept: ONCOLOGY | Facility: CLINIC | Age: 70
End: 2025-08-22
Payer: MEDICARE

## 2025-08-22 VITALS
SYSTOLIC BLOOD PRESSURE: 130 MMHG | BODY MASS INDEX: 29.91 KG/M2 | HEART RATE: 78 BPM | RESPIRATION RATE: 16 BRPM | HEIGHT: 70 IN | WEIGHT: 208.9 LBS | DIASTOLIC BLOOD PRESSURE: 70 MMHG | OXYGEN SATURATION: 100 % | TEMPERATURE: 97.5 F

## 2025-08-22 DIAGNOSIS — C34.92 NSCLC OF LEFT LUNG: ICD-10-CM

## 2025-08-22 DIAGNOSIS — C34.12 MALIGNANT NEOPLASM OF UPPER LOBE, LEFT BRONCHUS OR LUNG: ICD-10-CM

## 2025-08-22 DIAGNOSIS — C34.12 MALIGNANT NEOPLASM OF UPPER LOBE, LEFT BRONCHUS OR LUNG: Primary | ICD-10-CM

## 2025-08-22 LAB — GLUCOSE BLDC GLUCOMTR-MCNC: 113 MG/DL (ref 70–130)

## 2025-08-22 PROCEDURE — 82948 REAGENT STRIP/BLOOD GLUCOSE: CPT

## 2025-08-22 PROCEDURE — 34310000005 FLUDEOXYGLUCOSE F18 SOLUTION: Performed by: INTERNAL MEDICINE

## 2025-08-22 PROCEDURE — A9552 F18 FDG: HCPCS | Performed by: INTERNAL MEDICINE

## 2025-08-22 PROCEDURE — 78815 PET IMAGE W/CT SKULL-THIGH: CPT

## 2025-08-22 RX ADMIN — FLUDEOXYGLUCOSE F 18 1 DOSE: 200 INJECTION, SOLUTION INTRAVENOUS at 08:50

## 2025-08-28 ENCOUNTER — OFFICE VISIT (OUTPATIENT)
Dept: RADIATION ONCOLOGY | Facility: HOSPITAL | Age: 70
End: 2025-08-28
Payer: MEDICARE

## 2025-08-28 VITALS
TEMPERATURE: 98.1 F | BODY MASS INDEX: 29.66 KG/M2 | OXYGEN SATURATION: 99 % | WEIGHT: 207.2 LBS | RESPIRATION RATE: 16 BRPM | DIASTOLIC BLOOD PRESSURE: 82 MMHG | SYSTOLIC BLOOD PRESSURE: 150 MMHG | HEART RATE: 78 BPM | HEIGHT: 70 IN

## 2025-08-28 DIAGNOSIS — C34.92 NSCLC OF LEFT LUNG: Primary | ICD-10-CM

## 2025-08-28 PROCEDURE — G0463 HOSPITAL OUTPT CLINIC VISIT: HCPCS

## (undated) DEVICE — BOWL UTIL STRL 32OZ

## (undated) DEVICE — SWIVEL CONNECTOR

## (undated) DEVICE — BIOPSY NEEDLE, 19G: Brand: FLEXISION

## (undated) DEVICE — CATHETER GUIDE

## (undated) DEVICE — TRAP SPECI MUCUS 40CC LF STRL

## (undated) DEVICE — CATHETER: Brand: ION

## (undated) DEVICE — VISION PROBE: Brand: ION

## (undated) DEVICE — FRCP BX RADJAW4 PULM WO NDL STD1.8X2 100

## (undated) DEVICE — DISPOSABLE CYTOLOGY BRUSH: Brand: DISPOSABLE CYTOLOGY BRUSH

## (undated) DEVICE — VLV SXN BRONCH DISP FOR FLEX ENDOSCOPE

## (undated) DEVICE — VISION PROBE ADAPTER AND SUCTION ADAPTER

## (undated) DEVICE — SINGLE USE SUCTION VALVE MAJ-209: Brand: SINGLE USE SUCTION VALVE (STERILE)

## (undated) DEVICE — SOL IRR NACL 0.9PCT 1000ML

## (undated) DEVICE — Device: Brand: ION

## (undated) DEVICE — SENSR O2 OXIMAX FNGR A/ 18IN NONSTR

## (undated) DEVICE — SYR SLPTP 20CC